# Patient Record
Sex: MALE | Race: BLACK OR AFRICAN AMERICAN | NOT HISPANIC OR LATINO | Employment: FULL TIME | URBAN - METROPOLITAN AREA
[De-identification: names, ages, dates, MRNs, and addresses within clinical notes are randomized per-mention and may not be internally consistent; named-entity substitution may affect disease eponyms.]

---

## 2017-04-25 ENCOUNTER — HISTORICAL (OUTPATIENT)
Dept: LAB | Facility: HOSPITAL | Age: 52
End: 2017-04-25

## 2018-06-01 ENCOUNTER — HISTORICAL (OUTPATIENT)
Dept: ADMINISTRATIVE | Facility: HOSPITAL | Age: 53
End: 2018-06-01

## 2018-08-24 ENCOUNTER — HISTORICAL (OUTPATIENT)
Dept: RADIOLOGY | Facility: HOSPITAL | Age: 53
End: 2018-08-24

## 2019-03-09 ENCOUNTER — HISTORICAL (OUTPATIENT)
Dept: ADMINISTRATIVE | Facility: HOSPITAL | Age: 54
End: 2019-03-09

## 2019-03-09 LAB
APPEARANCE, UA: ABNORMAL
BACTERIA #/AREA URNS AUTO: ABNORMAL /[HPF]
BILIRUB SERPL-MCNC: NORMAL MG/DL
BILIRUB UR QL STRIP: NEGATIVE
BLOOD URINE, POC: NORMAL
CLARITY, POC UA: NORMAL
COLOR UR: YELLOW
COLOR, POC UA: NORMAL
GLUCOSE (UA): NORMAL
GLUCOSE UR QL STRIP: NEGATIVE
HGB UR QL STRIP: 0.2 MG/DL
HYALINE CASTS #/AREA URNS LPF: ABNORMAL /[LPF]
KETONES UR QL STRIP: 10 MG/DL
KETONES UR QL STRIP: NORMAL
LEUKOCYTE EST, POC UA: NORMAL
LEUKOCYTE ESTERASE UR QL STRIP: 500 LEU/UL
MUCOUS THREADS URNS QL MICRO: ABNORMAL
NITRITE UR QL STRIP: ABNORMAL
NITRITE, POC UA: NEGATIVE
PH UR STRIP: 6.5 [PH] (ref 4.5–8)
PH, POC UA: 6
PROT UR QL STRIP: 100 MG/DL
PROTEIN, POC: NORMAL
RBC #/AREA URNS AUTO: ABNORMAL /[HPF]
SP GR UR STRIP: 1.02 (ref 1–1.03)
SPECIFIC GRAVITY, POC UA: 1.02
SQUAMOUS #/AREA URNS LPF: ABNORMAL /[LPF]
UROBILINOGEN UR STRIP-ACNC: 6 MG/DL
UROBILINOGEN, POC UA: NORMAL
WBC #/AREA URNS AUTO: >=100 /HPF

## 2020-03-12 ENCOUNTER — HISTORICAL (OUTPATIENT)
Dept: ADMINISTRATIVE | Facility: HOSPITAL | Age: 55
End: 2020-03-12

## 2020-03-15 LAB — FINAL CULTURE: NORMAL

## 2020-06-05 ENCOUNTER — HISTORICAL (OUTPATIENT)
Dept: ADMINISTRATIVE | Facility: HOSPITAL | Age: 55
End: 2020-06-05

## 2020-06-05 LAB
ABS NEUT (OLG): 2.49 X10(3)/MCL (ref 2.1–9.2)
ALBUMIN SERPL-MCNC: 4.2 GM/DL (ref 3.5–5)
ALBUMIN/GLOB SERPL: 1.1 RATIO (ref 1.1–2)
ALP SERPL-CCNC: 109 UNIT/L (ref 40–150)
ALT SERPL-CCNC: 35 UNIT/L (ref 0–55)
AST SERPL-CCNC: 25 UNIT/L (ref 5–34)
BASOPHILS # BLD AUTO: 0 X10(3)/MCL (ref 0–0.2)
BASOPHILS NFR BLD AUTO: 1 %
BILIRUB SERPL-MCNC: 0.5 MG/DL
BILIRUBIN DIRECT+TOT PNL SERPL-MCNC: 0.2 MG/DL (ref 0–0.5)
BILIRUBIN DIRECT+TOT PNL SERPL-MCNC: 0.3 MG/DL (ref 0–0.8)
BUN SERPL-MCNC: 11 MG/DL (ref 8.4–25.7)
CALCIUM SERPL-MCNC: 10.1 MG/DL (ref 8.4–10.2)
CHLORIDE SERPL-SCNC: 104 MMOL/L (ref 98–107)
CHOLEST SERPL-MCNC: 206 MG/DL
CHOLEST/HDLC SERPL: 5 {RATIO} (ref 0–5)
CO2 SERPL-SCNC: 22 MMOL/L (ref 22–29)
CREAT SERPL-MCNC: 0.78 MG/DL (ref 0.73–1.18)
DEPRECATED CALCIDIOL+CALCIFEROL SERPL-MC: 35.6 NG/ML (ref 6.6–49.9)
EOSINOPHIL # BLD AUTO: 0.3 X10(3)/MCL (ref 0–0.9)
EOSINOPHIL NFR BLD AUTO: 4 %
ERYTHROCYTE [DISTWIDTH] IN BLOOD BY AUTOMATED COUNT: 14 % (ref 11.5–17)
EST. AVERAGE GLUCOSE BLD GHB EST-MCNC: 180 MG/DL
GLOBULIN SER-MCNC: 3.7 GM/DL (ref 2.4–3.5)
GLUCOSE SERPL-MCNC: 141 MG/DL (ref 74–100)
HBA1C MFR BLD: 7.9 %
HCT VFR BLD AUTO: 43 % (ref 42–52)
HDLC SERPL-MCNC: 40 MG/DL (ref 35–60)
HGB BLD-MCNC: 13.3 GM/DL (ref 14–18)
LDLC SERPL CALC-MCNC: 138 MG/DL (ref 50–140)
LYMPHOCYTES # BLD AUTO: 3.6 X10(3)/MCL (ref 0.6–4.6)
LYMPHOCYTES NFR BLD AUTO: 52 %
MCH RBC QN AUTO: 27.2 PG (ref 27–31)
MCHC RBC AUTO-ENTMCNC: 30.9 GM/DL (ref 33–36)
MCV RBC AUTO: 87.9 FL (ref 80–94)
MONOCYTES # BLD AUTO: 0.5 X10(3)/MCL (ref 0.1–1.3)
MONOCYTES NFR BLD AUTO: 7 %
NEUTROPHILS # BLD AUTO: 2.49 X10(3)/MCL (ref 2.1–9.2)
NEUTROPHILS NFR BLD AUTO: 36 %
PLATELET # BLD AUTO: 365 X10(3)/MCL (ref 130–400)
PMV BLD AUTO: 10.1 FL (ref 9.4–12.4)
POTASSIUM SERPL-SCNC: 3.9 MMOL/L (ref 3.5–5.1)
PROT SERPL-MCNC: 7.9 GM/DL (ref 6.4–8.3)
PSA SERPL-MCNC: 0.57 NG/ML
RBC # BLD AUTO: 4.89 X10(6)/MCL (ref 4.7–6.1)
SODIUM SERPL-SCNC: 138 MMOL/L (ref 136–145)
TRIGL SERPL-MCNC: 139 MG/DL (ref 34–140)
TSH SERPL-ACNC: 2.87 UIU/ML (ref 0.35–4.94)
VIT B12 SERPL-MCNC: 335 PG/ML (ref 213–816)
VLDLC SERPL CALC-MCNC: 28 MG/DL
WBC # SPEC AUTO: 6.9 X10(3)/MCL (ref 4.5–11.5)

## 2020-07-14 LAB
BILIRUB SERPL-MCNC: NEGATIVE MG/DL
BLOOD URINE, POC: NEGATIVE
CLARITY, POC UA: NORMAL
COLOR, POC UA: NORMAL
GLUCOSE UR QL STRIP: NEGATIVE
KETONES UR QL STRIP: NEGATIVE
LEUKOCYTE EST, POC UA: NEGATIVE
NITRITE, POC UA: NEGATIVE
PH, POC UA: 5
PROTEIN, POC: NEGATIVE
SPECIFIC GRAVITY, POC UA: 1.02
UROBILINOGEN, POC UA: NORMAL

## 2020-12-23 ENCOUNTER — HISTORICAL (OUTPATIENT)
Dept: LAB | Facility: HOSPITAL | Age: 55
End: 2020-12-23

## 2020-12-23 LAB — SARS-COV-2 RNA RESP QL NAA+PROBE: DETECTED

## 2021-03-10 ENCOUNTER — HISTORICAL (OUTPATIENT)
Dept: ADMINISTRATIVE | Facility: HOSPITAL | Age: 56
End: 2021-03-10

## 2021-03-10 LAB
ABS NEUT (OLG): 2.05 X10(3)/MCL (ref 2.1–9.2)
BASOPHILS # BLD AUTO: 0 X10(3)/MCL (ref 0–0.2)
BASOPHILS NFR BLD AUTO: 1 %
BUN SERPL-MCNC: 9.7 MG/DL (ref 8.4–25.7)
CALCIUM SERPL-MCNC: 9.9 MG/DL (ref 8.4–10.2)
CHLORIDE SERPL-SCNC: 106 MMOL/L (ref 98–107)
CO2 SERPL-SCNC: 28 MMOL/L (ref 22–29)
CREAT SERPL-MCNC: 0.75 MG/DL (ref 0.73–1.18)
CREAT/UREA NIT SERPL: 13
EOSINOPHIL # BLD AUTO: 0.3 X10(3)/MCL (ref 0–0.9)
EOSINOPHIL NFR BLD AUTO: 5 %
ERYTHROCYTE [DISTWIDTH] IN BLOOD BY AUTOMATED COUNT: 13.7 % (ref 11.5–14.5)
EST. AVERAGE GLUCOSE BLD GHB EST-MCNC: 128.4 MG/DL
GLUCOSE SERPL-MCNC: 110 MG/DL (ref 74–100)
HBA1C MFR BLD: 6.1 %
HCT VFR BLD AUTO: 42.8 % (ref 40–51)
HGB BLD-MCNC: 13.8 GM/DL (ref 13.5–17.5)
IMM GRANULOCYTES # BLD AUTO: 0.01 10*3/UL
IMM GRANULOCYTES NFR BLD AUTO: 0 %
LYMPHOCYTES # BLD AUTO: 3.5 X10(3)/MCL (ref 0.6–4.6)
LYMPHOCYTES NFR BLD AUTO: 55 %
MCH RBC QN AUTO: 27.9 PG (ref 26–34)
MCHC RBC AUTO-ENTMCNC: 32.2 GM/DL (ref 31–37)
MCV RBC AUTO: 86.5 FL (ref 80–100)
MONOCYTES # BLD AUTO: 0.4 X10(3)/MCL (ref 0.1–1.3)
MONOCYTES NFR BLD AUTO: 6 %
NEUTROPHILS # BLD AUTO: 2.05 X10(3)/MCL (ref 2.1–9.2)
NEUTROPHILS NFR BLD AUTO: 32 %
PLATELET # BLD AUTO: 368 X10(3)/MCL (ref 130–400)
PMV BLD AUTO: 9.5 FL (ref 7.4–10.4)
POTASSIUM SERPL-SCNC: 4 MMOL/L (ref 3.5–5.1)
RBC # BLD AUTO: 4.95 X10(6)/MCL (ref 4.5–5.9)
SODIUM SERPL-SCNC: 142 MMOL/L (ref 136–145)
WBC # SPEC AUTO: 6.3 X10(3)/MCL (ref 4.5–11)

## 2021-12-14 ENCOUNTER — HISTORICAL (OUTPATIENT)
Dept: ADMINISTRATIVE | Facility: HOSPITAL | Age: 56
End: 2021-12-14

## 2021-12-14 ENCOUNTER — HISTORICAL (OUTPATIENT)
Dept: LAB | Facility: HOSPITAL | Age: 56
End: 2021-12-14

## 2021-12-14 LAB
BILIRUB SERPL-MCNC: NEGATIVE MG/DL
BLOOD URINE, POC: NEGATIVE
CLARITY, POC UA: CLEAR
COLOR, POC UA: YELLOW
GLUCOSE UR QL STRIP: NEGATIVE
KETONES UR QL STRIP: NEGATIVE
LEUKOCYTE EST, POC UA: NEGATIVE
NITRITE, POC UA: NEGATIVE
PH, POC UA: 8
PROTEIN, POC: NEGATIVE
SARS-COV-2 RNA RESP QL NAA+PROBE: NOT DETECTED
SPECIFIC GRAVITY, POC UA: 1.01
UROBILINOGEN, POC UA: NORMAL

## 2021-12-28 ENCOUNTER — HISTORICAL (OUTPATIENT)
Dept: LAB | Facility: HOSPITAL | Age: 56
End: 2021-12-28

## 2021-12-28 LAB — SARS-COV-2 RNA RESP QL NAA+PROBE: DETECTED

## 2022-04-10 ENCOUNTER — HISTORICAL (OUTPATIENT)
Dept: ADMINISTRATIVE | Facility: HOSPITAL | Age: 57
End: 2022-04-10
Payer: COMMERCIAL

## 2022-04-28 VITALS
SYSTOLIC BLOOD PRESSURE: 116 MMHG | OXYGEN SATURATION: 96 % | HEIGHT: 68 IN | WEIGHT: 293.88 LBS | DIASTOLIC BLOOD PRESSURE: 73 MMHG | BODY MASS INDEX: 44.54 KG/M2

## 2022-05-03 NOTE — HISTORICAL [facility] CERNER
This is a historical note converted from [employer]. Formatting and pictures may have been removed.  Please reference [employer] for original formatting and attached multimedia. Chief Complaint  poss UTI. dysuria, hx of kidney stone a year ago hematuria x2 days fevert xs2 days  History of Present Illness  Pt is a 53-year-old AAM, here today for burning on urination?times 2 days. ?Patient states?the burning?is at the end of urination. ?Denies abdominal pain, flank pain, fever, chills, body aches. ?Patient states that he had kidney stones?before?and states that this pain is not the same pain as when he had the kidney stones that required lithotripsy.? Patient states?that he was seeing a urologist, Dr. Syed Diaz.  Review of Systems  Constitutional: negative except as stated in HPI  Eye: negative except as stated in HPI  ENT: negative except as stated in HPI  Respiratory: negative except as stated in HPI  Cardiovascular: negative except as stated in HPI  Gastrointestinal: negative except as stated in HPI  Genitourinary: negative except as stated in HPI  Hema/Lymph: negative except as stated in HPI  Endocrine: negative except as stated in HPI  Immunologic: negative except as stated in HPI  Musculoskeletal: negative except as stated in HPI  Integumentary: negative except as stated in HPI  Neurologic: negative except as stated in HPI  All Other ROS_ negative except as stated in HPI  ?  ?  Physical Exam  Vitals & Measurements  T:?37.3? ?C (Oral)? HR:?91(Peripheral)? RR:?18? BP:?138/78? SpO2:?99%?  HT:?172?cm? WT:?124.4?kg? BMI:?42.05?  General: Alert and oriented, No acute distress.  Eye:? Extraocular movements are intact.  HENT: Normocephalic.  Respiratory: Lungs are clear to auscultation, Respirations are non-labored, Breath sounds are equal, Symmetrical chest wall expansion.  Cardiovascular: Normal rate, Regular rhythm, No murmur.  Gastrointestinal: Soft, Non-tender, Non-distended, Normal bowel sounds.  Genitourinary: No  flank tenderness.  Musculoskeletal: Normal range of motion.  Integumentary: Warm, Dry, Intact.  Neurologic: No focal deficits, Cranial Nerves II-XII are grossly intact.  ?  Assessment/Plan  1.?Acute UTI?N39.0,?Acute UTI?N39.0  ? Medication as ordered. Maintain adequate hydration. Wipe front to back.? Urine sent for C&S. ?ER precautions  Urinalysis????????  Color Ur Dipstick????????Hannah  Appearance Ur Dipstick????????Cloudy  pH Ur Dipstick????????6  Specific Gravity Ur Dipstick????????1.025  Blood Ur Dipstick????????Large  Glucose Ur Dipstick????????Negative  Ketones Ur Dipstick????????3+  Protein Ur Dipstick????????2+ (100 mg/dl)  Bilirubin Ur Dipstick????????Small  Urobilinogen Ur Dipstick????????8 mg/dl  Leukocytes Ur Dipstick????????Large  Nitrite Ur Dipstick????????Negative  ?  Ordered:  ciprofloxacin, 500 mg = 1 tab(s), Oral, q12hr, X 7 day(s), # 14 tab(s), 0 Refill(s), Pharmacy: [id number]  After Hrs Visit [id number] PC, Acute UTI, 03/09/19 13:31:00 CST  Office/Outpatient Visit Level 3 Established [id number] PC, Acute UTI, 03/09/19 13:31:00 CST  Urinalysis with Microscopic if Indicated, Stat collect, Urine, 03/09/19 13:17:00 CST, Stop date 03/09/19 13:17:00 CST, Nurse collect, Acute UTI  Urine Culture [id number], Stat collect, 03/09/19 13:17:00 CST, Urine, Nurse collect, Stop date 03/09/19 13:17:00 CST, Acute UTI  ?  2.?Dysuria?R30.0  ?POC same as above.  ?   Problem List/Past Medical History  Ongoing  Headache  Morbid obesity  Thunderclap headache  Historical  No qualifying data  Procedure/Surgical History  Lithotripsy  Rotator cuff repair   Medications  Cipro 500 mg oral tablet, 500 mg= 1 tab(s), Oral, q12hr  diclofenac sodium 75 mg oral delayed release tablet, 75 mg= 1 tab(s), Oral, BID, PRN,? ?Not taking  Excedrin Migraine, Oral, q6hr  gabapentin 300 mg oral capsule, 300 mg= 1 cap(s), Oral, Once a day (at bedtime), 3 refills,? ?Not taking  Allergies  No Known Allergies  Social History  Alcohol  Never,  03/09/2019  Substance Abuse  Never, 03/09/2019  Never, 06/15/2018  Tobacco  Never (less than 100 in lifetime), N/A, 03/09/2019  Never smoker, 03/25/2018  Family History  Cancer: Mother.  Colon: Mother.  Hyperlipidaemia: Father.  Health Maintenance  Health Maintenance  ???Pending?(in the next year)  ??? ??OverDue  ??? ? ? ?Diabetes Screening due??and every?  ??? ??Due?  ??? ? ? ?ADL Screening due??03/09/19??and every 1??year(s)  ??? ? ? ?Alcohol Misuse Screening due??03/09/19??and every 1??year(s)  ??? ? ? ?Aspirin Therapy for CVD Prevention due??03/09/19??and every 1??year(s)  ??? ? ? ?Colorectal Screening due??03/09/19??and every?  ??? ? ? ?Lipid Screening due??03/09/19??and every?  ??? ? ? ?TB Skin Test due??03/09/19??Variable frequency  ??? ? ? ?Tetanus Vaccine due??03/09/19??and every 10??year(s)  ??? ??Due In Future?  ??? ? ? ?Blood Pressure Screening not due until??03/08/20??and every 1??year(s)  ??? ? ? ?Body Mass Index Check not due until??03/08/20??and every 1??year(s)  ??? ? ? ?Depression Screening not due until??03/08/20??and every 1??year(s)  ???Satisfied?(in the past 1 year)  ??? ??Satisfied?  ??? ? ? ?Blood Pressure Screening on??03/09/19.??Satisfied by Lorin Jo LPN  ??? ? ? ?Body Mass Index Check on??03/09/19.??Satisfied by Lorin Jo LPN  ??? ? ? ?Depression Screening on??03/09/19.??Satisfied by Lorin Jo LPN  ??? ? ? ?Diabetes Screening on??03/25/18.??Satisfied by Shavonne Daniels  ??? ? ? ?Obesity Screening on??03/09/19.??Satisfied by Lorin Jo LPN  ?  ?  Diagnostic Results  Urinalysis????????  Color Ur Dipstick????????Hannah  Appearance Ur Dipstick????????Cloudy  pH Ur Dipstick????????6  Specific Gravity Ur Dipstick????????1.025  Blood Ur Dipstick????????Large  Glucose Ur Dipstick????????Negative  Ketones Ur Dipstick????????3+  Protein Ur Dipstick????????2+ (100 mg/dl)  Bilirubin Ur Dipstick????????Small  Urobilinogen Ur Dipstick????????8 mg/dl  Leukocytes Ur  Dipstick????????Large  Nitrite Ur Dipstick????????Negative  ?

## 2022-06-22 ENCOUNTER — OFFICE VISIT (OUTPATIENT)
Dept: FAMILY MEDICINE | Facility: CLINIC | Age: 57
End: 2022-06-22
Payer: COMMERCIAL

## 2022-06-22 ENCOUNTER — HOSPITAL ENCOUNTER (OUTPATIENT)
Facility: HOSPITAL | Age: 57
Discharge: HOME | End: 2022-06-24
Attending: EMERGENCY MEDICINE | Admitting: INTERNAL MEDICINE
Payer: COMMERCIAL

## 2022-06-22 VITALS
RESPIRATION RATE: 18 BRPM | TEMPERATURE: 99 F | HEART RATE: 74 BPM | OXYGEN SATURATION: 98 % | BODY MASS INDEX: 43.47 KG/M2 | DIASTOLIC BLOOD PRESSURE: 83 MMHG | WEIGHT: 286.81 LBS | HEIGHT: 68 IN | SYSTOLIC BLOOD PRESSURE: 121 MMHG

## 2022-06-22 DIAGNOSIS — N17.9 AKI (ACUTE KIDNEY INJURY): Primary | ICD-10-CM

## 2022-06-22 DIAGNOSIS — E86.0 DEHYDRATION: ICD-10-CM

## 2022-06-22 DIAGNOSIS — R07.9 CHEST PAIN, UNSPECIFIED TYPE: Primary | ICD-10-CM

## 2022-06-22 DIAGNOSIS — R07.9 CHEST PAIN: ICD-10-CM

## 2022-06-22 DIAGNOSIS — R79.89 LOW TESTOSTERONE: ICD-10-CM

## 2022-06-22 DIAGNOSIS — R35.1 NOCTURIA: ICD-10-CM

## 2022-06-22 DIAGNOSIS — E11.9 NEW ONSET TYPE 2 DIABETES MELLITUS: ICD-10-CM

## 2022-06-22 DIAGNOSIS — R07.89 CHEST TIGHTNESS: ICD-10-CM

## 2022-06-22 DIAGNOSIS — R42 DIZZINESS: ICD-10-CM

## 2022-06-22 DIAGNOSIS — R73.9: ICD-10-CM

## 2022-06-22 DIAGNOSIS — R53.83 FATIGUE, UNSPECIFIED TYPE: ICD-10-CM

## 2022-06-22 DIAGNOSIS — R73.02 IGT (IMPAIRED GLUCOSE TOLERANCE): ICD-10-CM

## 2022-06-22 DIAGNOSIS — I20.89 ANGINA AT REST: ICD-10-CM

## 2022-06-22 PROBLEM — J45.909 ASTHMA: Status: ACTIVE | Noted: 2022-06-22

## 2022-06-22 LAB
ALBUMIN SERPL-MCNC: 3.7 GM/DL (ref 3.5–5)
ALBUMIN/GLOB SERPL: 1.2 RATIO (ref 1.1–2)
ALP SERPL-CCNC: 144 UNIT/L (ref 40–150)
ALT SERPL-CCNC: 27 UNIT/L (ref 0–55)
AST SERPL-CCNC: 18 UNIT/L (ref 5–34)
BASOPHILS # BLD AUTO: 0.05 X10(3)/MCL (ref 0–0.2)
BASOPHILS NFR BLD AUTO: 0.8 %
BILIRUBIN DIRECT+TOT PNL SERPL-MCNC: 0.4 MG/DL
BUN SERPL-MCNC: 11 MG/DL (ref 8.4–25.7)
CALCIUM SERPL-MCNC: 9.6 MG/DL (ref 8.4–10.2)
CHLORIDE SERPL-SCNC: 103 MMOL/L (ref 98–107)
CO2 SERPL-SCNC: 24 MMOL/L (ref 22–29)
CREAT SERPL-MCNC: 1.2 MG/DL (ref 0.73–1.18)
EOSINOPHIL # BLD AUTO: 0.24 X10(3)/MCL (ref 0–0.9)
EOSINOPHIL NFR BLD AUTO: 3.6 %
ERYTHROCYTE [DISTWIDTH] IN BLOOD BY AUTOMATED COUNT: 13.2 % (ref 11.5–17)
GLOBULIN SER-MCNC: 3 GM/DL (ref 2.4–3.5)
GLUCOSE SERPL-MCNC: 336 MG/DL (ref 70–110)
GLUCOSE SERPL-MCNC: 544 MG/DL (ref 74–100)
HCT VFR BLD AUTO: 43.6 % (ref 42–52)
HGB BLD-MCNC: 14.1 GM/DL (ref 14–18)
IMM GRANULOCYTES # BLD AUTO: 0.01 X10(3)/MCL (ref 0–0.02)
IMM GRANULOCYTES NFR BLD AUTO: 0.2 % (ref 0–0.43)
LYMPHOCYTES # BLD AUTO: 3.88 X10(3)/MCL (ref 0.6–4.6)
LYMPHOCYTES NFR BLD AUTO: 58.9 %
MCH RBC QN AUTO: 27.1 PG (ref 27–31)
MCHC RBC AUTO-ENTMCNC: 32.3 MG/DL (ref 33–36)
MCV RBC AUTO: 83.8 FL (ref 80–94)
MONOCYTES # BLD AUTO: 0.4 X10(3)/MCL (ref 0.1–1.3)
MONOCYTES NFR BLD AUTO: 6.1 %
NEUTROPHILS # BLD AUTO: 2 X10(3)/MCL (ref 2.1–9.2)
NEUTROPHILS NFR BLD AUTO: 30.4 %
NRBC BLD AUTO-RTO: 0 %
PLATELET # BLD AUTO: 365 X10(3)/MCL (ref 130–400)
PMV BLD AUTO: 10.5 FL (ref 9.4–12.4)
POCT GLUCOSE: >500 MG/DL (ref 70–110)
POTASSIUM SERPL-SCNC: 4.5 MMOL/L (ref 3.5–5.1)
PROT SERPL-MCNC: 6.7 GM/DL (ref 6.4–8.3)
RBC # BLD AUTO: 5.2 X10(6)/MCL (ref 4.7–6.1)
SODIUM SERPL-SCNC: 135 MMOL/L (ref 136–145)
TROPONIN I SERPL-MCNC: <0.01 NG/ML (ref 0–0.04)
WBC # SPEC AUTO: 6.6 X10(3)/MCL (ref 4.5–11.5)

## 2022-06-22 PROCEDURE — 99214 OFFICE O/P EST MOD 30 MIN: CPT | Mod: ,,, | Performed by: NURSE PRACTITIONER

## 2022-06-22 PROCEDURE — 3008F BODY MASS INDEX DOCD: CPT | Mod: CPTII,,, | Performed by: NURSE PRACTITIONER

## 2022-06-22 PROCEDURE — 3074F PR MOST RECENT SYSTOLIC BLOOD PRESSURE < 130 MM HG: ICD-10-PCS | Mod: CPTII,,, | Performed by: NURSE PRACTITIONER

## 2022-06-22 PROCEDURE — 99285 EMERGENCY DEPT VISIT HI MDM: CPT | Mod: 25

## 2022-06-22 PROCEDURE — 25000003 PHARM REV CODE 250: Performed by: EMERGENCY MEDICINE

## 2022-06-22 PROCEDURE — 96360 HYDRATION IV INFUSION INIT: CPT

## 2022-06-22 PROCEDURE — 84484 ASSAY OF TROPONIN QUANT: CPT | Performed by: PHYSICIAN ASSISTANT

## 2022-06-22 PROCEDURE — 96361 HYDRATE IV INFUSION ADD-ON: CPT

## 2022-06-22 PROCEDURE — 1160F RVW MEDS BY RX/DR IN RCRD: CPT | Mod: CPTII,,, | Performed by: NURSE PRACTITIONER

## 2022-06-22 PROCEDURE — 80053 COMPREHEN METABOLIC PANEL: CPT | Performed by: PHYSICIAN ASSISTANT

## 2022-06-22 PROCEDURE — 85027 COMPLETE CBC AUTOMATED: CPT | Performed by: EMERGENCY MEDICINE

## 2022-06-22 PROCEDURE — 36415 COLL VENOUS BLD VENIPUNCTURE: CPT | Performed by: PHYSICIAN ASSISTANT

## 2022-06-22 PROCEDURE — G0378 HOSPITAL OBSERVATION PER HR: HCPCS

## 2022-06-22 PROCEDURE — 93005 ELECTROCARDIOGRAM TRACING: CPT

## 2022-06-22 PROCEDURE — 82962 GLUCOSE BLOOD TEST: CPT

## 2022-06-22 PROCEDURE — 3074F SYST BP LT 130 MM HG: CPT | Mod: CPTII,,, | Performed by: NURSE PRACTITIONER

## 2022-06-22 PROCEDURE — 80061 LIPID PANEL: CPT | Performed by: INTERNAL MEDICINE

## 2022-06-22 PROCEDURE — 99214 PR OFFICE/OUTPT VISIT, EST, LEVL IV, 30-39 MIN: ICD-10-PCS | Mod: ,,, | Performed by: NURSE PRACTITIONER

## 2022-06-22 PROCEDURE — 25000003 PHARM REV CODE 250: Performed by: PHYSICIAN ASSISTANT

## 2022-06-22 PROCEDURE — 1159F MED LIST DOCD IN RCRD: CPT | Mod: CPTII,,, | Performed by: NURSE PRACTITIONER

## 2022-06-22 PROCEDURE — 1160F PR REVIEW ALL MEDS BY PRESCRIBER/CLIN PHARMACIST DOCUMENTED: ICD-10-PCS | Mod: CPTII,,, | Performed by: NURSE PRACTITIONER

## 2022-06-22 PROCEDURE — 3079F PR MOST RECENT DIASTOLIC BLOOD PRESSURE 80-89 MM HG: ICD-10-PCS | Mod: CPTII,,, | Performed by: NURSE PRACTITIONER

## 2022-06-22 PROCEDURE — 3079F DIAST BP 80-89 MM HG: CPT | Mod: CPTII,,, | Performed by: NURSE PRACTITIONER

## 2022-06-22 PROCEDURE — 1159F PR MEDICATION LIST DOCUMENTED IN MEDICAL RECORD: ICD-10-PCS | Mod: CPTII,,, | Performed by: NURSE PRACTITIONER

## 2022-06-22 PROCEDURE — 3008F PR BODY MASS INDEX (BMI) DOCUMENTED: ICD-10-PCS | Mod: CPTII,,, | Performed by: NURSE PRACTITIONER

## 2022-06-22 RX ORDER — RIMEGEPANT SULFATE 75 MG/75MG
75 TABLET, ORALLY DISINTEGRATING ORAL DAILY PRN
COMMUNITY
Start: 2022-01-14 | End: 2023-10-11 | Stop reason: SDUPTHER

## 2022-06-22 RX ORDER — GLUCAGON 1 MG
1 KIT INJECTION
Status: DISCONTINUED | OUTPATIENT
Start: 2022-06-22 | End: 2022-06-24 | Stop reason: HOSPADM

## 2022-06-22 RX ORDER — ASPIRIN 325 MG
325 TABLET, DELAYED RELEASE (ENTERIC COATED) ORAL
Status: COMPLETED | OUTPATIENT
Start: 2022-06-22 | End: 2022-06-22

## 2022-06-22 RX ORDER — IBUPROFEN 200 MG
24 TABLET ORAL
Status: DISCONTINUED | OUTPATIENT
Start: 2022-06-22 | End: 2022-06-24 | Stop reason: HOSPADM

## 2022-06-22 RX ORDER — ALBUTEROL SULFATE 90 UG/1
90 AEROSOL, METERED RESPIRATORY (INHALATION) DAILY PRN
Status: ON HOLD | COMMUNITY
Start: 2022-02-03 | End: 2022-08-01 | Stop reason: SDUPTHER

## 2022-06-22 RX ORDER — INSULIN ASPART 100 [IU]/ML
0-5 INJECTION, SOLUTION INTRAVENOUS; SUBCUTANEOUS
Status: DISCONTINUED | OUTPATIENT
Start: 2022-06-22 | End: 2022-06-23

## 2022-06-22 RX ORDER — IBUPROFEN 200 MG
16 TABLET ORAL
Status: DISCONTINUED | OUTPATIENT
Start: 2022-06-22 | End: 2022-06-24 | Stop reason: HOSPADM

## 2022-06-22 RX ADMIN — SODIUM CHLORIDE 1000 ML: 9 INJECTION, SOLUTION INTRAVENOUS at 06:06

## 2022-06-22 RX ADMIN — ASPIRIN 325 MG: 325 TABLET, COATED ORAL at 08:06

## 2022-06-22 RX ADMIN — SODIUM CHLORIDE 1000 ML: 9 INJECTION, SOLUTION INTRAVENOUS at 08:06

## 2022-06-22 NOTE — ED TRIAGE NOTES
Pt states that he has been having polyuria, thirst, fatigue, chest heaviness. States that he was told by his pcp to come to ED for dx of new onset diabetes. CBG over 500 in triage

## 2022-06-22 NOTE — PROGRESS NOTES
Subjective:      Patient ID: Chadwick Lorenzo is a 56 y.o. Black or  male      Chief Complaint: Establish Care (New pt, prostate, possible diabetes, weakness)       Past Medical History:   Diagnosis Date    IGT (impaired glucose tolerance)     Kidney stones     Low testosterone in male     Migraine     Morbid obesity     Vitamin D deficiency         HPI  Presents to clinic for evaluation.  Previous patient of Dr. Sheets.  Wishes to establish care with an [facility] provider.  C/O of nocturia x 1 week.  States he awakens about 5-6 times per night. Associated with fatigue and increased thirst.  Denies any urgency, hematuria, dysuria, fever, chills, or body aches.  Of note,  A1C (7.9) 6/5/2020; repeat A1C (6.1) 3/10/2021.  Pt has never been treated for Diabetes.    C/O of dizziness x 1 week.  Associated with palpitations and chest heaviness.  Denies any syncope, one-sided weakness, slurred speech, peripheral edema, Orthopnea or SOB.  He has never been evaluated per Cardiology.    Low Testosterone:  Hx of low testosterone.  Previously taking AndroGel topical.  Not currently taking any medication.        Review of Systems   Constitutional: Positive for fatigue. Negative for chills, fever and unexpected weight change.   HENT: Negative.    Eyes: Negative.    Respiratory: Positive for chest tightness (chest heaviness) and wheezing. Negative for shortness of breath.    Cardiovascular: Positive for chest pain.   Gastrointestinal: Negative.    Endocrine: Negative.    Genitourinary: Positive for frequency (nocturia), hematuria and urgency. Negative for difficulty urinating and dysuria.   Musculoskeletal: Negative.    Integumentary:  Negative.   Allergic/Immunologic: Negative.    Neurological: Positive for dizziness and light-headedness. Negative for weakness.   Hematological: Negative.    Psychiatric/Behavioral: Negative.    All other systems reviewed and are negative.         Objective:      Vitals:     06/22/22 1358   BP: 121/83   Pulse: 74   Resp: 18   Temp: 98.8 °F (37.1 °C)      Body mass index is 43.61 kg/m².     Physical Exam       Current Outpatient Medications:     albuterol (PROVENTIL/VENTOLIN HFA) 90 mcg/actuation inhaler, Inhale 90 puffs into the lungs daily as needed., Disp: , Rfl:     NURTEC 75 mg odt, Take 75 mg by mouth daily as needed., Disp: , Rfl:     Assessment & Plan:     Problem List Items Addressed This Visit        Cardiac/Vascular    Chest pain - Primary     Refer to Cardiology for evaluation  Strict ED precautions discussed  Instructed to report to ED for any CP, syncope, near-syncope, SOB, and/or worsening symptoms  Patient is agreeable to plan and verbalizes understanding           Relevant Orders    Ambulatory referral/consult to Cardiology       Renal/    Nocturia     U/A, Urine Culture, and PSA today  Will call with results  Urology can be considered if indicated pending labs           Relevant Orders    Urinalysis, Reflex to Urine Culture Urine, Clean Catch    PSA, Screening       Endocrine    IGT (impaired glucose tolerance)     Labs today  Will initiate medications pending labs           Relevant Orders    Hemoglobin A1C (Completed)    Low testosterone     Previously taking AndroGel  Will repeat labs (early morning) and consider resuming pending labs           Relevant Orders    Testosterone       Other    Dizziness     Dizziness, associated with palpitations and CP  Refer to Cardiology for evaluation  Strict ED precautions discussed  Instructed to report to ED for any CP, syncope, near-syncope, SOB, an/or worsening symptoms  Patient is agreeable to plan and verbalizes understanding             Relevant Orders    CBC Auto Differential    Comprehensive Metabolic Panel    TSH    T4, Free    Ambulatory referral/consult to Cardiology    Fatigue     Labs today  Will call with results           Relevant Orders    CBC Auto Differential    Comprehensive Metabolic Panel    TSH    T4,  Free

## 2022-06-22 NOTE — ASSESSMENT & PLAN NOTE
Dizziness, associated with palpitations and CP  Refer to Cardiology for evaluation  Strict ED precautions discussed  Instructed to report to ED for any CP, syncope, near-syncope, SOB, an/or worsening symptoms  Patient is agreeable to plan and verbalizes understanding

## 2022-06-22 NOTE — ASSESSMENT & PLAN NOTE
U/A, Urine Culture, and PSA today  Will call with results  Urology can be considered if indicated pending labs

## 2022-06-22 NOTE — FIRST PROVIDER EVALUATION
"Medical screening exam completed.  I have conducted a focused provider triage encounter, findings are as follows:    Brief history of present illness:  56 year male presents to ED for evaluation increased thirst fatigue over the days.  Patient reports 2 chest heaviness.  Seen his PCP earlier today where diagnosed with new onset diabetes and told to come to ED.  Vitals:    06/22/22 1750   BP: 130/79   BP Location: Left arm   Patient Position: Sitting   Pulse: 96   Resp: 18   Temp: 98.2 °F (36.8 °C)   TempSrc: Temporal   SpO2: 98%   Weight: 129.7 kg (286 lb)   Height: 5' 8" (1.727 m)       Pertinent physical exam:  Awake, alert and oriented.     Brief workup plan:  CMP, IVF    Preliminary workup initiated; this workup will be continued and followed by the physician or advanced practice provider that is assigned to the patient when roomed.  "

## 2022-06-22 NOTE — ASSESSMENT & PLAN NOTE
Refer to Cardiology for evaluation  Strict ED precautions discussed  Instructed to report to ED for any CP, syncope, near-syncope, SOB, and/or worsening symptoms  Patient is agreeable to plan and verbalizes understanding

## 2022-06-23 DIAGNOSIS — E11.65 TYPE 2 DIABETES MELLITUS WITH HYPERGLYCEMIA, UNSPECIFIED WHETHER LONG TERM INSULIN USE: Primary | ICD-10-CM

## 2022-06-23 PROBLEM — R73.9: Status: ACTIVE | Noted: 2022-06-23

## 2022-06-23 PROBLEM — E11.9 NEW ONSET TYPE 2 DIABETES MELLITUS: Status: ACTIVE | Noted: 2022-06-23

## 2022-06-23 LAB
ANION GAP SERPL CALC-SCNC: 6 MEQ/L
BASOPHILS # BLD AUTO: 0.02 X10(3)/MCL (ref 0–0.2)
BASOPHILS NFR BLD AUTO: 0.4 %
BUN SERPL-MCNC: 8.4 MG/DL (ref 8.4–25.7)
CALCIUM SERPL-MCNC: 9.2 MG/DL (ref 8.4–10.2)
CHLORIDE SERPL-SCNC: 108 MMOL/L (ref 98–107)
CHOLEST SERPL-MCNC: 231 MG/DL
CHOLEST/HDLC SERPL: 7 {RATIO} (ref 0–5)
CO2 SERPL-SCNC: 23 MMOL/L (ref 22–29)
CREAT SERPL-MCNC: 0.87 MG/DL (ref 0.73–1.18)
CREAT/UREA NIT SERPL: 10
EOSINOPHIL # BLD AUTO: 0.18 X10(3)/MCL (ref 0–0.9)
EOSINOPHIL NFR BLD AUTO: 3.2 %
ERYTHROCYTE [DISTWIDTH] IN BLOOD BY AUTOMATED COUNT: 13.3 % (ref 11.5–17)
GLUCOSE SERPL-MCNC: 358 MG/DL (ref 74–100)
HCT VFR BLD AUTO: 36.9 % (ref 42–52)
HDLC SERPL-MCNC: 33 MG/DL (ref 35–60)
HGB BLD-MCNC: 11.7 GM/DL (ref 14–18)
IMM GRANULOCYTES # BLD AUTO: 0.01 X10(3)/MCL (ref 0–0.02)
IMM GRANULOCYTES NFR BLD AUTO: 0.2 % (ref 0–0.43)
LDLC SERPL CALC-MCNC: 137 MG/DL (ref 50–140)
LYMPHOCYTES # BLD AUTO: 3.07 X10(3)/MCL (ref 0.6–4.6)
LYMPHOCYTES NFR BLD AUTO: 55.3 %
MAGNESIUM SERPL-MCNC: 1.9 MG/DL (ref 1.6–2.6)
MCH RBC QN AUTO: 27.3 PG (ref 27–31)
MCHC RBC AUTO-ENTMCNC: 31.7 MG/DL (ref 33–36)
MCV RBC AUTO: 86.2 FL (ref 80–94)
MONOCYTES # BLD AUTO: 0.44 X10(3)/MCL (ref 0.1–1.3)
MONOCYTES NFR BLD AUTO: 7.9 %
NEUTROPHILS # BLD AUTO: 1.8 X10(3)/MCL (ref 2.1–9.2)
NEUTROPHILS NFR BLD AUTO: 33 %
NRBC BLD AUTO-RTO: 0 %
PHOSPHATE SERPL-MCNC: 2.7 MG/DL (ref 2.3–4.7)
PLATELET # BLD AUTO: 290 X10(3)/MCL (ref 130–400)
PMV BLD AUTO: 10.2 FL (ref 9.4–12.4)
POCT GLUCOSE: 207 MG/DL (ref 70–110)
POCT GLUCOSE: 220 MG/DL (ref 70–110)
POCT GLUCOSE: 256 MG/DL (ref 70–110)
POCT GLUCOSE: 336 MG/DL (ref 70–110)
POTASSIUM SERPL-SCNC: 4.5 MMOL/L (ref 3.5–5.1)
RBC # BLD AUTO: 4.28 X10(6)/MCL (ref 4.7–6.1)
SODIUM SERPL-SCNC: 137 MMOL/L (ref 136–145)
TRIGL SERPL-MCNC: 304 MG/DL (ref 34–140)
TROPONIN I SERPL-MCNC: <0.01 NG/ML (ref 0–0.04)
VLDLC SERPL CALC-MCNC: 61 MG/DL
WBC # SPEC AUTO: 5.6 X10(3)/MCL (ref 4.5–11.5)

## 2022-06-23 PROCEDURE — C9399 UNCLASSIFIED DRUGS OR BIOLOG: HCPCS | Performed by: INTERNAL MEDICINE

## 2022-06-23 PROCEDURE — 96372 THER/PROPH/DIAG INJ SC/IM: CPT | Performed by: INTERNAL MEDICINE

## 2022-06-23 PROCEDURE — 63600175 PHARM REV CODE 636 W HCPCS: Performed by: INTERNAL MEDICINE

## 2022-06-23 PROCEDURE — 25000003 PHARM REV CODE 250: Performed by: INTERNAL MEDICINE

## 2022-06-23 PROCEDURE — G0378 HOSPITAL OBSERVATION PER HR: HCPCS

## 2022-06-23 PROCEDURE — 93005 ELECTROCARDIOGRAM TRACING: CPT

## 2022-06-23 PROCEDURE — 96361 HYDRATE IV INFUSION ADD-ON: CPT

## 2022-06-23 PROCEDURE — 84100 ASSAY OF PHOSPHORUS: CPT | Performed by: INTERNAL MEDICINE

## 2022-06-23 PROCEDURE — 84156 ASSAY OF PROTEIN URINE: CPT | Performed by: INTERNAL MEDICINE

## 2022-06-23 PROCEDURE — 36415 COLL VENOUS BLD VENIPUNCTURE: CPT | Performed by: INTERNAL MEDICINE

## 2022-06-23 PROCEDURE — 80048 BASIC METABOLIC PNL TOTAL CA: CPT | Performed by: INTERNAL MEDICINE

## 2022-06-23 PROCEDURE — 84484 ASSAY OF TROPONIN QUANT: CPT | Performed by: INTERNAL MEDICINE

## 2022-06-23 PROCEDURE — 83735 ASSAY OF MAGNESIUM: CPT | Performed by: INTERNAL MEDICINE

## 2022-06-23 PROCEDURE — 85025 COMPLETE CBC W/AUTO DIFF WBC: CPT | Performed by: INTERNAL MEDICINE

## 2022-06-23 PROCEDURE — 82962 GLUCOSE BLOOD TEST: CPT

## 2022-06-23 RX ORDER — MAG HYDROX/ALUMINUM HYD/SIMETH 200-200-20
30 SUSPENSION, ORAL (FINAL DOSE FORM) ORAL 4 TIMES DAILY PRN
Status: DISCONTINUED | OUTPATIENT
Start: 2022-06-23 | End: 2022-06-24 | Stop reason: HOSPADM

## 2022-06-23 RX ORDER — NITROGLYCERIN 0.4 MG/1
0.4 TABLET SUBLINGUAL EVERY 5 MIN PRN
Status: DISCONTINUED | OUTPATIENT
Start: 2022-06-23 | End: 2022-06-24 | Stop reason: HOSPADM

## 2022-06-23 RX ORDER — ATORVASTATIN CALCIUM 40 MG/1
40 TABLET, FILM COATED ORAL DAILY
Status: DISCONTINUED | OUTPATIENT
Start: 2022-06-23 | End: 2022-06-24 | Stop reason: HOSPADM

## 2022-06-23 RX ORDER — ENOXAPARIN SODIUM 100 MG/ML
40 INJECTION SUBCUTANEOUS EVERY 24 HOURS
Status: DISCONTINUED | OUTPATIENT
Start: 2022-06-23 | End: 2022-06-24 | Stop reason: HOSPADM

## 2022-06-23 RX ORDER — SODIUM CHLORIDE 0.9 % (FLUSH) 0.9 %
10 SYRINGE (ML) INJECTION
Status: DISCONTINUED | OUTPATIENT
Start: 2022-06-23 | End: 2022-06-24 | Stop reason: HOSPADM

## 2022-06-23 RX ORDER — ACETAMINOPHEN 500 MG
1000 TABLET ORAL EVERY 6 HOURS PRN
Status: DISCONTINUED | OUTPATIENT
Start: 2022-06-23 | End: 2022-06-24 | Stop reason: HOSPADM

## 2022-06-23 RX ORDER — NAPROXEN SODIUM 220 MG/1
81 TABLET, FILM COATED ORAL DAILY
Status: DISCONTINUED | OUTPATIENT
Start: 2022-06-23 | End: 2022-06-24 | Stop reason: HOSPADM

## 2022-06-23 RX ORDER — ACETAMINOPHEN 325 MG/1
650 TABLET ORAL EVERY 4 HOURS PRN
Status: DISCONTINUED | OUTPATIENT
Start: 2022-06-23 | End: 2022-06-24 | Stop reason: HOSPADM

## 2022-06-23 RX ORDER — SIMETHICONE 80 MG
1 TABLET,CHEWABLE ORAL 4 TIMES DAILY PRN
Status: DISCONTINUED | OUTPATIENT
Start: 2022-06-23 | End: 2022-06-24 | Stop reason: HOSPADM

## 2022-06-23 RX ORDER — PROCHLORPERAZINE EDISYLATE 5 MG/ML
5 INJECTION INTRAMUSCULAR; INTRAVENOUS EVERY 6 HOURS PRN
Status: DISCONTINUED | OUTPATIENT
Start: 2022-06-23 | End: 2022-06-24 | Stop reason: HOSPADM

## 2022-06-23 RX ORDER — INSULIN ASPART 100 [IU]/ML
1-10 INJECTION, SOLUTION INTRAVENOUS; SUBCUTANEOUS EVERY 4 HOURS PRN
Status: DISCONTINUED | OUTPATIENT
Start: 2022-06-23 | End: 2022-06-24 | Stop reason: HOSPADM

## 2022-06-23 RX ORDER — INSULIN ASPART 100 [IU]/ML
6 INJECTION, SOLUTION INTRAVENOUS; SUBCUTANEOUS
Status: DISCONTINUED | OUTPATIENT
Start: 2022-06-23 | End: 2022-06-24 | Stop reason: HOSPADM

## 2022-06-23 RX ORDER — AMOXICILLIN 250 MG
1 CAPSULE ORAL 2 TIMES DAILY PRN
Status: DISCONTINUED | OUTPATIENT
Start: 2022-06-23 | End: 2022-06-24 | Stop reason: HOSPADM

## 2022-06-23 RX ORDER — TALC
6 POWDER (GRAM) TOPICAL NIGHTLY PRN
Status: DISCONTINUED | OUTPATIENT
Start: 2022-06-23 | End: 2022-06-24 | Stop reason: HOSPADM

## 2022-06-23 RX ORDER — SODIUM CHLORIDE, SODIUM LACTATE, POTASSIUM CHLORIDE, CALCIUM CHLORIDE 600; 310; 30; 20 MG/100ML; MG/100ML; MG/100ML; MG/100ML
INJECTION, SOLUTION INTRAVENOUS CONTINUOUS
Status: DISCONTINUED | OUTPATIENT
Start: 2022-06-23 | End: 2022-06-23

## 2022-06-23 RX ORDER — POLYETHYLENE GLYCOL 3350 17 G/17G
17 POWDER, FOR SOLUTION ORAL 2 TIMES DAILY PRN
Status: DISCONTINUED | OUTPATIENT
Start: 2022-06-23 | End: 2022-06-24 | Stop reason: HOSPADM

## 2022-06-23 RX ORDER — ONDANSETRON 2 MG/ML
4 INJECTION INTRAMUSCULAR; INTRAVENOUS EVERY 4 HOURS PRN
Status: DISCONTINUED | OUTPATIENT
Start: 2022-06-23 | End: 2022-06-24 | Stop reason: HOSPADM

## 2022-06-23 RX ADMIN — INSULIN DETEMIR 20 UNITS: 100 INJECTION, SOLUTION SUBCUTANEOUS at 09:06

## 2022-06-23 RX ADMIN — ENOXAPARIN SODIUM 40 MG: 100 INJECTION SUBCUTANEOUS at 05:06

## 2022-06-23 RX ADMIN — Medication 81 MG: at 08:06

## 2022-06-23 RX ADMIN — INSULIN DETEMIR 15 UNITS: 100 INJECTION, SOLUTION SUBCUTANEOUS at 12:06

## 2022-06-23 RX ADMIN — MELATONIN TAB 3 MG 6 MG: 3 TAB at 12:06

## 2022-06-23 RX ADMIN — ACETAMINOPHEN 1000 MG: 500 TABLET ORAL at 06:06

## 2022-06-23 RX ADMIN — ATORVASTATIN CALCIUM 40 MG: 40 TABLET, FILM COATED ORAL at 08:06

## 2022-06-23 RX ADMIN — SODIUM CHLORIDE, POTASSIUM CHLORIDE, SODIUM LACTATE AND CALCIUM CHLORIDE: 600; 310; 30; 20 INJECTION, SOLUTION INTRAVENOUS at 12:06

## 2022-06-23 RX ADMIN — INSULIN ASPART 6 UNITS: 100 INJECTION, SOLUTION INTRAVENOUS; SUBCUTANEOUS at 05:06

## 2022-06-23 RX ADMIN — INSULIN ASPART 6 UNITS: 100 INJECTION, SOLUTION INTRAVENOUS; SUBCUTANEOUS at 11:06

## 2022-06-23 RX ADMIN — INSULIN ASPART 6 UNITS: 100 INJECTION, SOLUTION INTRAVENOUS; SUBCUTANEOUS at 08:06

## 2022-06-23 NOTE — PLAN OF CARE
inpt consult for diabetes educator noted, no inhouse diabetes educator at this campus, spoke with Phuong Scott RN at [phone], outpt diabetic educator (located at [facility]) she states that she can see pt once he is discharged and has an order from his PCP for the diabetic ed classes, spoke with pt he has no pcp at this time but has an appt on July 7th with Dr Guilherme Geronimo to est as a new pt, notified Phuong THOMPSON about pts appt, she states Dr Geronimo is part of [facility] and she will obtain order from him after pts visit and put him down to set up for education then, notified pt Phuong will be contacting him for education after his appt with Dr Geronimo

## 2022-06-23 NOTE — PROGRESS NOTES
[facility] Hospital Medicine Progress Note        Chief Complaint: Inpatient Follow-up for T2DM    HPI:   This is a 56-year-old male with medical history of prediabetes, testosterone deficiency, Migraine  and obesity sent for urgent care clinic for hyperglycemia and possible new onset diabetes evaluation.  Patient report symptoms started about 2 weeks ago with fatigue, dyspnea on exertion, polyuria, polydipsia,  And over the past 3 days started having chest heaviness/pain, no radiation.  He visited [facility] clinic and was found to have blood glucose over 500 and was sent to the ED.     On arrival to ED, he was hemodynamically stable and afebrile.  EKG NSR,  No ST T-wave changes.  Labs notable for glucose 544, A1c 10.6, CO2 24, normal AG, Cr 1.2, troponin < 0.01. He was given 2 L of normal saline and aspirin 325 mg and referred to hospital medicine service for further evaluation and management.    Interval Hx:   HDS, No acute events overnight. He was comfortably resrting in the bed. Has no new complaints. Wife was at bedside. After discussions he is agreeable to use insulin     Objective/physical exam:  Vitals:    06/23/22 0010 06/23/22 0100 06/23/22 0400 06/23/22 0600   BP: 128/77  125/75 114/82   BP Location:       Patient Position:       Pulse: 83  67 61   Resp: 16  (!) 21 14   Temp:  98.8 °F (37.1 °C) 98.9 °F (37.2 °C)    TempSrc:  Oral Oral    SpO2: 97%  (!) 92% 95%   Weight:       Height:         General: In no acute distress, afebrile  Respiratory: Clear to auscultation bilaterally  Cardiovascular: S1, S2, no appreciable murmur  Abdomen: Soft, nontender, BS +  MSK: Warm, no lower extremity edema, no clubbing or cyanosis  Neurologic: Alert and oriented x4, moving all extremities with good strength     Lab Results   Component Value Date     06/23/2022    K 4.5 06/23/2022    CO2 23 06/23/2022    BUN 8.4 06/23/2022    CREATININE 0.87 06/23/2022    CALCIUM 9.2 06/23/2022     EGFRNONAA 101 05/12/2021      Lab Results   Component Value Date    ALT 27 06/22/2022    AST 18 06/22/2022    ALKPHOS 144 06/22/2022    BILITOT 0.4 06/22/2022      Lab Results   Component Value Date    WBC 5.6 06/23/2022    HGB 11.7 (L) 06/23/2022    HCT 36.9 (L) 06/23/2022    MCV 86.2 06/23/2022     06/23/2022       Medications:   aspirin  81 mg Oral Daily    atorvastatin  40 mg Oral Daily    enoxaparin  40 mg Subcutaneous Daily    insulin detemir U-100  15 Units Subcutaneous QHS      acetaminophen, acetaminophen, aluminum-magnesium hydroxide-simethicone, dextrose 10%, dextrose 10%, glucagon (human recombinant), glucose, glucose, insulin aspart U-100, melatonin, nitroGLYCERIN, ondansetron, polyethylene glycol, prochlorperazine, senna-docusate 8.6-50 mg, simethicone, sodium chloride 0.9%     Assessment/Plan:    Newly diagnosed T2M, , HbA1c 10.6  Hyperglycemia due to above  SOB, RAYO. ACS ruled out  CALLIE- improving  HLD    Hx: Migraine, Obesity, possible KEYSHA    Plan:  - Increased Detemir to 20 units QHS, add meal time Apart 6 units and continue sliding scale correction. Calculate requirements and adjust the regimen upon dc  - Provide diabetic education, diabetic supplies. Will go homem with insulin  - Continue ASA &  statin. Educated on medication compliance, importance of glycemic control, PCP  Follow up, diabetic diet, weight loss,   - Encourage Po hydration. DC fluids  - trop wnl. Will follow up TTE results    Labs  FULL code  Possible dc tomorrow      Steffen Lyons MD

## 2022-06-23 NOTE — ED NOTES
Placed pt on cardiac monitor, comfort measures, and safety measure in place, pt and so in agreement with poc, urinals at bedside

## 2022-06-23 NOTE — H&P
[facility] Hospital Medicine   History & Physical Note      Patient Name: Chadwick Lorenzo  : 1965  MRN: [id number]  Patient Class: OP- Observation   Admission Date: 2022   Length of Stay: 0  Admitting Service:  Service  Attending Physician: Pato Coronel MD  PCP: Guilherme Geronimo MD  History Source: Patient, patient's family, and EMR.   Face-to-Face encounter date: 2022  Code status: Full    Chief Complaint   Hyperglycemia    History of Present Illness   This is a 56-year-old male with medical history of prediabetes  and obesity sent for urgent care clinic for hyperglycemia and possible new onset diabetes evaluation.  Patient report symptoms started about 2 weeks ago with fatigue, dyspnea on exertion, polyuria, polydipsia,  And over the past 3 days started having chest heaviness/pain, no radiation.  He visited [facility] clinic and was found to have blood glucose over 500 and was sent to the ED.    On arrival to ED, he was hemodynamically stable and afebrile.  EKG NSR,  No ST T-wave changes.  Labs notable for glucose 544, A1c 10.6, CO2 24, normal AG, Cr 1.2, troponin < 0.01.     He was given 2 L of normal saline and aspirin 325 mg and referred to hospital medicine service for further evaluation and management.    ROS   Except as documented, all other systems reviewed and negative     Past Medical History   · Pre-diabetes  · Nephrolithiasis  · Migraine  · Obesity  · Testosterone deficiency    Past Surgical History      kidney stones removal    rotator cuff surgery     Social History     Tobacco Use    Smoking status: Never Smoker    Smokeless tobacco: Never Used   Substance Use Topics    Alcohol use: Not Currently      Family History   Reviewed and negative    Allergies   Patient has no known allergies.    Home Medications     Prior to Admission medications    Medication Sig Start Date End Date Taking? Authorizing Provider   albuterol (PROVENTIL/VENTOLIN HFA) 90  mcg/actuation inhaler Inhale 90 puffs into the lungs daily as needed. 2/3/22   Historical Provider   NURTEC 75 mg odt Take 75 mg by mouth daily as needed. 1/14/22   Historical Provider        Inpatient Medications   Scheduled Meds   aspirin  81 mg Oral Daily    enoxaparin  40 mg Subcutaneous Daily    insulin detemir U-100  15 Units Subcutaneous QHS     Continuous Infusions   lactated ringers 100 mL/hr at 06/23/22 0047     PRN Meds  acetaminophen, acetaminophen, aluminum-magnesium hydroxide-simethicone, dextrose 10%, dextrose 10%, glucagon (human recombinant), glucose, glucose, insulin aspart U-100, melatonin, nitroGLYCERIN, ondansetron, polyethylene glycol, prochlorperazine, senna-docusate 8.6-50 mg, simethicone, sodium chloride 0.9%    Physical Exam   Vital Signs  Temp:  [98.2 °F (36.8 °C)-98.8 °F (37.1 °C)]   Pulse:  [69-96]   Resp:  [17-19]   BP: (110-130)/(79-83)   SpO2:  [96 %-98 %]    General: Appears comfortable  HEENT: NC/AT  Neck:  No JVD  Chest: CTABL  CVS: Regular rhythm. Normal S1/S2. No pedal edema  Abdomen: nondistended, normoactive BS, soft and non-tender.  MSK: No obvious deformity or joint swelling  Skin: Warm and dry  Neuro: AAOx3, no focal neurological deficit  Psych: Cooperative    Labs     Recent Labs     06/22/22  1456 06/22/22  2043   WBC 5.8 6.6   RBC 5.06 5.20   HGB 14.2 14.1   HCT 42.7 43.6   MCV 84.4 83.8   MCH 28.1 27.1   MCHC 33.3 32.3*   RDW 13.1 13.2    365       Recent Labs     06/22/22  1456   HGBA1C 10.6*      Recent Labs     06/22/22  1456 06/22/22  1810    135*   K 4.3 4.5   CHLORIDE 106 103   CO2 21* 24   BUN 10.2 11.0   CREATININE 1.04 1.20*   GLUCOSE 486* 544*   CALCIUM 9.7 9.6   ALBUMIN 3.8 3.7   GLOBULIN 3.7* 3.0   ALKPHOS 137 144   ALT 29 27   AST 20 18   BILITOT 0.4 0.4   TSH 1.0344  --      Recent Labs     06/22/22  1810   TROPONINI <0.010          Microbiology Results (last 7 days)     ** No results found for the last 168 hours. **         Imaging      X-Ray Chest AP Portable   Final Result      No acute cardiopulmonary process.         Electronically signed by: Alcon Henry   Date:    06/23/2022   Time:    06:38        Assessment & Plan   1. Newly diagnosed T2DM with symptomatic hyperglycemia - A1c 10.6  2. Angina/RAYO  3. CALLIE    HX Migraine, Obesity    Plan:    Start detemir 15 units qPM  MDSSI q4h  Diabetic education  LR at 100 ml/hr  Troponin q6h x3  ECHO  Lipid panel, urine Pr/Cr  Start aspirin 81mg daily and atorvastatin 40 mg daily  If troponin remained negative and ECHO unremarkable will need cardiology referral/stress test  VTE Prophylaxis: Enoxaparin    Critical care time:  32 minutes  Critical care diagnosis: Symptomatic hyperglycemia    Pato Coronel MD  [facility] Medicine

## 2022-06-23 NOTE — ED PROVIDER NOTES
"Encounter Date: 6/22/2022    SCRIBE #1 NOTE: I, Jayme Serrano , am scribing for, and in the presence of,  Rere Guerrero. I have scribed the following portions of the note - Other sections scribed: HPI, ROS, PE, MDM.       History     Chief Complaint   Patient presents with    Hyperglycemia     Pt states that he has been having polyuria, thirst, fatigue, chest heaviness. States that he was told by his pcp to come to ED for dx of new onset diabetes. CBG over 500 in triage     I, Rere Guerrero, assumed care at 2008\    57 y/o M presents to ED with c/o polyuria, thirst, fatigue, and chest heaviness. Pt reports being seen by PCP for routine testing and he got some lab work done which came back abnormal (CBG was in the 500's); he explains he was told he would be contacted in a week with his results, but he got a call soon after and was advised to come to the ED. He states all week long he has been urinating about 5/6 times a night. Pt explains having chest tightness/palpations and states last night he could feel his heartbeat; he also c/o having some SOB with minimal exertion. The SOB is better at rest and he also states having asthma, but he can tell this SOB is different when dealing with his asthma. Pt states becoming dizzy and lightheaded with certain movements as well. This morning he reports waking up feeling better, but when it was time for work and reports the symptons returning and saying "I cant do this Today", so he did not go into work. Pt has also lost 11 lbs over the coarse of this 1 week. Denies Smoking. Pt has family hx of his grandmas having DM, his dad having heart valves issues, and his uncles having heart attacks.     The history is provided by the patient. No  was used.   Chest Pain  The current episode started several days ago. Chest pain occurs constantly. The quality of the pain is described as heavy. The pain does not radiate. Chest pain is worsened by exertion. Primary " symptoms include fatigue, shortness of breath, palpitations and dizziness. Pertinent negatives for primary symptoms include no fever, no wheezing, no abdominal pain, no nausea and no vomiting.   The shortness of breath began more than 2 days ago. The shortness of breath developed with exertion. The patient's medical history is significant for asthma.   The palpitations also occurred with dizziness and shortness of breath.     Dizziness does not occur with nausea or vomiting.   He tried nothing for the symptoms. There are no known risk factors.   His family medical history is significant for diabetes.     Review of patient's allergies indicates:  No Known Allergies  Past Medical History:   Diagnosis Date    IGT (impaired glucose tolerance)     Kidney stones     Low testosterone in male     Migraine     Morbid obesity     Vitamin D deficiency      Past Surgical History:   Procedure Laterality Date    kidney stones removal      rotator cuff surgery       Family History   Problem Relation Age of Onset    Cancer Mother      Social History     Tobacco Use    Smoking status: Never Smoker    Smokeless tobacco: Never Used   Substance Use Topics    Alcohol use: Not Currently    Drug use: Never     Review of Systems   Constitutional: Positive for fatigue and unexpected weight change. Negative for fever.   HENT: Negative for congestion and rhinorrhea.    Eyes: Negative for pain.   Respiratory: Positive for chest tightness and shortness of breath. Negative for wheezing.    Cardiovascular: Positive for palpitations.   Gastrointestinal: Negative for abdominal pain, constipation, diarrhea, nausea and vomiting.   Genitourinary: Positive for frequency. Negative for dysuria.   Musculoskeletal: Negative for back pain and neck pain.   Skin: Negative for rash.   Allergic/Immunologic: Negative for environmental allergies, food allergies and immunocompromised state.   Neurological: Positive for dizziness and light-headedness.  Negative for speech difficulty.   Hematological: Does not bruise/bleed easily.   Psychiatric/Behavioral: Negative for sleep disturbance and suicidal ideas.       Physical Exam     Initial Vitals [06/22/22 1750]   BP Pulse Resp Temp SpO2   130/79 96 18 98.2 °F (36.8 °C) 98 %      MAP       --         Physical Exam    Nursing note and vitals reviewed.  Constitutional: He appears well-developed and well-nourished. He is not diaphoretic. No distress.   HENT:   Head: Normocephalic and atraumatic.   Nose: Nose normal.   Mouth/Throat: Oropharynx is clear and moist. Mucous membranes are dry.   Eyes: Conjunctivae and EOM are normal. Pupils are equal, round, and reactive to light.   Neck: Trachea normal. Neck supple.   Normal range of motion.  Cardiovascular: Normal rate, regular rhythm, normal heart sounds and intact distal pulses. Exam reveals no gallop and no friction rub.    No murmur heard.  Pulmonary/Chest: Breath sounds normal. No respiratory distress. He has no wheezes. He has no rhonchi. He has no rales. He exhibits no tenderness.   Abdominal: Abdomen is soft. Bowel sounds are normal. He exhibits no distension and no mass. There is no abdominal tenderness. There is no rebound.   Musculoskeletal:         General: No tenderness or edema. Normal range of motion.      Cervical back: Normal range of motion and neck supple.      Lumbar back: Normal. No tenderness. Normal range of motion.     Neurological: He is alert and oriented to person, place, and time. He has normal strength. No cranial nerve deficit or sensory deficit.   Skin: Skin is warm and dry. Capillary refill takes less than 2 seconds. No rash and no abscess noted. No erythema. No pallor.   Psychiatric: He has a normal mood and affect. His behavior is normal. Judgment and thought content normal.         ED Course   Procedures  Labs Reviewed   COMPREHENSIVE METABOLIC PANEL - Abnormal; Notable for the following components:       Result Value    Sodium Level 135  (*)     Glucose Level 544 (*)     Creatinine 1.20 (*)     All other components within normal limits   CBC WITH DIFFERENTIAL - Abnormal; Notable for the following components:    MCHC 32.3 (*)     Neut # 2.0 (*)     All other components within normal limits   LIPID PANEL - Abnormal; Notable for the following components:    Cholesterol Total 231 (*)     HDL Cholesterol 33 (*)     Triglyceride 304 (*)     Cholesterol/HDL Ratio 7 (*)     All other components within normal limits   PROTEIN / CREATININE RATIO, URINE - Abnormal; Notable for the following components:    Urine Creatinine 54.1 (*)     All other components within normal limits   POCT GLUCOSE, HAND-HELD DEVICE - Abnormal; Notable for the following components:    POC Glucose 336 (*)     All other components within normal limits   POCT GLUCOSE - Abnormal; Notable for the following components:    POCT Glucose >500 (*)     All other components within normal limits   TROPONIN I - Normal   TROPONIN I - Normal   CBC W/ AUTO DIFFERENTIAL    Narrative:     The following orders were created for panel order CBC auto differential.  Procedure                               Abnormality         Status                     ---------                               -----------         ------                     CBC with Differential[[id number]]        Abnormal            Final result                 Please view results for these tests on the individual orders.   TROPONIN I   CBC W/ AUTO DIFFERENTIAL    Narrative:     The following orders were created for panel order CBC Auto Differential.  Procedure                               Abnormality         Status                     ---------                               -----------         ------                     CBC with Differential[[id number]]                                                         Please view results for these tests on the individual orders.   BASIC METABOLIC PANEL   MAGNESIUM   PHOSPHORUS   CBC WITH DIFFERENTIAL    POCT GLUCOSE MONITORING CONTINUOUS   POCT GLUCOSE MONITORING CONTINUOUS   POCT GLUCOSE MONITORING CONTINUOUS   POCT GLUCOSE MONITORING CONTINUOUS   POCT GLUCOSE MONITORING CONTINUOUS   POCT GLUCOSE MONITORING CONTINUOUS     EKG Readings: (Independently Interpreted)   Initial Reading: No STEMI. Rhythm: Normal Sinus Rhythm. Heart Rate: 86. Ectopy: No Ectopy. Conduction: Normal. ST Segments: Normal ST Segments.   2055  Sinus rhythm with nonspecific t wave abnormality       Imaging Results          X-Ray Chest AP Portable (In process)                  Medications   glucose chewable tablet 16 g (has no administration in time range)   glucose chewable tablet 24 g (has no administration in time range)   glucagon (human recombinant) injection 1 mg (has no administration in time range)   dextrose 10% bolus 125 mL (has no administration in time range)   dextrose 10% bolus 250 mL (has no administration in time range)   insulin aspart U-100 injection 1-10 Units (has no administration in time range)   sodium chloride 0.9% flush 10 mL (has no administration in time range)   melatonin tablet 6 mg (6 mg Oral Given 6/23/22 0045)   ondansetron injection 4 mg (has no administration in time range)   prochlorperazine injection Soln 5 mg (has no administration in time range)   polyethylene glycol packet 17 g (has no administration in time range)   senna-docusate 8.6-50 mg per tablet 1 tablet (has no administration in time range)   acetaminophen tablet 650 mg (has no administration in time range)   simethicone chewable tablet 80 mg (has no administration in time range)   aluminum-magnesium hydroxide-simethicone 200-200-20 mg/5 mL suspension 30 mL (has no administration in time range)   acetaminophen tablet 1,000 mg (has no administration in time range)   enoxaparin injection 40 mg (has no administration in time range)   insulin detemir U-100 injection 15 Units (has no administration in time range)   lactated ringers infusion (  Intravenous New Bag 6/23/22 0047)   aspirin chewable tablet 81 mg (has no administration in time range)   nitroGLYCERIN SL tablet 0.4 mg (has no administration in time range)   sodium chloride 0.9% bolus 1,000 mL (0 mLs Intravenous Stopped 6/22/22 2356)   sodium chloride 0.9% bolus 1,000 mL (0 mLs Intravenous Stopped 6/22/22 2154)   aspirin EC tablet 325 mg (325 mg Oral Given 6/22/22 2051)   insulin detemir U-100 injection 15 Units (15 Units Subcutaneous Given 6/23/22 0046)     Medical Decision Making:   History:   Old Medical Records: I decided to obtain old medical records.  Old Records Summarized: records from clinic visits.  Initial Assessment:   New onset diabetes, intermitetnt shortness of breath, chest pain  Differential Diagnosis:   Acs, atypical chest pain, wekaness due to dehydration, new onset dm  Independently Interpreted Test(s):   I have ordered and independently interpreted X-rays - see prior notes.  I have ordered and independently interpreted EKG Reading(s) - see prior notes  Clinical Tests:   Lab Tests: Ordered and Reviewed  Radiological Study: Ordered and Reviewed  Medical Tests: Ordered and Reviewed  ED Management:  Ivf, insulin, asa, admit  Other:   I have discussed this case with another health care provider.          Scribe Attestation:   Scribe #1: I performed the above scribed service and the documentation accurately describes the services I performed. I attest to the accuracy of the note.  Comments: Attending:   Physician Attestation Statement for Scribe #1: Rere MILLS MD, personally performed the services described in this documentation. All medical record entries made by the scribe were at my direction and in my presence.  I have reviewed the chart and agree that the record reflects my personal performance and is accurate and complete.      Attending Attestation:           Physician Attestation for Scribe:  Physician Attestation Statement for Scribe #1: Rere MILLS  reviewed documentation, as scribed by Jayme Serrano in my presence, and it is both accurate and complete.             ED Course as of 06/23/22 0217 Wed Jun 22, 2022 2103 9:06 PM  RE-EVALUATION:  The patient is resting comfortably and in NAD. Discussed test results, shared treatment plan, and the need for admission. Pt feels comfortable with the plan as discussed. Answered questions at this time.   [BS]   2208 Hospitalist consulted [BS]      ED Course User Index  [BS] Rere Guerrero MD             Clinical Impression:   Final diagnoses:  [R07.89] Chest tightness  [N17.9] CALLIE (acute kidney injury) (Primary)  [E86.0] Dehydration  [E11.9] New onset type 2 diabetes mellitus          ED Disposition Condition    Observation               Rere Guerrero MD  06/23/22 0217

## 2022-06-24 VITALS
OXYGEN SATURATION: 95 % | DIASTOLIC BLOOD PRESSURE: 88 MMHG | SYSTOLIC BLOOD PRESSURE: 120 MMHG | HEART RATE: 68 BPM | RESPIRATION RATE: 19 BRPM | HEIGHT: 68 IN | WEIGHT: 285.94 LBS | BODY MASS INDEX: 43.34 KG/M2 | TEMPERATURE: 98 F

## 2022-06-24 PROBLEM — I42.9 CARDIOMYOPATHY: Chronic | Status: ACTIVE | Noted: 2022-06-24

## 2022-06-24 LAB
ALBUMIN SERPL-MCNC: 3.4 GM/DL (ref 3.5–5)
ALBUMIN/GLOB SERPL: 1.4 RATIO (ref 1.1–2)
ALP SERPL-CCNC: 110 UNIT/L (ref 40–150)
ALT SERPL-CCNC: 23 UNIT/L (ref 0–55)
AST SERPL-CCNC: 17 UNIT/L (ref 5–34)
AV INDEX (PROSTH): 0.73
AV MEAN GRADIENT: 2 MMHG
AV PEAK GRADIENT: 4 MMHG
AV REGURGITATION PRESSURE HALF TIME: 509 MS
AV VALVE AREA: 2.53 CM2
AV VELOCITY RATIO: 0.79
BASOPHILS # BLD AUTO: 0.03 X10(3)/MCL (ref 0–0.2)
BASOPHILS NFR BLD AUTO: 0.6 %
BILIRUBIN DIRECT+TOT PNL SERPL-MCNC: 0.6 MG/DL
BSA FOR ECHO PROCEDURE: 2.49 M2
BUN SERPL-MCNC: 7.3 MG/DL (ref 8.4–25.7)
CALCIUM SERPL-MCNC: 9.2 MG/DL (ref 8.4–10.2)
CHLORIDE SERPL-SCNC: 106 MMOL/L (ref 98–107)
CO2 SERPL-SCNC: 22 MMOL/L (ref 22–29)
CREAT SERPL-MCNC: 0.74 MG/DL (ref 0.73–1.18)
CV ECHO LV RWT: 0.78 CM
DEPRECATED CALCIDIOL+CALCIFEROL SERPL-MC: 6.6 NG/ML (ref 30–80)
DOP CALC AO PEAK VEL: 1.05 M/S
DOP CALC AO VTI: 23.1 CM
DOP CALC LVOT AREA: 3.5 CM2
DOP CALC LVOT DIAMETER: 2.1 CM
DOP CALC LVOT PEAK VEL: 0.83 M/S
DOP CALC LVOT STROKE VOLUME: 58.51 CM3
DOP CALC MV VTI: 19.5 CM
DOP CALCLVOT PEAK VEL VTI: 16.9 CM
E WAVE DECELERATION TIME: 203 MSEC
E/A RATIO: 0.79
E/E' RATIO: 4.47 M/S
ECHO LV POSTERIOR WALL: 1.64 CM (ref 0.6–1.1)
EJECTION FRACTION: 25 %
EOSINOPHIL # BLD AUTO: 0.26 X10(3)/MCL (ref 0–0.9)
EOSINOPHIL NFR BLD AUTO: 5.2 %
ERYTHROCYTE [DISTWIDTH] IN BLOOD BY AUTOMATED COUNT: 13.2 % (ref 11.5–17)
FRACTIONAL SHORTENING: 33 % (ref 28–44)
GLOBULIN SER-MCNC: 2.5 GM/DL (ref 2.4–3.5)
GLUCOSE SERPL-MCNC: 221 MG/DL (ref 74–100)
HCT VFR BLD AUTO: 39.3 % (ref 42–52)
HGB BLD-MCNC: 12.4 GM/DL (ref 14–18)
IMM GRANULOCYTES # BLD AUTO: 0.01 X10(3)/MCL (ref 0–0.02)
IMM GRANULOCYTES NFR BLD AUTO: 0.2 % (ref 0–0.43)
INTERVENTRICULAR SEPTUM: 1.24 CM (ref 0.6–1.1)
LEFT ATRIUM SIZE: 3.3 CM
LEFT INTERNAL DIMENSION IN SYSTOLE: 2.81 CM (ref 2.1–4)
LEFT VENTRICLE DIASTOLIC VOLUME INDEX: 33.4 ML/M2
LEFT VENTRICLE DIASTOLIC VOLUME: 79.5 ML
LEFT VENTRICLE MASS INDEX: 99 G/M2
LEFT VENTRICLE SYSTOLIC VOLUME INDEX: 12.5 ML/M2
LEFT VENTRICLE SYSTOLIC VOLUME: 29.8 ML
LEFT VENTRICULAR INTERNAL DIMENSION IN DIASTOLE: 4.22 CM (ref 3.5–6)
LEFT VENTRICULAR MASS: 235.86 G
LV LATERAL E/E' RATIO: 3.8 M/S
LV SEPTAL E/E' RATIO: 5.43 M/S
LVOT MG: 1 MMHG
LVOT MV: 0.53 CM/S
LYMPHOCYTES # BLD AUTO: 2.92 X10(3)/MCL (ref 0.6–4.6)
LYMPHOCYTES NFR BLD AUTO: 57.9 %
MAGNESIUM SERPL-MCNC: 1.7 MG/DL (ref 1.6–2.6)
MCH RBC QN AUTO: 27.4 PG (ref 27–31)
MCHC RBC AUTO-ENTMCNC: 31.6 MG/DL (ref 33–36)
MCV RBC AUTO: 86.8 FL (ref 80–94)
MONOCYTES # BLD AUTO: 0.28 X10(3)/MCL (ref 0.1–1.3)
MONOCYTES NFR BLD AUTO: 5.6 %
MV MEAN GRADIENT: 1 MMHG
MV PEAK A VEL: 0.48 M/S
MV PEAK E VEL: 0.38 M/S
MV PEAK GRADIENT: 2 MMHG
MV STENOSIS PRESSURE HALF TIME: 77 MS
MV VALVE AREA BY CONTINUITY EQUATION: 3 CM2
MV VALVE AREA P 1/2 METHOD: 2.86 CM2
NEUTROPHILS # BLD AUTO: 1.5 X10(3)/MCL (ref 2.1–9.2)
NEUTROPHILS NFR BLD AUTO: 30.5 %
NRBC BLD AUTO-RTO: 0 %
PISA AR MAX VEL: 1.74 M/S
PISA TR MAX VEL: 1.4 M/S
PLATELET # BLD AUTO: 301 X10(3)/MCL (ref 130–400)
PMV BLD AUTO: 10.8 FL (ref 9.4–12.4)
POCT GLUCOSE: 124 MG/DL (ref 70–110)
POCT GLUCOSE: 171 MG/DL (ref 70–110)
POCT GLUCOSE: 218 MG/DL (ref 70–110)
POCT GLUCOSE: 287 MG/DL (ref 70–110)
POTASSIUM SERPL-SCNC: 4.2 MMOL/L (ref 3.5–5.1)
PROT SERPL-MCNC: 5.9 GM/DL (ref 6.4–8.3)
PV PEAK VELOCITY: 1.08 CM/S
RA PRESSURE: 8 MMHG
RBC # BLD AUTO: 4.53 X10(6)/MCL (ref 4.7–6.1)
RIGHT VENTRICULAR END-DIASTOLIC DIMENSION: 2.44 CM
SODIUM SERPL-SCNC: 136 MMOL/L (ref 136–145)
TDI LATERAL: 0.1 M/S
TDI SEPTAL: 0.07 M/S
TDI: 0.09 M/S
TR MAX PG: 8 MMHG
TRICUSPID ANNULAR PLANE SYSTOLIC EXCURSION: 2.04 CM
TV REST PULMONARY ARTERY PRESSURE: 16 MMHG
WBC # SPEC AUTO: 5 X10(3)/MCL (ref 4.5–11.5)

## 2022-06-24 PROCEDURE — G0378 HOSPITAL OBSERVATION PER HR: HCPCS

## 2022-06-24 PROCEDURE — 85025 COMPLETE CBC W/AUTO DIFF WBC: CPT | Performed by: INTERNAL MEDICINE

## 2022-06-24 PROCEDURE — 80053 COMPREHEN METABOLIC PANEL: CPT | Performed by: INTERNAL MEDICINE

## 2022-06-24 PROCEDURE — 25000003 PHARM REV CODE 250: Performed by: INTERNAL MEDICINE

## 2022-06-24 PROCEDURE — 96372 THER/PROPH/DIAG INJ SC/IM: CPT | Performed by: INTERNAL MEDICINE

## 2022-06-24 PROCEDURE — 63600175 PHARM REV CODE 636 W HCPCS: Performed by: INTERNAL MEDICINE

## 2022-06-24 PROCEDURE — 36415 COLL VENOUS BLD VENIPUNCTURE: CPT | Performed by: INTERNAL MEDICINE

## 2022-06-24 PROCEDURE — 83735 ASSAY OF MAGNESIUM: CPT | Performed by: INTERNAL MEDICINE

## 2022-06-24 PROCEDURE — 82306 VITAMIN D 25 HYDROXY: CPT | Performed by: INTERNAL MEDICINE

## 2022-06-24 RX ORDER — METOPROLOL SUCCINATE 25 MG/1
25 TABLET, EXTENDED RELEASE ORAL DAILY
Qty: 90 TABLET | Refills: 0 | Status: SHIPPED | OUTPATIENT
Start: 2022-06-24 | End: 2022-09-08 | Stop reason: SDUPTHER

## 2022-06-24 RX ORDER — INSULIN ASPART 100 [IU]/ML
6 INJECTION, SOLUTION INTRAVENOUS; SUBCUTANEOUS
Qty: 10 ML | Refills: 1 | Status: SHIPPED | OUTPATIENT
Start: 2022-06-24 | End: 2022-06-24 | Stop reason: HOSPADM

## 2022-06-24 RX ORDER — DEXTROSE 4 G
1 TABLET,CHEWABLE ORAL
Qty: 1 EACH | Refills: 0 | Status: SHIPPED | OUTPATIENT
Start: 2022-06-24 | End: 2022-06-24 | Stop reason: HOSPADM

## 2022-06-24 RX ORDER — LANCETS
1 EACH MISCELLANEOUS
Qty: 100 EACH | Refills: 11 | Status: SHIPPED | OUTPATIENT
Start: 2022-06-24

## 2022-06-24 RX ORDER — METOPROLOL SUCCINATE 25 MG/1
25 TABLET, EXTENDED RELEASE ORAL DAILY
Qty: 90 TABLET | Refills: 0 | Status: SHIPPED | OUTPATIENT
Start: 2022-06-24 | End: 2022-06-24 | Stop reason: SDUPTHER

## 2022-06-24 RX ORDER — INSULIN DETEMIR 100 [IU]/ML
40 INJECTION, SOLUTION SUBCUTANEOUS NIGHTLY
Qty: 12 ML | Refills: 0 | Status: SHIPPED | OUTPATIENT
Start: 2022-06-24 | End: 2022-10-04

## 2022-06-24 RX ORDER — LANCING DEVICE
1 EACH MISCELLANEOUS
Qty: 1 EACH | Refills: 0 | Status: SHIPPED | OUTPATIENT
Start: 2022-06-24

## 2022-06-24 RX ORDER — LANCETS
1 EACH MISCELLANEOUS
Qty: 100 EACH | Refills: 11 | Status: SHIPPED | OUTPATIENT
Start: 2022-06-24 | End: 2022-06-24 | Stop reason: SDUPTHER

## 2022-06-24 RX ORDER — SACUBITRIL AND VALSARTAN 24; 26 MG/1; MG/1
1 TABLET, FILM COATED ORAL 2 TIMES DAILY
Qty: 180 TABLET | Refills: 0 | Status: SHIPPED | OUTPATIENT
Start: 2022-06-24 | End: 2022-06-24 | Stop reason: SDUPTHER

## 2022-06-24 RX ORDER — PEN NEEDLE, DIABETIC 30 GX3/16"
1 NEEDLE, DISPOSABLE MISCELLANEOUS
Qty: 100 EACH | Refills: 11 | Status: SHIPPED | OUTPATIENT
Start: 2022-06-24 | End: 2022-06-24 | Stop reason: HOSPADM

## 2022-06-24 RX ORDER — DEXTROSE 4 G
1 TABLET,CHEWABLE ORAL
Qty: 1 EACH | Refills: 0 | Status: SHIPPED | OUTPATIENT
Start: 2022-06-24

## 2022-06-24 RX ORDER — LANCING DEVICE
1 EACH MISCELLANEOUS
Qty: 1 EACH | Refills: 0 | Status: SHIPPED | OUTPATIENT
Start: 2022-06-24 | End: 2022-06-24 | Stop reason: SDUPTHER

## 2022-06-24 RX ORDER — SACUBITRIL AND VALSARTAN 24; 26 MG/1; MG/1
1 TABLET, FILM COATED ORAL 2 TIMES DAILY
Qty: 180 TABLET | Refills: 0 | Status: SHIPPED | OUTPATIENT
Start: 2022-06-24 | End: 2022-09-08 | Stop reason: SDUPTHER

## 2022-06-24 RX ADMIN — INSULIN ASPART 6 UNITS: 100 INJECTION, SOLUTION INTRAVENOUS; SUBCUTANEOUS at 07:06

## 2022-06-24 RX ADMIN — ATORVASTATIN CALCIUM 40 MG: 40 TABLET, FILM COATED ORAL at 09:06

## 2022-06-24 RX ADMIN — INSULIN ASPART 6 UNITS: 100 INJECTION, SOLUTION INTRAVENOUS; SUBCUTANEOUS at 12:06

## 2022-06-24 RX ADMIN — Medication 81 MG: at 09:06

## 2022-06-24 NOTE — DISCHARGE SUMMARY
[facility] Hospital Medicine Discharge Summary    Admit Date: 6/22/2022  Discharge Date and Time: 6/24/2022 4:57 PM  Admitting Physician: Pato Coronel MD  Discharging Physician: Joel Lorenzo MD.  Primary Care Physician: Guilherme Geronimo MD  Consults: Reyna Iyer MD with cardiology    Discharge Diagnoses:  Newly diagnosed insulin-dependent type 2 diabetes mellitus  Newly diagnosed cardiomyopathy with a left ventricular ejection fraction of 25%  Acute kidney injury, resolved  Metabolic acidosis, resolved  Morbid obesity      Hospital Course:   This is a 56-year-old  male with medical history of prediabetes, testosterone deficiency, migraine headaches and obesity sent from an urgent care clinic for hyperglycemia and possible new onset diabetes evaluation.  Patient report symptoms started about 2 weeks ago with fatigue, dyspnea on exertion, polyuria, polydipsia.  Over the past 3 days he started having chest heaviness/pain, no radiation.  He visited [facility] and was found to have blood glucose over 500 and was sent to the ED.     On arrival to ED, he was hemodynamically stable and afebrile.  EKG NSR,  No ST T-wave changes.  Labs notable for glucose 544, HgbA1c 10.6, CO2 24, normal anion gap, Cr 1.2, troponin < 0.01. He was given 2 L of normal saline and aspirin 325 mg and referred to the hospital medicine service for further evaluation and management.  Patient was started on both preprandial as well as long-acting basal insulin with improvement in his capillary blood glucose levels.  He had a visit from the dietitian to review a diabetic diet.  He is slated to me with a diabetic educator as an outpatient.  He had an incidental finding of cardiomyopathy with a left ventricular ejection fraction of 25%.  Cardiology was consulted who recommended initiating Toprol 25 mg by mouth daily and Entresto 24/26 by mouth twice daily.  Patient will not qualify for a  life vest as he has no documented myocardial infarction and his left and right ventricles are not dilated.  He is to follow up with Cardiology [facility] in 7-10 days for an outpatient ischemic evaluation.  Patient was seen and examined on the day of discharge.    Vitals:  VITAL SIGNS: 24 HRS MIN & MAX LAST   Temp  Min: 97.3 °F (36.3 °C)  Max: 98.1 °F (36.7 °C) 98.1 °F (36.7 °C)   BP  Min: 115/75  Max: 128/87 120/88   Pulse  Min: 61  Max: 82  68   Resp  Min: 18  Max: 20 19   SpO2  Min: 95 %  Max: 97 % 95 %       Physical Exam:  General:  Morbidly obese  male in no acute distress  HENT: normocephalic, atraumatic  Eye: PERRL, EOMI, clear conjunctiva  Neck: full ROM, no thyromegaly, no JVD  Respiratory: clear to auscultation bilaterally  Cardiovascular: regular rate and rhythm  Gastrointestinal: non-distended, positive bowel sounds, non-tender  Musculoskeletal: no gross deformity  Integumentary: warm, dry, intact, no rashes  Neurological: cranial nerves grossly intact, no focal neurological deficit  Psychiatric: cooperative      Procedures Performed: No admission procedures for hospital encounter.     Significant Diagnostic Studies: See Full reports for all details    Recent Labs   Lab 06/22/22  2043 06/23/22  0420 06/24/22  0408   WBC 6.6 5.6 5.0   RBC 5.20 4.28* 4.53*   HGB 14.1 11.7* 12.4*   HCT 43.6 36.9* 39.3*   MCV 83.8 86.2 86.8   MCH 27.1 27.3 27.4   MCHC 32.3* 31.7* 31.6*   RDW 13.2 13.3 13.2    290 301   MPV 10.5 10.2 10.8       Recent Labs   Lab 06/22/22  1456 06/22/22  1810 06/23/22  0420 06/24/22  0408    135* 137 136   K 4.3 4.5 4.5 4.2   CO2 21* 24 23 22   BUN 10.2 11.0 8.4 7.3*   CREATININE 1.04 1.20* 0.87 0.74   CALCIUM 9.7 9.6 9.2 9.2   MG  --   --  1.90 1.70   ALBUMIN 3.8 3.7  --  3.4*   ALKPHOS 137 144  --  110   ALT 29 27  --  23   AST 20 18  --  17   BILITOT 0.4 0.4  --  0.6        Microbiology Results (last 7 days)     ** No results found for the last 168 hours. **            Echo  Addendum: · Concentric hypertrophy and severely decreased systolic function.   · The estimated ejection fraction is 25%.   · Grade I left ventricular diastolic dysfunction.   · Mild aortic regurgitation.   · Normal right ventricular size with normal right ventricular systolic  function.   · Intermediate central venous pressure (8 mmHg).   · The estimated PA systolic pressure is 16 mmHg.   · There is severe left ventricular global hypokinesis.  Narrative: · Concentric hypertrophy and severely decreased systolic function.  · The estimated ejection fraction is 45-50%.  · Grade I left ventricular diastolic dysfunction.  · Mild aortic regurgitation.  · Normal right ventricular size with normal right ventricular systolic   function.  · Intermediate central venous pressure (8 mmHg).  · The estimated PA systolic pressure is 16 mmHg.  · There is severe left ventricular global hypokinesis.            Medication List      START taking these medications    blood sugar diagnostic Strp  1 strip by Misc.(Non-Drug; Combo Route) route 3 (three) times daily before meals.     blood-glucose meter Misc  1 Device by Misc.(Non-Drug; Combo Route) route 3 (three) times daily before meals.     ENTRESTO 24-26 mg per tablet  Generic drug: sacubitriL-valsartan  Take 1 tablet by mouth 2 (two) times daily.     insulin regular human 100 unit/mL (3 mL) Inpn pen  Commonly known as: HumuLIN R  Inject 6 Units into the skin 3 (three) times daily before meals.     lancets Misc  1 lancet by Misc.(Non-Drug; Combo Route) route 3 (three) times daily before meals.     lancing device Misc  1 Device by Misc.(Non-Drug; Combo Route) route 3 (three) times daily before meals.     LEVEMIR FLEXTOUCH U-100 INSULN 100 unit/mL (3 mL) Inpn pen  Generic drug: insulin detemir U-100  Inject 40 Units into the skin every evening.     metoprolol succinate 25 MG 24 hr tablet  Commonly known as: TOPROL-XL  Take 1 tablet (25 mg total) by mouth once daily.         CONTINUE taking these medications    albuterol 90 mcg/actuation inhaler  Commonly known as: PROVENTIL/VENTOLIN HFA     NURTEC 75 mg odt  Generic drug: rimegepant           Where to Get Your Medications      These medications were sent to [id number] - [place] - [address] AT [facility] & [address]  [address], [place] [address]    Phone: [phone]   · blood sugar diagnostic Strp  · blood-glucose meter Misc  · ENTRESTO 24-26 mg per tablet  · insulin regular human 100 unit/mL (3 mL) Inpn pen  · lancets Misc  · lancing device Misc  · LEVEMIR FLEXTOUCH U-100 INSULN 100 unit/mL (3 mL) Inpn pen  · metoprolol succinate 25 MG 24 hr tablet          Explained in detail to the patient about the discharge plan, medications, and follow-up visits.  Patient understands and agrees with the treatment plan  Discharge Disposition: Home or Self Care   Discharged Condition: stable  Diet-   Dietary Orders (From admission, onward)     Start     Ordered    06/22/22 2154  Diet diabetic  Diet effective now         06/22/22 2153               Medications per discharge medication reconciliation list  Activity as tolerated   Follow-up Information     Reyna Iyer MD Follow up.    Specialty: Cardiovascular Disease  Why: Please schedule patient with Dr. Iyer at [facility] cardiology clinic per patient request  Contact information:  [address]  [place] [address]  [phone]                         Discharge time 35 minutes    For worsening symptoms, chest pain, shortness of breath, increased abdominal pain, high grade fever, stroke or stroke like symptoms, immediately go to the nearest Emergency Room or call 911 as soon as possible.      Joel He M.D on 6/24/2022. at 5:10 PM.

## 2022-06-24 NOTE — PLAN OF CARE
VICTORINO messaged patient's doctor, Dr. Lorenzo regarding a script for a glucometer. VICTORINO was told that an order was sent to patient's pharmacy. Alessia JAIMES then confirmed this information with patient.

## 2022-06-24 NOTE — PLAN OF CARE
Problem: Adult Inpatient Plan of Care  Goal: Plan of Care Review  Outcome: Ongoing, Progressing  Flowsheets (Taken 6/23/2022 2140)  Plan of Care Reviewed With: patient  Goal: Patient-Specific Goal (Individualized)  Outcome: Ongoing, Progressing  Goal: Absence of Hospital-Acquired Illness or Injury  Outcome: Ongoing, Progressing  Intervention: Identify and Manage Fall Risk  Flowsheets (Taken 6/23/2022 2140)  Safety Promotion/Fall Prevention:   assistive device/personal item within reach   side rails raised x 2   nonskid shoes/socks when out of bed  Intervention: Prevent Skin Injury  Flowsheets (Taken 6/23/2022 2140)  Body Position: position changed independently  Goal: Optimal Comfort and Wellbeing  Outcome: Ongoing, Progressing  Goal: Readiness for Transition of Care  Outcome: Ongoing, Progressing     Problem: Bariatric Environmental Safety  Goal: Safety Maintained with Care  Outcome: Ongoing, Progressing     Problem: Diabetes Comorbidity  Goal: Blood Glucose Level Within Targeted Range  Outcome: Ongoing, Progressing  Intervention: Monitor and Manage Glycemia  Flowsheets (Taken 6/23/2022 2140)  Glycemic Management: blood glucose monitored

## 2022-06-24 NOTE — CONSULTS
Inpatient consult to Cardiology  Consult performed by: [facility] D[facility]  Consult ordered by: [facility]  Cardiology  Consult Note    Patient Name: Chadwick Lorenzo  MRN: [id number]  Admission Date: 6/22/2022  Hospital Length of Stay: 0 days  Code Status: Full Code   Attending Provider: Pato Coronel MD   Consulting Provider: [facility]  Primary Care Physician: Guilherme Geronimo MD  Principal Problem:New onset type 2 diabetes mellitus    Patient information was obtained from patient, past medical records and ER records.     Subjective:     Chief Complaint:  Consulted for new onset cardiomyopathy     HPI:   This is a 56-year-old male, who is unknown to [facility], with history of  Pre diabetes, nephrolithiasis, migraine, obesity, testosterone deficiency.  Presented to [facility] on 06/22/2022 after being sent from urgent care clinic for hyperglycemia and possible new onset diabetes evaluation.  Patient reported that his symptoms started approximately 2 weeks ago with fatigue, RAYO, polyuria, polydipsia in over the past 30 days prior to arrival he started having chest heaviness/ pain with no radiation.  [facility] was found to have a blood glucose over 500 was sent to the ER.  Troponin was negative times multiple sets.  EKG reveals sinus rhythm with  With a nonspecific T-wave abnormality.  An echo was obtained and revealed concentric hypertrophy and severely decreased systolic function with EF of 25% and grade 1 diastolic dysfunction.  [facility] has been consulted for new onset cardiomyopathy.    PMH: Pre diabetes, nephrolithiasis, migraine, obesity, testosterone deficiency  PSH: kidney stone removal, rotator cuff surgery   Social History: Denies EtOH, tobacco, and illicit drug use  Family History: Mother-cancer    Previous Cardiac Diagnostics:   Echo 6.23.22  Concentric hypertrophy and severely decreased systolic function.  The  estimated ejection fraction is 25%.  Grade I left ventricular diastolic dysfunction.  Mild aortic regurgitation.  Normal right ventricular size with normal right ventricular systolic function.  Intermediate central venous pressure (8 mmHg).  The estimated PA systolic pressure is 16 mmHg.  There is severe left ventricular global hypokinesis.    Review of patient's allergies indicates:  No Known Allergies    No current facility-administered medications on file prior to encounter.     Current Outpatient Medications on File Prior to Encounter   Medication Sig    albuterol (PROVENTIL/VENTOLIN HFA) 90 mcg/actuation inhaler Inhale 90 puffs into the lungs daily as needed.    NURTEC 75 mg odt Take 75 mg by mouth daily as needed.       Review of Systems:  Review of Systems   Constitutional: Negative.    HENT: Negative.    Eyes: Negative.    Respiratory: Positive for shortness of breath.    Cardiovascular: Positive for chest pain.   Gastrointestinal: Negative.    Endocrine: Positive for polydipsia and polyuria.   Genitourinary: Negative.    Musculoskeletal: Negative.    Skin: Negative.    Allergic/Immunologic: Negative.    Neurological: Negative.    Hematological: Negative.    Psychiatric/Behavioral: Negative.        Objective:     Vital Signs (Most Recent):  Temp: 97.8 °F (36.6 °C) (06/24/22 0746)  Pulse: 64 (06/24/22 0746)  Resp: 20 (06/24/22 0746)  BP: 125/86 (06/24/22 0746)  SpO2: 96 % (06/24/22 0746) Vital Signs (24h Range):  Temp:  [97.3 °F (36.3 °C)-98.3 °F (36.8 °C)] 97.8 °F (36.6 °C)  Pulse:  [61-82] 64  Resp:  [18-20] 20  SpO2:  [95 %-97 %] 96 %  BP: (115-139)/(75-87) 125/86     Weight: 129.7 kg (285 lb 15 oz)  Body mass index is 43.49 kg/m².    SpO2: 96 %  O2 Device (Oxygen Therapy): room air      Intake/Output Summary (Last 24 hours) at 6/24/2022 1108  Last data filed at 6/23/2022 2200  Gross per 24 hour   Intake 1200 ml   Output --   Net 1200 ml       Lines/Drains/Airways       Peripheral Intravenous Line   Duration                  Peripheral IV - Single Lumen 06/22/22 2051 20 G Anterior;Proximal;Right Forearm 1 day                      Significant Labs:   CMP   Recent Labs   Lab 06/22/22  1456 06/22/22  1810 06/23/22  0420 06/24/22  0408    135* 137 136   K 4.3 4.5 4.5 4.2   CO2 21* 24 23 22   BUN 10.2 11.0 8.4 7.3*   CREATININE 1.04 1.20* 0.87 0.74   CALCIUM 9.7 9.6 9.2 9.2   ALBUMIN 3.8 3.7  --  3.4*   BILITOT 0.4 0.4  --  0.6   ALKPHOS 137 144  --  110   AST 20 18  --  17   ALT 29 27  --  23   , CBC   Recent Labs   Lab 06/22/22  2043 06/23/22  0420 06/24/22  0408   WBC 6.6 5.6 5.0   HGB 14.1 11.7* 12.4*   HCT 43.6 36.9* 39.3*    290 301    and Troponin   Recent Labs   Lab 06/23/22  0013 06/23/22  0420 06/23/22  1247   TROPONINI <0.010 <0.010 <0.010         Significant Imaging:   Imaging Results              X-Ray Chest AP Portable (Final result)  Result time 06/23/22 06:38:08      Final result by Alcon Henry MD (06/23/22 06:38:08)                   Impression:      No acute cardiopulmonary process.      Electronically signed by: Alcon Henry  Date:    06/23/2022  Time:    06:38               Narrative:    EXAMINATION:  XR CHEST AP PORTABLE    CLINICAL HISTORY:  chest pain;    TECHNIQUE:  Single view of the chest    COMPARISON:  05/12/2021    FINDINGS:  No focal opacification, pleural effusion, or pneumothorax.    The cardiomediastinal silhouette is within normal limits.    No acute osseous abnormality.                                        EKG:        Telemetry:  SR    Physical Exam:  Physical Exam  Constitutional:       Appearance: Normal appearance.   HENT:      Head: Normocephalic.   Eyes:      Extraocular Movements: Extraocular movements intact.      Conjunctiva/sclera: Conjunctivae normal.   Cardiovascular:      Rate and Rhythm: Normal rate and regular rhythm.      Pulses: Normal pulses.      Heart sounds: Normal heart sounds.   Pulmonary:      Effort: Pulmonary effort is normal.       Breath sounds: Normal breath sounds.   Abdominal:      Palpations: Abdomen is soft.   Musculoskeletal:         General: Normal range of motion.      Cervical back: Neck supple.   Skin:     General: Skin is warm and dry.   Neurological:      Mental Status: He is alert and oriented to person, place, and time. Mental status is at baseline.   Psychiatric:         Mood and Affect: Mood normal.         Behavior: Behavior normal.           Home Medications:   No current facility-administered medications on file prior to encounter.     Current Outpatient Medications on File Prior to Encounter   Medication Sig Dispense Refill    albuterol (PROVENTIL/VENTOLIN HFA) 90 mcg/actuation inhaler Inhale 90 puffs into the lungs daily as needed.      NURTEC 75 mg odt Take 75 mg by mouth daily as needed.         Current Inpatient Medications:    Current Facility-Administered Medications:     acetaminophen tablet 1,000 mg, 1,000 mg, Oral, Q6H PRN, Pato Coronel MD, 1,000 mg at 06/23/22 1829    acetaminophen tablet 650 mg, 650 mg, Oral, Q4H PRN, Pato Coronel MD    aluminum-magnesium hydroxide-simethicone 200-200-20 mg/5 mL suspension 30 mL, 30 mL, Oral, QID PRN, Pato Coronel MD    aspirin chewable tablet 81 mg, 81 mg, Oral, Daily, Pato Coronel MD, 81 mg at 06/24/22 0900    atorvastatin tablet 40 mg, 40 mg, Oral, Daily, Pato Coronel MD, 40 mg at 06/24/22 0900    dextrose 10% bolus 125 mL, 12.5 g, Intravenous, PRN, Pato Coronel MD    dextrose 10% bolus 250 mL, 25 g, Intravenous, PRN, Pato Croonel MD    enoxaparin injection 40 mg, 40 mg, Subcutaneous, Daily, Pato Coronel MD, 40 mg at 06/23/22 1700    glucagon (human recombinant) injection 1 mg, 1 mg, Intramuscular, PRN, Pato Coronel MD    glucose chewable tablet 16 g, 16 g, Oral, PRN, Pato Coronel MD    glucose chewable tablet 24 g, 24 g, Oral, PRN, Pato Coronel MD    insulin aspart U-100 injection 1-10 Units, 1-10 Units, Subcutaneous, Q4H PRN, Pato Coronel MD, 6 Units at 06/23/22 1729     insulin aspart U-100 injection 6 Units, 6 Units, Subcutaneous, TIDWM, Steffen Lyons MD, 6 Units at 06/24/22 0736    insulin detemir U-100 injection 20 Units, 20 Units, Subcutaneous, QHS, Steffen Lyons MD, 20 Units at 06/23/22 2122    melatonin tablet 6 mg, 6 mg, Oral, Nightly PRN, Pato Coronel MD, 6 mg at 06/23/22 0045    nitroGLYCERIN SL tablet 0.4 mg, 0.4 mg, Sublingual, Q5 Min PRN, Pato Coronel MD    ondansetron injection 4 mg, 4 mg, Intravenous, Q4H PRN, Pato Coronel MD    polyethylene glycol packet 17 g, 17 g, Oral, BID PRN, Pato Coronel MD    prochlorperazine injection Soln 5 mg, 5 mg, Intravenous, Q6H PRN, Pato Coronel MD    senna-docusate 8.6-50 mg per tablet 1 tablet, 1 tablet, Oral, BID PRN, Pato Coronel MD    simethicone chewable tablet 80 mg, 1 tablet, Oral, QID PRN, Pato Coronel MD    sodium chloride 0.9% flush 10 mL, 10 mL, Intravenous, PRN, Pato Coronel MD           Assessment:     IMPRESSION:  Newly diagnosed CMO (unknown etiology)  Newly diagnosed DM II  CALLIE (resolved)  HLD  Obesity  ? KEYSHA    PLAN:     Plan:  Start Toprol 25mg daily  Start Entresto 24/26mg BID  Patient will not qualify for LifeVest (no documented MI and LV/RV are not dilated)  Plan for outpatient ischemic work-up  F/U with cardiology at [facility] in 7-10 days  Will sign off. Thank you for allowing us to participate in the care of this patient. Please call us with any questions.      Thank you for your consult.     Anthony Dyson, SYLVIA-BC  Cardiology  [facility] - Oncology Acute  06/24/2022 11:08 AM      Reyna Iyer MD  Cardiology-[facility]

## 2022-06-24 NOTE — PROGRESS NOTES
"Nutrition   Progress Note      Recommendations:  1. Continue Diabetic diet as tolerated  2. Educated pt and wife on Diabetic diet      Reason for Evaluation:  Identified at risk by screening criteria    Diagnosis:    1. CALLIE (acute kidney injury)    2. Chest tightness    3. Dehydration    4. New onset type 2 diabetes mellitus    5. Angina at rest    6. Chest pain    7. Hyperglycemia due to insulin deficiency in         Relevant Medical History:    Past Medical History:   Diagnosis Date    IGT (impaired glucose tolerance)     Kidney stones     Low testosterone in male     Migraine     Morbid obesity     Vitamin D deficiency          Nutrition Diet History:    Factors affecting nutritional intake: none identified at this time    Food / Hinduism / Culture Preferences:      Nutrition Prescription Ordered:    Current Diet Order: diabetic diet    Appetite:  good    PO intake: 75 - 100 %      Labs / Medications / Procedures:    Nutrition Related Medications: Insulin Apart, Insulin Determir, SSI    Nutrition Related Labs:  : H/H-12.4/39.3, bun-7.3, Gluc-221. CBG's 171-256      Anthropometrics:  Height: 5' 7.99" (1.727 m)  Admit Weight:  Weight: 129.7 kg (286 lb)  Latest Weight:  129.7 kg (285 lb 15 oz)    Wt Readings from Last 5 Encounters:   22 129.7 kg (285 lb 15 oz)   22 130.1 kg (286 lb 12.8 oz)   22 133.3 kg (293 lb 14 oz)     IBW: 67kg  %IBW: 193%  UBW: 129kg  %Weight Change: stable  Body mass index is 43.49 kg/m².  BMI classification:  Obese Grade III (BMI >/= 40)      Nutrition Narrative:  : Pt stated appetite is good. Newly diagnosed with Diabetes. Educated pt and wife on diet. Pt will be scheduled with outpatient Diabetes Educator on discharge.     Monitoring and Evaluation:    Nutrition Monitoring and Evaluation:  diet order and food and nutrition knowledge skills    Nutrition Risk:  Level of Nutrition Risk:  Low  Frequency of Follow up:  Dietitian will f/up within 7 days.  "         Nutrition Education:    Patient educated on: Diabetic Diet.      Teaching Method:  Demonstration and Explanation    Patient's Understanding: Verbalizes understanding    Barriers to learning: None evident    Expected Compliance:  good    All questions were answered. Dietitian's contact information provided. Patient and wife are familiar with diet restrictions due to family history. Pt and wife are determined to change diet habits for better glycemic control.

## 2022-07-01 ENCOUNTER — OFFICE VISIT (OUTPATIENT)
Dept: FAMILY MEDICINE | Facility: CLINIC | Age: 57
End: 2022-07-01
Payer: COMMERCIAL

## 2022-07-01 VITALS
HEIGHT: 68 IN | HEART RATE: 71 BPM | OXYGEN SATURATION: 97 % | SYSTOLIC BLOOD PRESSURE: 115 MMHG | DIASTOLIC BLOOD PRESSURE: 83 MMHG | WEIGHT: 279 LBS | BODY MASS INDEX: 42.28 KG/M2 | TEMPERATURE: 98 F | RESPIRATION RATE: 20 BRPM

## 2022-07-01 DIAGNOSIS — E78.2 MIXED HYPERLIPIDEMIA: ICD-10-CM

## 2022-07-01 DIAGNOSIS — I42.9 CARDIOMYOPATHY, UNSPECIFIED TYPE: Chronic | ICD-10-CM

## 2022-07-01 DIAGNOSIS — E78.2 MIXED DIABETIC HYPERLIPIDEMIA ASSOCIATED WITH TYPE 2 DIABETES MELLITUS: ICD-10-CM

## 2022-07-01 DIAGNOSIS — E55.9 VITAMIN D DEFICIENCY: ICD-10-CM

## 2022-07-01 DIAGNOSIS — R79.89 LOW TESTOSTERONE: ICD-10-CM

## 2022-07-01 DIAGNOSIS — Z12.5 PROSTATE CANCER SCREENING: ICD-10-CM

## 2022-07-01 DIAGNOSIS — I10 PRIMARY HYPERTENSION: ICD-10-CM

## 2022-07-01 DIAGNOSIS — Z09 HOSPITAL DISCHARGE FOLLOW-UP: Primary | ICD-10-CM

## 2022-07-01 DIAGNOSIS — E11.69 MIXED DIABETIC HYPERLIPIDEMIA ASSOCIATED WITH TYPE 2 DIABETES MELLITUS: ICD-10-CM

## 2022-07-01 DIAGNOSIS — E66.01 MORBID OBESITY: ICD-10-CM

## 2022-07-01 DIAGNOSIS — Z79.4 TYPE 2 DIABETES MELLITUS WITH HYPERGLYCEMIA, WITH LONG-TERM CURRENT USE OF INSULIN: ICD-10-CM

## 2022-07-01 DIAGNOSIS — E11.65 TYPE 2 DIABETES MELLITUS WITH HYPERGLYCEMIA, WITH LONG-TERM CURRENT USE OF INSULIN: ICD-10-CM

## 2022-07-01 PROBLEM — Z23 IMMUNIZATION DUE: Status: ACTIVE | Noted: 2022-07-01

## 2022-07-01 PROBLEM — Z00.00 WELLNESS EXAMINATION: Status: ACTIVE | Noted: 2022-07-01

## 2022-07-01 PROBLEM — G43.109 MIGRAINE WITH AURA: Status: ACTIVE | Noted: 2022-07-01

## 2022-07-01 PROBLEM — Z86.39 HX OF NON ANEMIC VITAMIN B12 DEFICIENCY: Status: ACTIVE | Noted: 2022-07-01

## 2022-07-01 PROBLEM — R51.9 HEADACHE: Status: ACTIVE | Noted: 2022-07-01

## 2022-07-01 PROBLEM — J30.2 SEASONAL ALLERGIES: Status: ACTIVE | Noted: 2022-07-01

## 2022-07-01 PROBLEM — Z12.11 COLON CANCER SCREENING: Status: ACTIVE | Noted: 2022-07-01

## 2022-07-01 PROBLEM — E11.9 LONG-TERM INSULIN USE IN TYPE 2 DIABETES: Status: ACTIVE | Noted: 2022-07-01

## 2022-07-01 PROBLEM — J45.20 MILD INTERMITTENT ASTHMA: Status: ACTIVE | Noted: 2022-07-01

## 2022-07-01 PROBLEM — G44.53 THUNDERCLAP HEADACHE: Status: ACTIVE | Noted: 2022-07-01

## 2022-07-01 PROBLEM — Z80.0 FH: COLON CANCER IN RELATIVE DIAGNOSED AT >50 YEARS OLD: Status: ACTIVE | Noted: 2022-07-01

## 2022-07-01 PROCEDURE — 1160F PR REVIEW ALL MEDS BY PRESCRIBER/CLIN PHARMACIST DOCUMENTED: ICD-10-PCS | Mod: CPTII,,, | Performed by: FAMILY MEDICINE

## 2022-07-01 PROCEDURE — 1159F MED LIST DOCD IN RCRD: CPT | Mod: CPTII,,, | Performed by: FAMILY MEDICINE

## 2022-07-01 PROCEDURE — 3011F LIPID PANEL DOC REV: CPT | Mod: CPTII,,, | Performed by: FAMILY MEDICINE

## 2022-07-01 PROCEDURE — 3046F PR MOST RECENT HEMOGLOBIN A1C LEVEL > 9.0%: ICD-10-PCS | Mod: CPTII,,, | Performed by: FAMILY MEDICINE

## 2022-07-01 PROCEDURE — 3008F PR BODY MASS INDEX (BMI) DOCUMENTED: ICD-10-PCS | Mod: CPTII,,, | Performed by: FAMILY MEDICINE

## 2022-07-01 PROCEDURE — 99214 PR OFFICE/OUTPT VISIT, EST, LEVL IV, 30-39 MIN: ICD-10-PCS | Mod: ,,, | Performed by: FAMILY MEDICINE

## 2022-07-01 PROCEDURE — 4010F ACE/ARB THERAPY RXD/TAKEN: CPT | Mod: CPTII,,, | Performed by: FAMILY MEDICINE

## 2022-07-01 PROCEDURE — 1159F PR MEDICATION LIST DOCUMENTED IN MEDICAL RECORD: ICD-10-PCS | Mod: CPTII,,, | Performed by: FAMILY MEDICINE

## 2022-07-01 PROCEDURE — 3046F HEMOGLOBIN A1C LEVEL >9.0%: CPT | Mod: CPTII,,, | Performed by: FAMILY MEDICINE

## 2022-07-01 PROCEDURE — 3008F BODY MASS INDEX DOCD: CPT | Mod: CPTII,,, | Performed by: FAMILY MEDICINE

## 2022-07-01 PROCEDURE — 4010F PR ACE/ARB THEARPY RXD/TAKEN: ICD-10-PCS | Mod: CPTII,,, | Performed by: FAMILY MEDICINE

## 2022-07-01 PROCEDURE — 3011F PR LIPID PANEL RESULTS DOCUMENTED AND REVIEWED: ICD-10-PCS | Mod: CPTII,,, | Performed by: FAMILY MEDICINE

## 2022-07-01 PROCEDURE — 3074F PR MOST RECENT SYSTOLIC BLOOD PRESSURE < 130 MM HG: ICD-10-PCS | Mod: CPTII,,, | Performed by: FAMILY MEDICINE

## 2022-07-01 PROCEDURE — 3079F DIAST BP 80-89 MM HG: CPT | Mod: CPTII,,, | Performed by: FAMILY MEDICINE

## 2022-07-01 PROCEDURE — 3079F PR MOST RECENT DIASTOLIC BLOOD PRESSURE 80-89 MM HG: ICD-10-PCS | Mod: CPTII,,, | Performed by: FAMILY MEDICINE

## 2022-07-01 PROCEDURE — 3074F SYST BP LT 130 MM HG: CPT | Mod: CPTII,,, | Performed by: FAMILY MEDICINE

## 2022-07-01 PROCEDURE — 1160F RVW MEDS BY RX/DR IN RCRD: CPT | Mod: CPTII,,, | Performed by: FAMILY MEDICINE

## 2022-07-01 PROCEDURE — 99214 OFFICE O/P EST MOD 30 MIN: CPT | Mod: ,,, | Performed by: FAMILY MEDICINE

## 2022-07-01 RX ORDER — ERGOCALCIFEROL 1.25 MG/1
50000 CAPSULE ORAL
Qty: 4 CAPSULE | Refills: 5 | Status: SHIPPED | OUTPATIENT
Start: 2022-07-01 | End: 2022-10-04 | Stop reason: SDUPTHER

## 2022-07-01 RX ORDER — ATORVASTATIN CALCIUM 10 MG/1
10 TABLET, FILM COATED ORAL DAILY
Qty: 90 TABLET | Refills: 1 | Status: SHIPPED | OUTPATIENT
Start: 2022-07-01 | End: 2022-09-08 | Stop reason: SDUPTHER

## 2022-07-01 RX ORDER — DULAGLUTIDE 0.75 MG/.5ML
0.75 INJECTION, SOLUTION SUBCUTANEOUS
Qty: 4 PEN | Refills: 5 | Status: SHIPPED | OUTPATIENT
Start: 2022-07-01 | End: 2022-07-20 | Stop reason: SDUPTHER

## 2022-07-01 NOTE — PROGRESS NOTES
Subjective:      Patient ID: Chadwick Lorenzo is a 56 y.o. male.    Chief Complaint: Follow-up (Hospital admission 6/22-6/24/22. Newly dx diabetic. Will be following up with [facility] cardio for decreased EF and will need to be scheduled for angio (waiting on scheduling to contact him).  Also has some [facility] paperwork to address. )    Patient was recently admitted to hospital for new onset type 2 diab with glucose in the 500s at time of admission (A1c 10.6). Was also noted to have a cardiomyopathy that he now has cardiology follow-ups to pursue as well.     Was started on insulin during admission (c/w guidelines related to A1c>10.0), but has not yet failed oral therapy. Home glucose monitoring currently shows glucoses in the 70s-150s. Patient is feeling generally well, but has some eye complaints (blurry vision), though he is scheduled to see an eye doctor in the next week.        Problem List Items Addressed This Visit     Cardiomyopathy (Chronic)    Low testosterone    Relevant Orders    CBC Auto Differential    Testosterone    Mixed diabetic hyperlipidemia associated with type 2 diabetes mellitus    Relevant Medications    dulaglutide (TRULICITY) 0.75 mg/0.5 mL pen injector    atorvastatin (LIPITOR) 10 MG tablet    Other Relevant Orders    Comprehensive Metabolic Panel    Lipid Panel    Urinalysis, Reflex to Urine Culture Urine, Clean Catch    Hemoglobin A1C    MICROALBUMIN / CREATININE RATIO URINE    Ambulatory referral/consult to Diabetes Education    Type 2 diabetes mellitus with hyperglycemia, with long-term current use of insulin    Relevant Medications    dulaglutide (TRULICITY) 0.75 mg/0.5 mL pen injector    atorvastatin (LIPITOR) 10 MG tablet    Other Relevant Orders    Comprehensive Metabolic Panel    Lipid Panel    Urinalysis, Reflex to Urine Culture Urine, Clean Catch    Hemoglobin A1C    MICROALBUMIN / CREATININE RATIO URINE    Ambulatory referral/consult to Diabetes Education    Body mass index 40.0-44.9,  adult    Morbid obesity    Primary hypertension    Relevant Orders    CBC Auto Differential    Comprehensive Metabolic Panel    Lipid Panel    Urinalysis, Reflex to Urine Culture Urine, Clean Catch    Mixed hyperlipidemia    Relevant Medications    atorvastatin (LIPITOR) 10 MG tablet    Other Relevant Orders    Comprehensive Metabolic Panel    Lipid Panel    Vitamin D deficiency    Relevant Medications    ergocalciferol (ERGOCALCIFEROL) 50,000 unit Cap    Other Relevant Orders    Vitamin D      Other Visit Diagnoses     Hospital discharge follow-up    -  Primary    Prostate cancer screening        Relevant Orders    Urinalysis, Reflex to Urine Culture Urine, Clean Catch    PSA, Screening          The patient's Health Maintenance was reviewed and the following appears to be due:   Health Maintenance Due   Topic Date Due    Hepatitis C Screening  Never done    Diabetes Urine Screening  Never done    Pneumococcal Vaccines (Age 0-64) (1 - PCV) Never done    Foot Exam  Never done    HIV Screening  Never done    Colorectal Cancer Screening  Never done    Shingles Vaccine (1 of 2) Never done    COVID-19 Vaccine (4 - Booster for Pfizer series) 01/27/2022    Eye Exam  06/28/2022       Past Medical History:  Past Medical History:   Diagnosis Date    CHF (congestive heart failure)     Diabetes mellitus, type 2     IGT (impaired glucose tolerance)     Kidney stones     Low testosterone in male     Migraine     Morbid obesity     Vitamin D deficiency      Past Surgical History:   Procedure Laterality Date    kidney stones removal      rotator cuff surgery       Review of patient's allergies indicates:  No Known Allergies  Current Outpatient Medications on File Prior to Visit   Medication Sig Dispense Refill    albuterol (PROVENTIL/VENTOLIN HFA) 90 mcg/actuation inhaler Inhale 90 puffs into the lungs daily as needed.      blood sugar diagnostic Strp 1 strip by Misc.(Non-Drug; Combo Route) route 3 (three)  times daily before meals. 100 strip 0    blood-glucose meter Misc 1 Device by Misc.(Non-Drug; Combo Route) route 3 (three) times daily before meals. 1 each 0    insulin detemir U-100 (LEVEMIR FLEXTOUCH U-100 INSULN) 100 unit/mL (3 mL) InPn pen Inject 40 Units into the skin every evening. 12 mL 0    insulin regular human (HUMULIN R) 100 unit/mL (3 mL) InPn pen Inject 6 Units into the skin 3 (three) times daily before meals. 5.4 mL 0    lancets Misc 1 lancet by Misc.(Non-Drug; Combo Route) route 3 (three) times daily before meals. 100 each 11    lancing device Misc 1 Device by Misc.(Non-Drug; Combo Route) route 3 (three) times daily before meals. 1 each 0    metoprolol succinate (TOPROL-XL) 25 MG 24 hr tablet Take 1 tablet (25 mg total) by mouth once daily. 90 tablet 0    NURTEC 75 mg odt Take 75 mg by mouth daily as needed.      sacubitriL-valsartan (ENTRESTO) 24-26 mg per tablet Take 1 tablet by mouth 2 (two) times daily. 180 tablet 0    [DISCONTINUED] insulin aspart U-100 (NOVOLOG) 100 unit/mL injection Inject 6 Units into the skin 3 (three) times daily before meals. 10 mL 1     No current facility-administered medications on file prior to visit.     Social History     Socioeconomic History    Marital status:     Number of children: 2   Occupational History    Occupation: medical assistant    Occupation: Medical Assistant     Employer: [employer]     Comment: [employer]   Tobacco Use    Smoking status: Never Smoker    Smokeless tobacco: Never Used   Substance and Sexual Activity    Alcohol use: Not Currently    Drug use: Never     Family History   Problem Relation Age of Onset    Cancer Mother     Heart disease Father        Review of Systems   Eyes: Positive for visual disturbance.   Endocrine: Negative for polydipsia, polyphagia and polyuria.   All other systems reviewed and are negative.      Objective:   /83 (BP Location: Right arm, Patient Position: Sitting, BP Method: X-Large  "(Automatic))   Pulse 71   Temp 98.1 °F (36.7 °C) (Oral)   Resp 20   Ht 5' 8" (1.727 m)   Wt 126.6 kg (279 lb)   SpO2 97%   BMI 42.42 kg/m²     Physical Exam  Vitals and nursing note reviewed.   Constitutional:       Appearance: Normal appearance. He is obese.   HENT:      Head: Normocephalic and atraumatic.      Right Ear: Tympanic membrane, ear canal and external ear normal.      Left Ear: Tympanic membrane, ear canal and external ear normal.      Nose: Nose normal.      Mouth/Throat:      Mouth: Mucous membranes are moist.      Pharynx: Oropharynx is clear.   Eyes:      Extraocular Movements: Extraocular movements intact.      Conjunctiva/sclera: Conjunctivae normal.      Pupils: Pupils are equal, round, and reactive to light.   Cardiovascular:      Rate and Rhythm: Normal rate and regular rhythm.      Pulses: Normal pulses.           Dorsalis pedis pulses are 2+ on the right side and 2+ on the left side.        Posterior tibial pulses are 2+ on the right side and 2+ on the left side.      Heart sounds: Normal heart sounds.   Pulmonary:      Effort: Pulmonary effort is normal.      Breath sounds: Normal breath sounds.   Abdominal:      General: Abdomen is flat. Bowel sounds are normal.      Palpations: Abdomen is soft.   Musculoskeletal:         General: Normal range of motion.      Cervical back: Normal range of motion and neck supple.      Right foot: Normal range of motion. No deformity, bunion, Charcot foot, foot drop or prominent metatarsal heads.      Left foot: Normal range of motion. No deformity, bunion, Charcot foot, foot drop or prominent metatarsal heads.   Feet:      Right foot:      Protective Sensation: 10 sites tested. 10 sites sensed.      Skin integrity: Skin integrity normal.      Toenail Condition: Right toenails are normal.      Left foot:      Protective Sensation: 10 sites tested. 10 sites sensed.      Skin integrity: Skin integrity normal.      Toenail Condition: Left toenails are " normal.   Skin:     General: Skin is warm and dry.      Capillary Refill: Capillary refill takes 2 to 3 seconds.   Neurological:      General: No focal deficit present.      Mental Status: He is alert and oriented to person, place, and time. Mental status is at baseline.   Psychiatric:         Mood and Affect: Mood normal.         Behavior: Behavior normal.         Thought Content: Thought content normal.         Judgment: Judgment normal.         Admission on 06/22/2022, Discharged on 06/24/2022   Component Date Value Ref Range Status    Troponin-I 06/22/2022 <0.010  0.000 - 0.045 ng/mL Final    Sodium Level 06/22/2022 135 (A) 136 - 145 mmol/L Final    Potassium Level 06/22/2022 4.5  3.5 - 5.1 mmol/L Final    Chloride 06/22/2022 103  98 - 107 mmol/L Final    Carbon Dioxide 06/22/2022 24  22 - 29 mmol/L Final    Glucose Level 06/22/2022 544 (A) 74 - 100 mg/dL Final    Critical Result called and verified by verbal readback to: Sandra Kelley on 06/22/2022 at 19:29. Reported by [id number].    Blood Urea Nitrogen 06/22/2022 11.0  8.4 - 25.7 mg/dL Final    Creatinine 06/22/2022 1.20 (A) 0.73 - 1.18 mg/dL Final    Calcium Level Total 06/22/2022 9.6  8.4 - 10.2 mg/dL Final    Protein Total 06/22/2022 6.7  6.4 - 8.3 gm/dL Final    Albumin Level 06/22/2022 3.7  3.5 - 5.0 gm/dL Final    Globulin 06/22/2022 3.0  2.4 - 3.5 gm/dL Final    Albumin/Globulin Ratio 06/22/2022 1.2  1.1 - 2.0 ratio Final    Bilirubin Total 06/22/2022 0.4  <=1.5 mg/dL Final    Alkaline Phosphatase 06/22/2022 144  40 - 150 unit/L Final    Alanine Aminotransferase 06/22/2022 27  0 - 55 unit/L Final    Aspartate Aminotransferase 06/22/2022 18  5 - 34 unit/L Final    Estimated GFR- 06/22/2022 >60  mls/min/1.73/m2 Final    POCT Glucose 06/22/2022 >500 (A) 70 - 110 mg/dL Final    WBC 06/22/2022 6.6  4.5 - 11.5 x10(3)/mcL Final    RBC 06/22/2022 5.20  4.70 - 6.10 x10(6)/mcL Final    Hgb 06/22/2022 14.1  14.0 - 18.0 gm/dL  Final    Hct 06/22/2022 43.6  42.0 - 52.0 % Final    MCV 06/22/2022 83.8  80.0 - 94.0 fL Final    MCH 06/22/2022 27.1  27.0 - 31.0 pg Final    MCHC 06/22/2022 32.3 (A) 33.0 - 36.0 mg/dL Final    RDW 06/22/2022 13.2  11.5 - 17.0 % Final    Platelet 06/22/2022 365  130 - 400 x10(3)/mcL Final    MPV 06/22/2022 10.5  9.4 - 12.4 fL Final    Neut % 06/22/2022 30.4  % Final    Lymph % 06/22/2022 58.9  % Final    Mono % 06/22/2022 6.1  % Final    Eos % 06/22/2022 3.6  % Final    Basophil % 06/22/2022 0.8  % Final    Lymph # 06/22/2022 3.88  0.6 - 4.6 x10(3)/mcL Final    Neut # 06/22/2022 2.0 (A) 2.1 - 9.2 x10(3)/mcL Final    Mono # 06/22/2022 0.40  0.1 - 1.3 x10(3)/mcL Final    Eos # 06/22/2022 0.24  0 - 0.9 x10(3)/mcL Final    Baso # 06/22/2022 0.05  0 - 0.2 x10(3)/mcL Final    IG# 06/22/2022 0.01  0 - 0.0155 x10(3)/mcL Final    IG% 06/22/2022 0.2  0 - 0.43 % Final    NRBC% 06/22/2022 0.0  % Final    POC Glucose 06/22/2022 336 (A) 70 - 110 MG/DL Final    Troponin-I 06/23/2022 <0.010  0.000 - 0.045 ng/mL Final    BSA 06/23/2022 2.49  m2 Final    TDI SEPTAL 06/23/2022 0.07  m/s Final    LV LATERAL E/E' RATIO 06/23/2022 3.80  m/s Final    LV SEPTAL E/E' RATIO 06/23/2022 5.43  m/s Final    Right Atrial Pressure (from IVC) 06/23/2022 8  mmHg Final    EF 06/23/2022 25  % Final    Left Ventricular Outflow Tract Martine* 06/23/2022 0.531  cm/s Final    Left Ventricular Outflow Tract Martine* 06/23/2022 1.00  mmHg Final    TDI LATERAL 06/23/2022 0.10  m/s Final    PV PEAK VELOCITY 06/23/2022 1.08  cm/s Final    LVIDd 06/23/2022 4.22  3.5 - 6.0 cm Final    IVS 06/23/2022 1.24 (A) 0.6 - 1.1 cm Final    Posterior Wall 06/23/2022 1.64 (A) 0.6 - 1.1 cm Final    LVIDs 06/23/2022 2.81  2.1 - 4.0 cm Final    FS 06/23/2022 33  28 - 44 % Final    LV mass 06/23/2022 235.86  g Final    LA size 06/23/2022 3.30  cm Final    RVDD 06/23/2022 2.44  cm Final    TAPSE 06/23/2022 2.04  cm Final    Left Ventricle  Relative Wall Thick* 06/23/2022 0.78  cm Final    AV regurgitation pressure 1/2 time 06/23/2022 509  ms Final    AV mean gradient 06/23/2022 2  mmHg Final    AV valve area 06/23/2022 2.53  cm2 Final    AV Velocity Ratio 06/23/2022 0.79   Final    AV index (prosthetic) 06/23/2022 0.73   Final    MV mean gradient 06/23/2022 1  mmHg Final    MV valve area p 1/2 method 06/23/2022 2.86  cm2 Final    MV valve area by continuity eq 06/23/2022 3.00  cm2 Final    E/A ratio 06/23/2022 0.79   Final    Mean e' 06/23/2022 0.09  m/s Final    E wave deceleration time 06/23/2022 203  msec Final    LVOT diameter 06/23/2022 2.10  cm Final    LVOT area 06/23/2022 3.5  cm2 Final    LVOT peak hank 06/23/2022 0.83  m/s Final    LVOT peak VTI 06/23/2022 16.90  cm Final    Ao peak hank 06/23/2022 1.05  m/s Final    Ao VTI 06/23/2022 23.1  cm Final    LVOT stroke volume 06/23/2022 58.51  cm3 Final    AV peak gradient 06/23/2022 4  mmHg Final    MV peak gradient 06/23/2022 2  mmHg Final    TV rest pulmonary artery pressure 06/23/2022 16  mmHg Final    E/E' ratio 06/23/2022 4.47  m/s Final    MV Peak E Hank 06/23/2022 0.38  m/s Final    AR Max Hank 06/23/2022 1.74  m/s Final    TR Max Hank 06/23/2022 1.40  m/s Final    MV VTI 06/23/2022 19.5  cm Final    MV stenosis pressure 1/2 time 06/23/2022 77  ms Final    MV Peak A Hank 06/23/2022 0.48  m/s Final    LV Systolic Volume 06/23/2022 29.80  mL Final    LV Systolic Volume Index 06/23/2022 12.5  mL/m2 Final    LV Diastolic Volume 06/23/2022 79.50  mL Final    LV Diastolic Volume Index 06/23/2022 33.40  mL/m2 Final    LV Mass Index 06/23/2022 99  g/m2 Final    Triscuspid Valve Regurgitation Pea* 06/23/2022 8  mmHg Final    Cholesterol Total 06/22/2022 231 (A) <=200 mg/dL Final    HDL Cholesterol 06/22/2022 33 (A) 35 - 60 mg/dL Final    Triglyceride 06/22/2022 304 (A) 34 - 140 mg/dL Final    Cholesterol/HDL Ratio 06/22/2022 7 (A) 0 - 5 Final    Very Low Density  Lipoprotein 06/22/2022 61   Final    LDL Cholesterol 06/22/2022 137.00  50.00 - 140.00 mg/dL Final    Urine Protein Level 06/23/2022 <6.8  mg/dL Final    Urine Creatinine 06/23/2022 54.1 (A) 63.0 - 166.0 mg/dL Final    Urine Protein/Creatinine Ratio 06/23/2022 <125.7  <=200.0 mg/gm Cr Final    Troponin-I 06/23/2022 <0.010  0.000 - 0.045 ng/mL Final    Sodium Level 06/23/2022 137  136 - 145 mmol/L Final    Potassium Level 06/23/2022 4.5  3.5 - 5.1 mmol/L Final    Chloride 06/23/2022 108 (A) 98 - 107 mmol/L Final    Carbon Dioxide 06/23/2022 23  22 - 29 mmol/L Final    Glucose Level 06/23/2022 358 (A) 74 - 100 mg/dL Final    Blood Urea Nitrogen 06/23/2022 8.4  8.4 - 25.7 mg/dL Final    Creatinine 06/23/2022 0.87  0.73 - 1.18 mg/dL Final    BUN/Creatinine Ratio 06/23/2022 10   Final    Calcium Level Total 06/23/2022 9.2  8.4 - 10.2 mg/dL Final    Estimated GFR- 06/23/2022 >60  mls/min/1.73/m2 Final    Anion Gap 06/23/2022 6.0  mEq/L Final    Magnesium Level 06/23/2022 1.90  1.60 - 2.60 mg/dL Final    Phosphorus Level 06/23/2022 2.7  2.3 - 4.7 mg/dL Final    WBC 06/23/2022 5.6  4.5 - 11.5 x10(3)/mcL Final    RBC 06/23/2022 4.28 (A) 4.70 - 6.10 x10(6)/mcL Final    Hgb 06/23/2022 11.7 (A) 14.0 - 18.0 gm/dL Final    Hct 06/23/2022 36.9 (A) 42.0 - 52.0 % Final    MCV 06/23/2022 86.2  80.0 - 94.0 fL Final    MCH 06/23/2022 27.3  27.0 - 31.0 pg Final    MCHC 06/23/2022 31.7 (A) 33.0 - 36.0 mg/dL Final    RDW 06/23/2022 13.3  11.5 - 17.0 % Final    Platelet 06/23/2022 290  130 - 400 x10(3)/mcL Final    MPV 06/23/2022 10.2  9.4 - 12.4 fL Final    Neut % 06/23/2022 33.0  % Final    Lymph % 06/23/2022 55.3  % Final    Mono % 06/23/2022 7.9  % Final    Eos % 06/23/2022 3.2  % Final    Basophil % 06/23/2022 0.4  % Final    Lymph # 06/23/2022 3.07  0.6 - 4.6 x10(3)/mcL Final    Neut # 06/23/2022 1.8 (A) 2.1 - 9.2 x10(3)/mcL Final    Mono # 06/23/2022 0.44  0.1 - 1.3 x10(3)/mcL  Final    Eos # 06/23/2022 0.18  0 - 0.9 x10(3)/mcL Final    Baso # 06/23/2022 0.02  0 - 0.2 x10(3)/mcL Final    IG# 06/23/2022 0.01  0 - 0.0155 x10(3)/mcL Final    IG% 06/23/2022 0.2  0 - 0.43 % Final    NRBC% 06/23/2022 0.0  % Final    POCT Glucose 06/22/2022 336 (A) 70 - 110 mg/dL Final    Troponin-I 06/23/2022 <0.010  0.000 - 0.045 ng/mL Final    POCT Glucose 06/23/2022 220 (A) 70 - 110 mg/dL Final    POCT Glucose 06/23/2022 207 (A) 70 - 110 mg/dL Final    POCT Glucose 06/23/2022 256 (A) 70 - 110 mg/dL Final    Sodium Level 06/24/2022 136  136 - 145 mmol/L Final    Potassium Level 06/24/2022 4.2  3.5 - 5.1 mmol/L Final    Chloride 06/24/2022 106  98 - 107 mmol/L Final    Carbon Dioxide 06/24/2022 22  22 - 29 mmol/L Final    Glucose Level 06/24/2022 221 (A) 74 - 100 mg/dL Final    Blood Urea Nitrogen 06/24/2022 7.3 (A) 8.4 - 25.7 mg/dL Final    Creatinine 06/24/2022 0.74  0.73 - 1.18 mg/dL Final    Calcium Level Total 06/24/2022 9.2  8.4 - 10.2 mg/dL Final    Protein Total 06/24/2022 5.9 (A) 6.4 - 8.3 gm/dL Final    Albumin Level 06/24/2022 3.4 (A) 3.5 - 5.0 gm/dL Final    Globulin 06/24/2022 2.5  2.4 - 3.5 gm/dL Final    Albumin/Globulin Ratio 06/24/2022 1.4  1.1 - 2.0 ratio Final    Bilirubin Total 06/24/2022 0.6  <=1.5 mg/dL Final    Alkaline Phosphatase 06/24/2022 110  40 - 150 unit/L Final    Alanine Aminotransferase 06/24/2022 23  0 - 55 unit/L Final    Aspartate Aminotransferase 06/24/2022 17  5 - 34 unit/L Final    Estimated GFR- 06/24/2022 >60  mls/min/1.73/m2 Final    WBC 06/24/2022 5.0  4.5 - 11.5 x10(3)/mcL Final    RBC 06/24/2022 4.53 (A) 4.70 - 6.10 x10(6)/mcL Final    Hgb 06/24/2022 12.4 (A) 14.0 - 18.0 gm/dL Final    Hct 06/24/2022 39.3 (A) 42.0 - 52.0 % Final    MCV 06/24/2022 86.8  80.0 - 94.0 fL Final    MCH 06/24/2022 27.4  27.0 - 31.0 pg Final    MCHC 06/24/2022 31.6 (A) 33.0 - 36.0 mg/dL Final    RDW 06/24/2022 13.2  11.5 - 17.0 % Final     Platelet 06/24/2022 301  130 - 400 x10(3)/mcL Final    MPV 06/24/2022 10.8  9.4 - 12.4 fL Final    Neut % 06/24/2022 30.5  % Final    Lymph % 06/24/2022 57.9  % Final    Mono % 06/24/2022 5.6  % Final    Eos % 06/24/2022 5.2  % Final    Basophil % 06/24/2022 0.6  % Final    Lymph # 06/24/2022 2.92  0.6 - 4.6 x10(3)/mcL Final    Neut # 06/24/2022 1.5 (A) 2.1 - 9.2 x10(3)/mcL Final    Mono # 06/24/2022 0.28  0.1 - 1.3 x10(3)/mcL Final    Eos # 06/24/2022 0.26  0 - 0.9 x10(3)/mcL Final    Baso # 06/24/2022 0.03  0 - 0.2 x10(3)/mcL Final    IG# 06/24/2022 0.01  0 - 0.0155 x10(3)/mcL Final    IG% 06/24/2022 0.2  0 - 0.43 % Final    NRBC% 06/24/2022 0.0  % Final    Vit D 25 OH 06/24/2022 6.6 (A) 30.0 - 80.0 ng/mL Final    Magnesium Level 06/24/2022 1.70  1.60 - 2.60 mg/dL Final    POCT Glucose 06/24/2022 218 (A) 70 - 110 mg/dL Final    POCT Glucose 06/24/2022 171 (A) 70 - 110 mg/dL Final    POCT Glucose 06/23/2022 287 (A) 70 - 110 mg/dL Final    POCT Glucose 06/24/2022 124 (A) 70 - 110 mg/dL Final   Lab Visit on 06/22/2022   Component Date Value Ref Range Status    Sodium Level 06/22/2022 138  136 - 145 mmol/L Final    Potassium Level 06/22/2022 4.3  3.5 - 5.1 mmol/L Final    Chloride 06/22/2022 106  98 - 107 mmol/L Final    Carbon Dioxide 06/22/2022 21 (A) 22 - 29 mmol/L Final    Glucose Level 06/22/2022 486 (A) 74 - 100 mg/dL Final    Critical Result called and verified by verbal readback to: spencer kelley r.n. on 06/22/2022 at 15:53. Reported by [id number].    Blood Urea Nitrogen 06/22/2022 10.2  8.4 - 25.7 mg/dL Final    Creatinine 06/22/2022 1.04  0.73 - 1.18 mg/dL Final    Calcium Level Total 06/22/2022 9.7  8.4 - 10.2 mg/dL Final    Protein Total 06/22/2022 7.5  6.4 - 8.3 gm/dL Final    Albumin Level 06/22/2022 3.8  3.5 - 5.0 gm/dL Final    Globulin 06/22/2022 3.7 (A) 2.4 - 3.5 gm/dL Final    Albumin/Globulin Ratio 06/22/2022 1.0 (A) 1.1 - 2.0 ratio Final    Bilirubin Total 06/22/2022  0.4  <=1.5 mg/dL Final    Alkaline Phosphatase 06/22/2022 137  40 - 150 unit/L Final    Alanine Aminotransferase 06/22/2022 29  0 - 55 unit/L Final    Aspartate Aminotransferase 06/22/2022 20  5 - 34 unit/L Final    Estimated GFR- 06/22/2022 >60  mls/min/1.73/m2 Final    Thyroid Stimulating Hormone 06/22/2022 1.0344  0.3500 - 4.9400 uIU/mL Final    Thyroxine Free 06/22/2022 0.87  0.70 - 1.48 ng/dL Final    Hemoglobin A1c 06/22/2022 10.6 (A) <=7.0 % Final    Estimated Average Glucose 06/22/2022 257.5  mg/dL Final    Prostate Specific Antigen 06/22/2022 0.46  <=4.00 ng/mL Final    Testosterone Total 06/22/2022 88.12 (A) 220.91 - 715.81 ng/dL Final    WBC 06/22/2022 5.8  4.5 - 11.5 x10(3)/mcL Final    RBC 06/22/2022 5.06  4.70 - 6.10 x10(6)/mcL Final    Hgb 06/22/2022 14.2  14.0 - 18.0 gm/dL Final    Hct 06/22/2022 42.7  42.0 - 52.0 % Final    MCV 06/22/2022 84.4  80.0 - 94.0 fL Final    MCH 06/22/2022 28.1  27.0 - 31.0 pg Final    MCHC 06/22/2022 33.3  33.0 - 36.0 mg/dL Final    RDW 06/22/2022 13.1  11.5 - 17.0 % Final    Platelet 06/22/2022 354  130 - 400 x10(3)/mcL Final    MPV 06/22/2022 10.0  9.4 - 12.4 fL Final    Color, UA 06/22/2022 Yellow  Yellow, Colorless, Other, Clear Final    Appearance, UA 06/22/2022 Clear  Clear Final    Specific Gravity, UA 06/22/2022 1.010   Final    pH, UA 06/22/2022 6.0  5.0, 5.5, 6.0, 6.5, 7.0, 7.5, 8.0, 8.5 Final    Protein, UA 06/22/2022 Negative  Negative, 300  mg/dL Final    Glucose, UA 06/22/2022 >=1000 (A) Negative, Normal mg/dL Final    Ketones, UA 06/22/2022 Negative  Negative, +1, +2, +3, +4, +5, >=160, >=80 mg/dL Final    Blood, UA 06/22/2022 Negative  Negative unit/L Final    Bilirubin, UA 06/22/2022 Negative  Negative mg/dL Final    Urobilinogen, UA 06/22/2022 0.2  0.2, 1.0, Normal mg/dL Final    Nitrites, UA 06/22/2022 Negative  Negative Final    Leukocyte Esterase, UA 06/22/2022 Negative  Negative, 75  unit/L Final     Neut Man 06/22/2022 33 (A) 47 - 80 % Final    Lymph Man 06/22/2022 53 (A) 13 - 40 % Final    Monocyte Man 06/22/2022 9  2 - 11 % Final    Eos Man 06/22/2022 5  0 - 8 % Final    Abs Neut calc 06/22/2022 1.914 (A) 2.1 - 9.2 x10(3)/mcL Final    Abs Mono 06/22/2022 0.522  0.1 - 1.3 x10(3)/mcL Final    Abs Lymp 06/22/2022 3.074 (A) 0.6 - 4.6 x10(3)/mcL Final    Abs Eos  06/22/2022 0.29  0 - 0.9 x10(3)/mcL Final    Platelet Est 06/22/2022 Adequate  Normal, Adequate Final     Lab Results   Component Value Date    HGBA1C 10.6 (H) 06/22/2022         Echo  Addendum: · Concentric hypertrophy and severely decreased systolic function.   · The estimated ejection fraction is 25%.   · Grade I left ventricular diastolic dysfunction.   · Mild aortic regurgitation.   · Normal right ventricular size with normal right ventricular systolic  function.   · Intermediate central venous pressure (8 mmHg).   · The estimated PA systolic pressure is 16 mmHg.   · There is severe left ventricular global hypokinesis.  Narrative: · Concentric hypertrophy and severely decreased systolic function.  · The estimated ejection fraction is 45-50%.  · Grade I left ventricular diastolic dysfunction.  · Mild aortic regurgitation.  · Normal right ventricular size with normal right ventricular systolic   function.  · Intermediate central venous pressure (8 mmHg).  · The estimated PA systolic pressure is 16 mmHg.  · There is severe left ventricular global hypokinesis.          Assessment:     1. Hospital discharge follow-up    2. Type 2 diabetes mellitus with hyperglycemia, with long-term current use of insulin    3. Mixed diabetic hyperlipidemia associated with type 2 diabetes mellitus    4. Cardiomyopathy, unspecified type    5. Primary hypertension    6. Mixed hyperlipidemia    7. Morbid obesity    8. Body mass index 40.0-44.9, adult    9. Vitamin D deficiency    10. Prostate cancer screening    11. Low testosterone      Plan:   I am having Chadwick  Maura start on TRULICITY, ergocalciferol, and atorvastatin. I am also having him maintain his albuterol, NURTEC, LEVEMIR FLEXTOUCH U-100 INSULN, ENTRESTO, metoprolol succinate, lancing device, lancets, blood sugar diagnostic, insulin regular human, and blood-glucose meter.  Problem List Items Addressed This Visit     Cardiomyopathy (Chronic)    Low testosterone    Relevant Orders    CBC Auto Differential    Testosterone    Mixed diabetic hyperlipidemia associated with type 2 diabetes mellitus    Relevant Medications    dulaglutide (TRULICITY) 0.75 mg/0.5 mL pen injector    atorvastatin (LIPITOR) 10 MG tablet    Other Relevant Orders    Comprehensive Metabolic Panel    Lipid Panel    Urinalysis, Reflex to Urine Culture Urine, Clean Catch    Hemoglobin A1C    MICROALBUMIN / CREATININE RATIO URINE    Ambulatory referral/consult to Diabetes Education    Type 2 diabetes mellitus with hyperglycemia, with long-term current use of insulin    Relevant Medications    dulaglutide (TRULICITY) 0.75 mg/0.5 mL pen injector    atorvastatin (LIPITOR) 10 MG tablet    Other Relevant Orders    Comprehensive Metabolic Panel    Lipid Panel    Urinalysis, Reflex to Urine Culture Urine, Clean Catch    Hemoglobin A1C    MICROALBUMIN / CREATININE RATIO URINE    Ambulatory referral/consult to Diabetes Education    Body mass index 40.0-44.9, adult    Morbid obesity    Primary hypertension    Relevant Orders    CBC Auto Differential    Comprehensive Metabolic Panel    Lipid Panel    Urinalysis, Reflex to Urine Culture Urine, Clean Catch    Mixed hyperlipidemia    Relevant Medications    atorvastatin (LIPITOR) 10 MG tablet    Other Relevant Orders    Comprehensive Metabolic Panel    Lipid Panel    Vitamin D deficiency    Relevant Medications    ergocalciferol (ERGOCALCIFEROL) 50,000 unit Cap    Other Relevant Orders    Vitamin D      Other Visit Diagnoses     Hospital discharge follow-up    -  Primary    Prostate cancer screening         Relevant Orders    Urinalysis, Reflex to Urine Culture Urine, Clean Catch    PSA, Screening        Follow up in about 3 months (around 10/1/2022) for close gaps: colon cancer screen.    Chadwick was seen today for follow-up.    Diagnoses and all orders for this visit:    Hospital follow-up: today is day 7 from [facility]      Type 2 diabetes mellitus with hyperglycemia, with long-term current use of insulin  -     dulaglutide (TRULICITY) 0.75 mg/0.5 mL pen injector; Inject 0.75 mg into the skin every 7 days.  -     Comprehensive Metabolic Panel; Future  -     Lipid Panel; Future  -     Urinalysis, Reflex to Urine Culture Urine, Clean Catch; Future  -     Hemoglobin A1C; Future  -     MICROALBUMIN / CREATININE RATIO URINE; Future  -     Ambulatory referral/consult to Diabetes Education; Future  - Anticipate improvement with added trulicity, and anticipate initiating metformin as he did not fail oral therapy yet to justify exclusive reliance on exogenous insulin. Patient would also like to decrease and even cease use of insulin if possible, which I think can work, but we must continue this current course for now.    Mixed diabetic hyperlipidemia associated with type 2 diabetes mellitus  -     Comprehensive Metabolic Panel; Future  -     Lipid Panel; Future  -     Urinalysis, Reflex to Urine Culture Urine, Clean Catch; Future  -     Hemoglobin A1C; Future  -     MICROALBUMIN / CREATININE RATIO URINE; Future  -     Ambulatory referral/consult to Diabetes Education; Future  - Anticipate improvement with added trulicity, and anticipate initiating metformin as he did not fail oral therapy yet to justify exclusive reliance on exogenous insulin. Patient would also like to decrease and even cease use of insulin if possible, which I think can work, but we must continue this current course for now.    Cardiomyopathy, unspecified type   Uncertain total picture   Has cardiology managing this at this time   RTC 3 months to reassess and  will review cardiology-provided info then    Primary hypertension  -     CBC Auto Differential; Future  -     Comprehensive Metabolic Panel; Future  -     Lipid Panel; Future  -     Urinalysis, Reflex to Urine Culture Urine, Clean Catch; Future  - Controlled at this time.     Mixed hyperlipidemia  -     Comprehensive Metabolic Panel; Future  -     Lipid Panel; Future  - Rx for atorvastatin 10mg po q daily today, reassess with labs as ordered prior to next visit    Morbid obesity  Body mass index 40.0-44.9, adult  Documented for chart completeness and HCC    Vitamin D deficiency  -     Vitamin D; Future  -     ergocalciferol (ERGOCALCIFEROL) 50,000 unit Cap; Take 1 capsule (50,000 Units total) by mouth every 7 days.  - Low noted on admission labs, Rx for supplement, reassess in 3 months with surveillance labs    Prostate cancer screening  -     Urinalysis, Reflex to Urine Culture Urine, Clean Catch; Future  -     PSA, Screening; Future  - Future test for future visit    Low testosterone  -     CBC Auto Differential; Future  -     Testosterone; Future  - Agrees to settle DMII first, then pursue low T with secondary/follow-up labs in 3 months, likely followed by Rx for TRT.      Medications Ordered This Encounter   Medications    atorvastatin (LIPITOR) 10 MG tablet     Sig: Take 1 tablet (10 mg total) by mouth once daily.     Dispense:  90 tablet     Refill:  1    dulaglutide (TRULICITY) 0.75 mg/0.5 mL pen injector     Sig: Inject 0.75 mg into the skin every 7 days.     Dispense:  4 pen     Refill:  5    ergocalciferol (ERGOCALCIFEROL) 50,000 unit Cap     Sig: Take 1 capsule (50,000 Units total) by mouth every 7 days.     Dispense:  4 capsule     Refill:  5     [unfilled]  Orders Placed This Encounter   Procedures    CBC Auto Differential     Standing Status:   Future     Standing Expiration Date:   11/1/2022    Comprehensive Metabolic Panel     Standing Status:   Future     Standing Expiration Date:   11/1/2022     Lipid Panel     Standing Status:   Future     Standing Expiration Date:   11/1/2022    Urinalysis, Reflex to Urine Culture Urine, Clean Catch     Standing Status:   Future     Standing Expiration Date:   11/1/2022     Order Specific Question:   Preferred Collection Type     Answer:   Urine, Clean Catch     Order Specific Question:   Specimen Source     Answer:   Urine    Hemoglobin A1C     Standing Status:   Future     Standing Expiration Date:   11/1/2022    MICROALBUMIN / CREATININE RATIO URINE     Standing Status:   Future     Standing Expiration Date:   11/1/2022     Order Specific Question:   Specimen Source     Answer:   Urine    Vitamin D     Standing Status:   Future     Standing Expiration Date:   11/1/2022    PSA, Screening     Standing Status:   Future     Standing Expiration Date:   11/1/2022    Testosterone     Standing Status:   Future     Standing Expiration Date:   11/1/2022    Ambulatory referral/consult to Diabetes Education     Standing Status:   Future     Standing Expiration Date:   7/1/2023     Referral Priority:   Routine     Referral Type:   Consultation     Referral Reason:   Specialty Services Required     Requested Specialty:   Diabetes     Number of Visits Requested:   1     Expiration Date:   7/1/2023       Medication List with Changes/Refills   New Medications    ATORVASTATIN (LIPITOR) 10 MG TABLET    Take 1 tablet (10 mg total) by mouth once daily.    DULAGLUTIDE (TRULICITY) 0.75 MG/0.5 ML PEN INJECTOR    Inject 0.75 mg into the skin every 7 days.    ERGOCALCIFEROL (ERGOCALCIFEROL) 50,000 UNIT CAP    Take 1 capsule (50,000 Units total) by mouth every 7 days.   Current Medications    ALBUTEROL (PROVENTIL/VENTOLIN HFA) 90 MCG/ACTUATION INHALER    Inhale 90 puffs into the lungs daily as needed.    BLOOD SUGAR DIAGNOSTIC STRP    1 strip by Misc.(Non-Drug; Combo Route) route 3 (three) times daily before meals.    BLOOD-GLUCOSE METER MISC    1 Device by Misc.(Non-Drug; Combo  Route) route 3 (three) times daily before meals.    INSULIN DETEMIR U-100 (LEVEMIR FLEXTOUCH U-100 INSULN) 100 UNIT/ML (3 ML) INPN PEN    Inject 40 Units into the skin every evening.    INSULIN REGULAR HUMAN (HUMULIN R) 100 UNIT/ML (3 ML) INPN PEN    Inject 6 Units into the skin 3 (three) times daily before meals.    LANCETS MISC    1 lancet by Misc.(Non-Drug; Combo Route) route 3 (three) times daily before meals.    LANCING DEVICE MISC    1 Device by Misc.(Non-Drug; Combo Route) route 3 (three) times daily before meals.    METOPROLOL SUCCINATE (TOPROL-XL) 25 MG 24 HR TABLET    Take 1 tablet (25 mg total) by mouth once daily.    NURTEC 75 MG ODT    Take 75 mg by mouth daily as needed.    SACUBITRIL-VALSARTAN (ENTRESTO) 24-26 MG PER TABLET    Take 1 tablet by mouth 2 (two) times daily.      Medication List with Changes/Refills   New Medications    ATORVASTATIN (LIPITOR) 10 MG TABLET    Take 1 tablet (10 mg total) by mouth once daily.       Start Date: 7/1/2022  End Date: 12/28/2022    DULAGLUTIDE (TRULICITY) 0.75 MG/0.5 ML PEN INJECTOR    Inject 0.75 mg into the skin every 7 days.       Start Date: 7/1/2022  End Date: 12/28/2022    ERGOCALCIFEROL (ERGOCALCIFEROL) 50,000 UNIT CAP    Take 1 capsule (50,000 Units total) by mouth every 7 days.       Start Date: 7/1/2022  End Date: 12/28/2022   Current Medications    ALBUTEROL (PROVENTIL/VENTOLIN HFA) 90 MCG/ACTUATION INHALER    Inhale 90 puffs into the lungs daily as needed.       Start Date: 2/3/2022  End Date: --    BLOOD SUGAR DIAGNOSTIC STRP    1 strip by Misc.(Non-Drug; Combo Route) route 3 (three) times daily before meals.       Start Date: 6/24/2022 End Date: --    BLOOD-GLUCOSE METER MISC    1 Device by Misc.(Non-Drug; Combo Route) route 3 (three) times daily before meals.       Start Date: 6/24/2022 End Date: --    INSULIN DETEMIR U-100 (LEVEMIR FLEXTOUCH U-100 INSULN) 100 UNIT/ML (3 ML) INPN PEN    Inject 40 Units into the skin every evening.       Start Date:  6/24/2022 End Date: 7/24/2022    INSULIN REGULAR HUMAN (HUMULIN R) 100 UNIT/ML (3 ML) INPN PEN    Inject 6 Units into the skin 3 (three) times daily before meals.       Start Date: 6/24/2022 End Date: --    LANCETS MISC    1 lancet by Misc.(Non-Drug; Combo Route) route 3 (three) times daily before meals.       Start Date: 6/24/2022 End Date: --    LANCING DEVICE MISC    1 Device by Misc.(Non-Drug; Combo Route) route 3 (three) times daily before meals.       Start Date: 6/24/2022 End Date: --    METOPROLOL SUCCINATE (TOPROL-XL) 25 MG 24 HR TABLET    Take 1 tablet (25 mg total) by mouth once daily.       Start Date: 6/24/2022 End Date: --    NURTEC 75 MG ODT    Take 75 mg by mouth daily as needed.       Start Date: 1/14/2022 End Date: --    SACUBITRIL-VALSARTAN (ENTRESTO) 24-26 MG PER TABLET    Take 1 tablet by mouth 2 (two) times daily.       Start Date: 6/24/2022 End Date: 9/22/2022         Transitional Care Note    Family and/or Caretaker present at visit?  No.  Diagnostic tests reviewed/disposition: I have reviewed all completed as well as pending diagnostic tests at the time of discharge.  Disease/illness education: Diab education  Home health/community services discussion/referrals: Patient does not have home health established from hospital visit.  They do not need home health.  If needed, we will set up home health for the patient.   Establishment or re-establishment of referral orders for community resources: No other necessary community resources.   Discussion with other health care providers: No discussion with other health care providers necessary.

## 2022-07-06 ENCOUNTER — CLINICAL SUPPORT (OUTPATIENT)
Dept: DIABETES | Facility: CLINIC | Age: 57
End: 2022-07-06
Payer: COMMERCIAL

## 2022-07-06 VITALS — HEIGHT: 68 IN | BODY MASS INDEX: 42.83 KG/M2 | WEIGHT: 282.63 LBS

## 2022-07-06 DIAGNOSIS — E11.65 TYPE 2 DIABETES MELLITUS WITH HYPERGLYCEMIA, UNSPECIFIED WHETHER LONG TERM INSULIN USE: ICD-10-CM

## 2022-07-06 PROCEDURE — G0108 DIAB MANAGE TRN  PER INDIV: HCPCS | Mod: ,,, | Performed by: INTERNAL MEDICINE

## 2022-07-06 PROCEDURE — G0108 PR DIAB MANAGE TRN  PER INDIV: ICD-10-PCS | Mod: ,,, | Performed by: INTERNAL MEDICINE

## 2022-07-12 ENCOUNTER — OFFICE VISIT (OUTPATIENT)
Dept: CARDIOLOGY | Facility: CLINIC | Age: 57
End: 2022-07-12
Payer: COMMERCIAL

## 2022-07-12 VITALS
RESPIRATION RATE: 20 BRPM | DIASTOLIC BLOOD PRESSURE: 67 MMHG | SYSTOLIC BLOOD PRESSURE: 100 MMHG | WEIGHT: 282.63 LBS | HEIGHT: 68 IN | HEART RATE: 68 BPM | TEMPERATURE: 98 F | BODY MASS INDEX: 42.83 KG/M2 | OXYGEN SATURATION: 98 %

## 2022-07-12 DIAGNOSIS — E78.2 MIXED HYPERLIPIDEMIA: ICD-10-CM

## 2022-07-12 DIAGNOSIS — I20.89 ANGINA OF EFFORT: ICD-10-CM

## 2022-07-12 DIAGNOSIS — I42.9 CARDIOMYOPATHY, UNSPECIFIED TYPE: Primary | Chronic | ICD-10-CM

## 2022-07-12 DIAGNOSIS — Z79.4 TYPE 2 DIABETES MELLITUS WITH HYPERGLYCEMIA, WITH LONG-TERM CURRENT USE OF INSULIN: ICD-10-CM

## 2022-07-12 DIAGNOSIS — E11.65 TYPE 2 DIABETES MELLITUS WITH HYPERGLYCEMIA, WITH LONG-TERM CURRENT USE OF INSULIN: ICD-10-CM

## 2022-07-12 DIAGNOSIS — R42 DIZZINESS: ICD-10-CM

## 2022-07-12 DIAGNOSIS — R07.9 CHEST PAIN, UNSPECIFIED TYPE: ICD-10-CM

## 2022-07-12 PROBLEM — I50.32 CHRONIC DIASTOLIC HEART FAILURE: Status: ACTIVE | Noted: 2022-07-12

## 2022-07-12 PROCEDURE — 99214 OFFICE O/P EST MOD 30 MIN: CPT | Mod: PBBFAC | Performed by: INTERNAL MEDICINE

## 2022-07-12 RX ORDER — SODIUM CHLORIDE 0.9 % (FLUSH) 0.9 %
10 SYRINGE (ML) INJECTION
Status: SHIPPED | OUTPATIENT
Start: 2022-07-12

## 2022-07-12 RX ORDER — SODIUM CHLORIDE 9 MG/ML
INJECTION, SOLUTION INTRAVENOUS ONCE
Status: CANCELLED | OUTPATIENT
Start: 2022-07-12 | End: 2022-07-12

## 2022-07-12 NOTE — PROGRESS NOTES
SUBJECTIVE:      Chief Complaint: initial visit s/p hospital d/c for ischemic workup       HPI: Chadwick Lorenzo is a 56 y.o. male for outpatient ischemic evaluation after being discharged from the hospital 6/24. Pt was diagnosed with NIIDDM, cardiomyopathy with LV EF 25%. During the visit he was experiencing some chest heaviness/pain without radiation and blood glucose in the 500s.     Today he is reporting that he has gotten short of breath drying off getting out of the shower and after doing some light yardwork since he has been discharged 6/24. But other times he has been able to mow the lawn and weedeat without sx.  He states his dizziness and chest heaviness has improved.  Pt states he was walking 30 minutes once a day  Before his recent hospitalization, but has stopped until he saw the cardiologist. Denies orthopnea and PND. States he does snore at night.  Pt denies any CP, abd pain, orthopnea, PND, leg swelling, PU, and all other sx at this time. No further complaints or concerns at this time.      Cardiac History:    Echo done 6/23/22  · Concentric hypertrophy and severely decreased systolic function.  · The estimated ejection fraction is 25%.  · Grade I left ventricular diastolic dysfunction.  · Mild aortic regurgitation.  · Normal right ventricular size with normal right ventricular systolic function.  · Intermediate central venous pressure (8 mmHg).  · The estimated PA systolic pressure is 16 mmHg.  · There is severe left ventricular global hypokinesis.          EKG done 6/23  Normal sinus rhythm, rate 60. Normal ECG       Past Medical History:   has a past medical history of CHF (congestive heart failure), Diabetes mellitus, type 2, IGT (impaired glucose tolerance), Kidney stones, Low testosterone in male, Migraine, Morbid obesity, and Vitamin D deficiency. HLD    Past Surgical History:   has a past surgical history that includes rotator cuff surgery and kidney stones removal.     Family  History:  family history includes Cancer in his mother; Heart disease in his father.     Social History:   reports that he has never smoked. He has never used smokeless tobacco. He reports previous alcohol use. He reports that he does not use drugs.     Allergies:  has No Known Allergies.     Home Medications:  Current Outpatient Medications   Medication Instructions    albuterol (PROVENTIL/VENTOLIN HFA) 90 mcg/actuation inhaler 90 puffs, Inhalation, Daily PRN    atorvastatin (LIPITOR) 10 mg, Oral, Daily    blood sugar diagnostic Strp 1 strip, Misc.(Non-Drug; Combo Route), 3 times daily before meals    blood-glucose meter Misc 1 Device, Misc.(Non-Drug; Combo Route), 3 times daily before meals    ergocalciferol (ERGOCALCIFEROL) 50,000 Units, Oral, Every 7 days    insulin regular human (HUMULIN R) 6 Units, Subcutaneous, 3 times daily before meals    lancets Misc 1 lancet, Misc.(Non-Drug; Combo Route), 3 times daily before meals    lancing device Misc 1 Device, Misc.(Non-Drug; Combo Route), 3 times daily before meals    LEVEMIR FLEXTOUCH U-100 INSULN 40 Units, Subcutaneous, Nightly    metoprolol succinate (TOPROL-XL) 25 mg, Oral, Daily    NURTEC 75 mg, Oral, Daily PRN    sacubitriL-valsartan (ENTRESTO) 24-26 mg per tablet 1 tablet, Oral, 2 times daily    TRULICITY 0.75 mg, Subcutaneous, Every 7 days        ROS:  Review of Systems   Constitutional: Negative for chills and fever.   Respiratory: Positive for shortness of breath (on exertion). Negative for cough.    Cardiovascular: Negative for chest pain, palpitations and leg swelling.   Gastrointestinal: Negative for abdominal pain, diarrhea and vomiting.   Endocrine: Negative for polyuria.   Genitourinary: Negative for difficulty urinating and hematuria.   Skin: Negative for rash.   Neurological: Negative for dizziness and light-headedness.   Hematological: Negative for adenopathy. Does not bruise/bleed easily.       OBJECTIVE:     Vital signs:   /67  "(BP Location: Right arm, BP Method: Large (Automatic))   Pulse 68   Temp 97.8 °F (36.6 °C) (Other (see comments))   Resp 20   Ht 5' 8.11" (1.73 m)   Wt 128.2 kg (282 lb 10.1 oz)   SpO2 98%   BMI 42.84 kg/m²      Physical Examination:  Physical Exam  Vital signs and nursing notes reviewed.  Constitutional: Patient is in no apparent distress. Awake and alert. Well-developed and well-nourished.  Head: Atraumatic. Normocephalic.  Eyes:  Conjunctivae nl. No scleral icterus.  ENT: Mucous membranes are moist. Oropharynx is clear.  Neck: Supple. Full ROM. No lymphadenopathy. No carotid bruits.  Cardiovascular: Regular rate and rhythm. No murmurs, rubs, or gallops. Distal pulses are 2+ and symmetric .  Pulmonary/Chest: No respiratory distress. Clear to auscultation bilaterally. No wheezing, rales, or rhonchi.  Abdominal: Soft. Non-distended. No TTP. No rebound, guarding, or rigidity.   Musculoskeletal: Moves all extremities. No edema. No calf tenderness.  Skin: Warm and dry.  Neurological: Awake and alert. No acute focal neurological deficits are appreciated.          ASSESSMENT & PLAN:        Angina, cardiomyopathy  Pt with recent hx of new onset heart failure. Discussed need for angio to rule out ischemic process. Risks and benefits also discussed. Pt expressed understanding and agrees to schedule the procedure.     DM, HLD  Pt to continue metoprolol, entresto and DM medications at home. Pt planning to f/u with PCP.        Gabe Osei DO  [facility], [facility]    "

## 2022-07-12 NOTE — H&P (VIEW-ONLY)
SUBJECTIVE:      Chief Complaint: initial visit s/p hospital d/c for ischemic workup       HPI: Chadwick Lorenzo is a 56 y.o. male for outpatient ischemic evaluation after being discharged from the hospital 6/24. Pt was diagnosed with NIIDDM, cardiomyopathy with LV EF 25%. During the visit he was experiencing some chest heaviness/pain without radiation and blood glucose in the 500s.     Today he is reporting that he has gotten short of breath drying off getting out of the shower and after doing some light yardwork since he has been discharged 6/24. But other times he has been able to mow the lawn and weedeat without sx.  He states his dizziness and chest heaviness has improved.  Pt states he was walking 30 minutes once a day  Before his recent hospitalization, but has stopped until he saw the cardiologist. Denies orthopnea and PND. States he does snore at night.  Pt denies any CP, abd pain, orthopnea, PND, leg swelling, PU, and all other sx at this time. No further complaints or concerns at this time.      Cardiac History:    Echo done 6/23/22  · Concentric hypertrophy and severely decreased systolic function.  · The estimated ejection fraction is 25%.  · Grade I left ventricular diastolic dysfunction.  · Mild aortic regurgitation.  · Normal right ventricular size with normal right ventricular systolic function.  · Intermediate central venous pressure (8 mmHg).  · The estimated PA systolic pressure is 16 mmHg.  · There is severe left ventricular global hypokinesis.          EKG done 6/23  Normal sinus rhythm, rate 60. Normal ECG       Past Medical History:   has a past medical history of CHF (congestive heart failure), Diabetes mellitus, type 2, IGT (impaired glucose tolerance), Kidney stones, Low testosterone in male, Migraine, Morbid obesity, and Vitamin D deficiency. HLD    Past Surgical History:   has a past surgical history that includes rotator cuff surgery and kidney stones removal.     Family  History:  family history includes Cancer in his mother; Heart disease in his father.     Social History:   reports that he has never smoked. He has never used smokeless tobacco. He reports previous alcohol use. He reports that he does not use drugs.     Allergies:  has No Known Allergies.     Home Medications:  Current Outpatient Medications   Medication Instructions    albuterol (PROVENTIL/VENTOLIN HFA) 90 mcg/actuation inhaler 90 puffs, Inhalation, Daily PRN    atorvastatin (LIPITOR) 10 mg, Oral, Daily    blood sugar diagnostic Strp 1 strip, Misc.(Non-Drug; Combo Route), 3 times daily before meals    blood-glucose meter Misc 1 Device, Misc.(Non-Drug; Combo Route), 3 times daily before meals    ergocalciferol (ERGOCALCIFEROL) 50,000 Units, Oral, Every 7 days    insulin regular human (HUMULIN R) 6 Units, Subcutaneous, 3 times daily before meals    lancets Misc 1 lancet, Misc.(Non-Drug; Combo Route), 3 times daily before meals    lancing device Misc 1 Device, Misc.(Non-Drug; Combo Route), 3 times daily before meals    LEVEMIR FLEXTOUCH U-100 INSULN 40 Units, Subcutaneous, Nightly    metoprolol succinate (TOPROL-XL) 25 mg, Oral, Daily    NURTEC 75 mg, Oral, Daily PRN    sacubitriL-valsartan (ENTRESTO) 24-26 mg per tablet 1 tablet, Oral, 2 times daily    TRULICITY 0.75 mg, Subcutaneous, Every 7 days        ROS:  Review of Systems   Constitutional: Negative for chills and fever.   Respiratory: Positive for shortness of breath (on exertion). Negative for cough.    Cardiovascular: Negative for chest pain, palpitations and leg swelling.   Gastrointestinal: Negative for abdominal pain, diarrhea and vomiting.   Endocrine: Negative for polyuria.   Genitourinary: Negative for difficulty urinating and hematuria.   Skin: Negative for rash.   Neurological: Negative for dizziness and light-headedness.   Hematological: Negative for adenopathy. Does not bruise/bleed easily.       OBJECTIVE:     Vital signs:   /67  "(BP Location: Right arm, BP Method: Large (Automatic))   Pulse 68   Temp 97.8 °F (36.6 °C) (Other (see comments))   Resp 20   Ht 5' 8.11" (1.73 m)   Wt 128.2 kg (282 lb 10.1 oz)   SpO2 98%   BMI 42.84 kg/m²      Physical Examination:  Physical Exam  Vital signs and nursing notes reviewed.  Constitutional: Patient is in no apparent distress. Awake and alert. Well-developed and well-nourished.  Head: Atraumatic. Normocephalic.  Eyes:  Conjunctivae nl. No scleral icterus.  ENT: Mucous membranes are moist. Oropharynx is clear.  Neck: Supple. Full ROM. No lymphadenopathy. No carotid bruits.  Cardiovascular: Regular rate and rhythm. No murmurs, rubs, or gallops. Distal pulses are 2+ and symmetric .  Pulmonary/Chest: No respiratory distress. Clear to auscultation bilaterally. No wheezing, rales, or rhonchi.  Abdominal: Soft. Non-distended. No TTP. No rebound, guarding, or rigidity.   Musculoskeletal: Moves all extremities. No edema. No calf tenderness.  Skin: Warm and dry.  Neurological: Awake and alert. No acute focal neurological deficits are appreciated.          ASSESSMENT & PLAN:        Angina, cardiomyopathy  Pt with recent hx of new onset heart failure. Discussed need for angio to rule out ischemic process. Risks and benefits also discussed. Pt expressed understanding and agrees to schedule the procedure.     DM, HLD  Pt to continue metoprolol, entresto and DM medications at home. Pt planning to f/u with PCP.        Gaeb Osei DO  [facility], [facility]    "

## 2022-07-20 ENCOUNTER — TELEPHONE (OUTPATIENT)
Dept: FAMILY MEDICINE | Facility: CLINIC | Age: 57
End: 2022-07-20
Payer: COMMERCIAL

## 2022-07-20 DIAGNOSIS — E11.65 TYPE 2 DIABETES MELLITUS WITH HYPERGLYCEMIA, WITH LONG-TERM CURRENT USE OF INSULIN: ICD-10-CM

## 2022-07-20 DIAGNOSIS — Z79.4 TYPE 2 DIABETES MELLITUS WITH HYPERGLYCEMIA, WITH LONG-TERM CURRENT USE OF INSULIN: ICD-10-CM

## 2022-07-20 RX ORDER — DULAGLUTIDE 0.75 MG/.5ML
0.75 INJECTION, SOLUTION SUBCUTANEOUS
Qty: 12 PEN | Refills: 2 | Status: SHIPPED | OUTPATIENT
Start: 2022-07-20 | End: 2022-10-18

## 2022-07-20 NOTE — TELEPHONE ENCOUNTER
----- Message from Shruthi Greene sent at 7/19/2022  2:53 PM CDT -----  Regarding: med refill  .Type:  RX Refill Request    Who Called: Pt  Refill or New Rx:Refill  RX Name and Strength:blood sugar diagnostic Strp  How is the patient currently taking it? (ex. 1XDay):  Is this a 30 day or 90 day RX:  Preferred Pharmacy with phone number:[facility] [address]  Local or Mail Order:Local  Ordering Provider:Juanpablo  Would the patient rather a call back or a response via [facility]? Call back  Best Call Back Number:[phone]  Additional Information:     .Type:  RX Refill Request    Who Called: Pt  Refill or New Rx:Refill  RX Name and Strength:dulaglutide (TRULICITY) 0.75 mg/0.5 mL pen injector  How is the patient currently taking it? (ex. 1XDay):  Is this a 30 day or 90 day RX:  Preferred Pharmacy with phone number:[facility] [address]  Local or Mail Order:Local  Ordering Provider:Juanpablo  Would the patient rather a call back or a response via [facility]? Call back  Best Call Back Number:[phone]  Additional Information:

## 2022-07-20 NOTE — TELEPHONE ENCOUNTER
----- Message from Shruthi Greene sent at 7/19/2022  2:53 PM CDT -----  Regarding: med refill  .Type:  RX Refill Request    Who Called: Pt  Refill or New Rx:Refill  RX Name and Strength:blood sugar diagnostic Strp  How is the patient currently taking it? (ex. 1XDay):  Is this a 30 day or 90 day RX:  Preferred Pharmacy with phone number:[facility] [address]  Local or Mail Order:Local  Ordering Provider:Jaunpablo  Would the patient rather a call back or a response via [facility]? Call back  Best Call Back Number:[phone]  Additional Information:     .Type:  RX Refill Request    Who Called: Pt  Refill or New Rx:Refill  RX Name and Strength:dulaglutide (TRULICITY) 0.75 mg/0.5 mL pen injector  How is the patient currently taking it? (ex. 1XDay):  Is this a 30 day or 90 day RX:  Preferred Pharmacy with phone number:[facility] [address]  Local or Mail Order:Local  Ordering Provider:Juanpablo  Would the patient rather a call back or a response via [facility]? Call back  Best Call Back Number:[phone]  Additional Information:

## 2022-07-20 NOTE — TELEPHONE ENCOUNTER
LOV: 7.1.22  NOV: 10.4.22  Rx's sent to pharmacy. Patient notified via voicemail that rx's (requested) were sent to pharmacy. Elie

## 2022-07-27 RX ORDER — PEN NEEDLE, DIABETIC 31 GX5/16"
NEEDLE, DISPOSABLE MISCELLANEOUS
COMMUNITY
Start: 2022-06-24

## 2022-07-28 ENCOUNTER — TELEPHONE (OUTPATIENT)
Dept: DIABETES | Facility: CLINIC | Age: 57
End: 2022-07-28

## 2022-07-28 ENCOUNTER — OFFICE VISIT (OUTPATIENT)
Dept: OPHTHALMOLOGY | Facility: CLINIC | Age: 57
End: 2022-07-28
Payer: COMMERCIAL

## 2022-07-28 ENCOUNTER — CLINICAL SUPPORT (OUTPATIENT)
Dept: CARDIOLOGY | Facility: CLINIC | Age: 57
End: 2022-07-28
Payer: COMMERCIAL

## 2022-07-28 ENCOUNTER — CLINICAL SUPPORT (OUTPATIENT)
Dept: DIABETES | Facility: CLINIC | Age: 57
End: 2022-07-28
Payer: COMMERCIAL

## 2022-07-28 VITALS — BODY MASS INDEX: 41.87 KG/M2 | WEIGHT: 275.38 LBS

## 2022-07-28 VITALS — HEART RATE: 66 BPM | SYSTOLIC BLOOD PRESSURE: 116 MMHG | DIASTOLIC BLOOD PRESSURE: 70 MMHG

## 2022-07-28 VITALS — WEIGHT: 279 LBS | BODY MASS INDEX: 42.28 KG/M2 | HEIGHT: 68 IN

## 2022-07-28 DIAGNOSIS — H25.13 NUCLEAR SCLEROTIC CATARACT OF BOTH EYES: ICD-10-CM

## 2022-07-28 DIAGNOSIS — I20.89 ANGINA OF EFFORT: ICD-10-CM

## 2022-07-28 DIAGNOSIS — Z79.4 TYPE 2 DIABETES MELLITUS WITH HYPERGLYCEMIA, WITH LONG-TERM CURRENT USE OF INSULIN: ICD-10-CM

## 2022-07-28 DIAGNOSIS — E11.65 TYPE 2 DIABETES MELLITUS WITH HYPERGLYCEMIA, WITH LONG-TERM CURRENT USE OF INSULIN: ICD-10-CM

## 2022-07-28 DIAGNOSIS — I42.9 CARDIOMYOPATHY, UNSPECIFIED TYPE: Primary | ICD-10-CM

## 2022-07-28 DIAGNOSIS — E11.65 TYPE 2 DIABETES MELLITUS WITH HYPERGLYCEMIA, UNSPECIFIED WHETHER LONG TERM INSULIN USE: Primary | ICD-10-CM

## 2022-07-28 DIAGNOSIS — D23.10: Primary | ICD-10-CM

## 2022-07-28 DIAGNOSIS — H52.13 MYOPIA OF BOTH EYES: ICD-10-CM

## 2022-07-28 DIAGNOSIS — R07.9 CHEST PAIN, UNSPECIFIED TYPE: ICD-10-CM

## 2022-07-28 PROCEDURE — 93005 ELECTROCARDIOGRAM TRACING: CPT

## 2022-07-28 PROCEDURE — 99212 OFFICE O/P EST SF 10 MIN: CPT | Mod: PBBFAC,PO

## 2022-07-28 PROCEDURE — 92250 FUNDUS PHOTOGRAPHY W/I&R: CPT | Mod: PBBFAC,PO

## 2022-07-28 PROCEDURE — G0108 DIAB MANAGE TRN  PER INDIV: HCPCS | Mod: ,,, | Performed by: INTERNAL MEDICINE

## 2022-07-28 PROCEDURE — G0108 PR DIAB MANAGE TRN  PER INDIV: ICD-10-PCS | Mod: ,,, | Performed by: INTERNAL MEDICINE

## 2022-07-28 PROCEDURE — 99214 OFFICE O/P EST MOD 30 MIN: CPT | Mod: PBBFAC,27

## 2022-07-28 NOTE — PROGRESS NOTES
Assessment/Plan   1. Apocrine hidrocystoma left lateral canthus  - Last year had right sided apocrine hidrocystoma removed without complication or recurrence  - Lesion on left lid has been slowly growing over 6 years, now bothersome and would like it removed  - Seen with Dr. Grant, pt may benefit from having excision done in OR as procedure may involve skin flap, but OR is not necessary. Patient was previously scheduled to see Dr. Grant, but that appointment never happened    2. NSC, bilateral  - Minimal, CTM    3. Myopia bilateral  - Mrx today to 20/20    4. DM without retinopathy  - Annual DFE (next due 7/2023)    Follow-up with Dr. Grant, RTC in 1yr for annual DFE

## 2022-07-28 NOTE — PROGRESS NOTES
Diabetes Care Specialist Progress Note  Author: Phuong Gutierrez RN  Date: 7/28/2022    Program Intake  Reason for Diabetes Program Visit:: Intervention  Type of Intervention:: Individual  Individual: Education  Education: Self-Management Skill Review    Lab Results   Component Value Date    HGBA1C 10.6 (H) 06/22/2022       Clinical                                  Additional Social History                                    Diabetes Self-Management Skills Assessment         Nutrition/Healthy Eating  Assessment indicates:: Adequate understanding  Area of need?: No                      Home Blood Glucose Monitoring  Patient states that blood sugar is checked at home daily.: yes  Monitoring Method:: home glucometer  How often do you check your blood sugar?: 3 times a day  When do you check your blood sugar?: Before breakfast, Before lunch, Before dinner  When you check what is your typical blood sugar range? :   Blood glucose logs:: yes, encouraged to keep logs, encouraged to bring logs to provider visits  Blood glucose logs reviewed today?: yes  Home Blood Glucose Monitoring Skills Assessment Completed: : Yes  Assessment indicates:: Adequate understanding  Area of need?: No         Acute Complications  Patient is able to identify types of acute complications: Yes  Patient Identified:: Hypoglycemia  Patient is able to state the basic meaning of hypoglycemia?: Yes  Able to state the blood sugar range for hypoglycemia?: yes  Patient stated range:: <70  Patient can identify general symptoms of hypoglycemia: yes  Patient identified:: hunger, shakiness, sweating, dizziness  Acute Complications Skills Assessment Completed: : Yes  Assessment indicates:: Adequate understanding  Area of need?: No    Chronic Complications  Patient can identify major chronic complications of diabetes.: yes  Stated chronic complications:: heart disease/heart attack  Patient can identify ways to prevent or delay diabetes complications.:  yes  Stated ways to prevent complications:: controlling blood sugar, controlling cholesterol and triglycerides  Patient is aware that having diabetes increases risk of heart disease?: Yes  Patient is aware that heart disease is the leading cause of death and disability in people with diabetes?: Yes  Patient able to state risk factors for heart disease?: Yes  Patient stated risk factors for heart disease:: High blood pressure, Having diabetes, High cholesterol  Patient is taking statin?: Yes  Chronic Complications Skills Assessment Completed: : Yes  Assessment indicates:: Adequate understanding  Area of need?: No           Diabetes Self Support Plan         Assessment Summary and Plan    Based on today's diabetes care assessment, the following areas of need were identified:      Social 7/6/2022   Support No   Access to Mass Media/Tech No   Culture/Catholic No   Communication No   Health Literacy No        Clinical 7/6/2022   Medication Adherence No   Nutritional Status No        Diabetes Self-Management Skills 7/28/2022   Diabetes Disease Process/Treatment Options -   Nutrition/Healthy Eating No   Physical Activity/Exercise -   Medication -   Home Blood Glucose Monitoring No   Acute Complications No   Chronic Complications No   Psychosocial/Coping -          Today's interventions were provided through individual discussion, instruction, and written materials were provided.      Patient verbalized understanding of instruction and written materials.  Pt was able to return back demonstration of instructions today. Patient understood key points, needs reinforcement and further instruction.     Diabetes Self-Management Care Plan:    Today's Diabetes Self-Management Care Plan was developed with Chadwick's input. Chadwick has agreed to work toward the following goal(s) to improve his/her overall diabetes control.      Care Plan: Diabetes Management   Updates made since 6/28/2022 12:00 AM      Care Plan: Diabetes Management    Updates made since 6/28/2022 12:00 AM      Problem: Healthy Eating       Long-Range Goal: Eat 3 meals daily with 30g/2 servings of Carbohydrate per meal.    Start Date: 7/28/2022   Barriers: No Barriers Identified      Task: Reviewed the sources and role of Carbohydrate, Protein, and Fat and how each nutrient impacts blood sugar. Completed 7/28/2022      Task: Provided visual examples using dry measuring cups, food models, and other familiar objects such as computer mouse, deck or cards, tennis ball etc. to help with visualization of portions. Completed 7/28/2022      Task: Explained how to count carbohydrates using the food label and the use of dry measuring cups for accurate carb counting. Completed 7/28/2022      Task: Recommended replacing beverages containing high sugar content with noncaloric/sugar free options and/or water. Completed 7/28/2022      Task: Review the importance of balancing carbohydrates with each meal using portion control techniques to count servings of carbohydrate and label reading to identify serving size and amount of total carbs per serving. Completed 7/28/2022      Task: Provided Sample plate method and reviewed the use of the plate to estimate amounts of carbohydrate per meal. Completed 7/28/2022          Follow Up Plan     Follow up in about 3 months (around 10/28/2022) for f/u labs and glucose monitioring . Testing glucose 3-4 times a day.  Highest glucose was 146 after eating more carbs for dinner that night. He did take 40units of Levemir that evening but admits to not taking medication since glucose has been so low.  He had Trulicity samples and took once a week for two weeks but insurance didn't cover so not taking.  Pt. Would like me to send update to PCP to see what plan is for medication going forward. Sent update to pcp today.   He is having heart cath 8/1/22 so exercise deferred for now. Reviewed hypoglycemia management, and long term complication of dm.  Plan f/u in 3  months for new labs and glucose monitoring.     Today's care plan and follow up schedule was discussed with patient.  Chadwick verbalized understanding of the care plan, goals, and agrees to follow up plan.        The patient was encouraged to communicate with his/her health care provider/physician and care team regarding his/her condition(s) and treatment.  I provided the patient with my contact information today and encouraged to contact me via phone or [facility] Patient Portal as needed.     Length of Visit   Total Time: 30 Minutes

## 2022-07-28 NOTE — PROGRESS NOTES
Here for Pre-op LHC. Instructions discussed, printed instructions given to patient, questions encouraged and answered.

## 2022-07-28 NOTE — TELEPHONE ENCOUNTER
In office today for Dm f/u.  Testing glucose 3-4 times a day.  Highest glucose was 146 after eating more carbs for dinner that night. He did take 40units of Levemir that evening but admits to not taking medication since glucose has been so low.  He had Trulicity samples and took once a week for two weeks but insurance didn't cover so not taking. He would like you to have update to for new medication recommendations. He would like to know if he can try no medication or if you would like to swap him to metformin.  Please advise.

## 2022-08-01 ENCOUNTER — HOSPITAL ENCOUNTER (OUTPATIENT)
Facility: HOSPITAL | Age: 57
Discharge: HOME | End: 2022-08-01
Attending: INTERNAL MEDICINE | Admitting: INTERNAL MEDICINE
Payer: COMMERCIAL

## 2022-08-01 ENCOUNTER — TELEPHONE (OUTPATIENT)
Dept: FAMILY MEDICINE | Facility: CLINIC | Age: 57
End: 2022-08-01
Payer: COMMERCIAL

## 2022-08-01 VITALS
TEMPERATURE: 98 F | HEART RATE: 52 BPM | HEIGHT: 68 IN | RESPIRATION RATE: 18 BRPM | DIASTOLIC BLOOD PRESSURE: 72 MMHG | WEIGHT: 271.19 LBS | SYSTOLIC BLOOD PRESSURE: 119 MMHG | OXYGEN SATURATION: 94 % | BODY MASS INDEX: 41.1 KG/M2

## 2022-08-01 DIAGNOSIS — I42.9 CARDIOMYOPATHY, UNSPECIFIED TYPE: Chronic | ICD-10-CM

## 2022-08-01 DIAGNOSIS — I20.89 ANGINA OF EFFORT: ICD-10-CM

## 2022-08-01 DIAGNOSIS — E11.9 TYPE 2 DIABETES MELLITUS WITHOUT COMPLICATION, UNSPECIFIED WHETHER LONG TERM INSULIN USE: Primary | ICD-10-CM

## 2022-08-01 LAB — POCT GLUCOSE: 103 MG/DL (ref 70–110)

## 2022-08-01 PROCEDURE — C1887 CATHETER, GUIDING: HCPCS | Performed by: INTERNAL MEDICINE

## 2022-08-01 PROCEDURE — 25000003 PHARM REV CODE 250: Performed by: INTERNAL MEDICINE

## 2022-08-01 PROCEDURE — 99152 MOD SED SAME PHYS/QHP 5/>YRS: CPT | Performed by: INTERNAL MEDICINE

## 2022-08-01 PROCEDURE — 63600175 PHARM REV CODE 636 W HCPCS: Performed by: INTERNAL MEDICINE

## 2022-08-01 PROCEDURE — C1769 GUIDE WIRE: HCPCS | Performed by: INTERNAL MEDICINE

## 2022-08-01 PROCEDURE — 25000003 PHARM REV CODE 250

## 2022-08-01 PROCEDURE — 25500020 PHARM REV CODE 255: Performed by: INTERNAL MEDICINE

## 2022-08-01 PROCEDURE — 93458 L HRT ARTERY/VENTRICLE ANGIO: CPT | Performed by: INTERNAL MEDICINE

## 2022-08-01 PROCEDURE — C1894 INTRO/SHEATH, NON-LASER: HCPCS | Performed by: INTERNAL MEDICINE

## 2022-08-01 PROCEDURE — 25000003 PHARM REV CODE 250: Performed by: STUDENT IN AN ORGANIZED HEALTH CARE EDUCATION/TRAINING PROGRAM

## 2022-08-01 RX ORDER — FENTANYL CITRATE 50 UG/ML
INJECTION, SOLUTION INTRAMUSCULAR; INTRAVENOUS
Status: DISCONTINUED | OUTPATIENT
Start: 2022-08-01 | End: 2022-08-01 | Stop reason: HOSPADM

## 2022-08-01 RX ORDER — LIDOCAINE HYDROCHLORIDE 10 MG/ML
INJECTION INFILTRATION; PERINEURAL
Status: DISCONTINUED | OUTPATIENT
Start: 2022-08-01 | End: 2022-08-01 | Stop reason: HOSPADM

## 2022-08-01 RX ORDER — ALBUTEROL SULFATE 90 UG/1
2 AEROSOL, METERED RESPIRATORY (INHALATION) DAILY PRN
Qty: 18 G | Refills: 0 | Status: SHIPPED | OUTPATIENT
Start: 2022-08-01

## 2022-08-01 RX ORDER — MIDAZOLAM HYDROCHLORIDE 1 MG/ML
INJECTION, SOLUTION INTRAMUSCULAR; INTRAVENOUS
Status: DISCONTINUED | OUTPATIENT
Start: 2022-08-01 | End: 2022-08-01 | Stop reason: HOSPADM

## 2022-08-01 RX ORDER — METFORMIN HYDROCHLORIDE 500 MG/1
500 TABLET ORAL 2 TIMES DAILY WITH MEALS
Qty: 60 TABLET | Refills: 3 | Status: SHIPPED | OUTPATIENT
Start: 2022-08-01 | End: 2022-10-04 | Stop reason: SDUPTHER

## 2022-08-01 RX ORDER — METFORMIN HYDROCHLORIDE 500 MG/1
500 TABLET ORAL 2 TIMES DAILY WITH MEALS
COMMUNITY
End: 2022-08-01 | Stop reason: SDUPTHER

## 2022-08-01 RX ORDER — SODIUM CHLORIDE 9 MG/ML
INJECTION, SOLUTION INTRAVENOUS ONCE
Status: COMPLETED | OUTPATIENT
Start: 2022-08-01 | End: 2022-08-01

## 2022-08-01 RX ORDER — ACETAMINOPHEN 325 MG/1
650 TABLET ORAL EVERY 4 HOURS PRN
Status: DISCONTINUED | OUTPATIENT
Start: 2022-08-01 | End: 2022-08-01 | Stop reason: HOSPADM

## 2022-08-01 RX ORDER — ONDANSETRON 4 MG/1
8 TABLET, ORALLY DISINTEGRATING ORAL EVERY 8 HOURS PRN
Status: DISCONTINUED | OUTPATIENT
Start: 2022-08-01 | End: 2022-08-01 | Stop reason: HOSPADM

## 2022-08-01 RX ORDER — DIAZEPAM 5 MG/1
5 TABLET ORAL ONCE
Status: COMPLETED | OUTPATIENT
Start: 2022-08-01 | End: 2022-08-01

## 2022-08-01 RX ORDER — DIPHENHYDRAMINE HCL 25 MG
25 CAPSULE ORAL ONCE
Status: COMPLETED | OUTPATIENT
Start: 2022-08-01 | End: 2022-08-01

## 2022-08-01 RX ORDER — ASPIRIN 81 MG/1
81 TABLET ORAL DAILY
Status: DISCONTINUED | OUTPATIENT
Start: 2022-08-02 | End: 2022-08-01 | Stop reason: HOSPADM

## 2022-08-01 RX ORDER — HEPARIN SODIUM 5000 [USP'U]/ML
INJECTION, SOLUTION INTRAVENOUS; SUBCUTANEOUS
Status: DISCONTINUED | OUTPATIENT
Start: 2022-08-01 | End: 2022-08-01 | Stop reason: HOSPADM

## 2022-08-01 RX ADMIN — SODIUM CHLORIDE: 9 INJECTION, SOLUTION INTRAVENOUS at 06:08

## 2022-08-01 RX ADMIN — DIPHENHYDRAMINE HYDROCHLORIDE 25 MG: 25 CAPSULE ORAL at 06:08

## 2022-08-01 RX ADMIN — ACETAMINOPHEN 650 MG: 325 TABLET ORAL at 10:08

## 2022-08-01 RX ADMIN — DIAZEPAM 5 MG: 5 TABLET ORAL at 06:08

## 2022-08-01 RX ADMIN — BACITRACIN ZINC, NEOMYCIN, POLYMYXIN B 1 EACH: 400; 3.5; 5 OINTMENT TOPICAL at 09:08

## 2022-08-01 NOTE — TELEPHONE ENCOUNTER
Spoke with Phuong Hernandes in DM management who states she will contact pt tomorrow with update of orders and next appt due to pt is having Angiogram today. Noticed that Metformin is not yet sent to pharmacy. Sent at this time per Dr. Geronimo's orders.

## 2022-08-01 NOTE — DISCHARGE INSTRUCTIONS
Keep right hand/wrist still and elevated X 24 hours. You may remove the armboard and start using  Your right hand for simple activities in 24 hours. No heavy lifting or strenuous activities X 5 days, no activities that may cause an infection at the puncture site X 5 days.  Continue current medications.   Keep dressing clean and dry X 48 hours. You may remove the dressing in 48 hours, wash puncture   Site gently with mild soap and keep open to air.

## 2022-08-01 NOTE — Clinical Note
38 ml of contrast were injected throughout the case. 12 mL of contrast was the total wasted during the case. 50 mL was the total amount used during the case.

## 2022-08-01 NOTE — INTERVAL H&P NOTE
The patient has been examined and the H&P has been reviewed:    There have been no changes to the patient's history, physical, or plan since the patient was last seen this month for HFrEF diagnosis. The patient is scheduled for C/Coronary Angiogram +/- PCI +/- aortic pressures +/- LV ventriculogram today. All risks and benefits regarding the procedure have been discussed with the patient in detail including but not limited to bleeding, infection, renal failure, worsening condition, myocardial infarction, stroke, and failure of intent of procedure. The patient has expressed understanding of all risks and benefits for the procedure. All questions and concerns have been answered. Informed consent forms have been explained to the patient and all consents have been signed. Please see H&P/note from 7/12/22 for further details. He has taken his Entresto and Metoprolol this am.    Berenice Andrade  PGY 5  [employer] [facility]  Attending Addendum to follow

## 2022-08-01 NOTE — Clinical Note
The radial band was applied to the right radial artery. 10 cc's of air were inserted into the closure device. Fingers warm to touch. Cap refill < 3 sec. Move fingers w/ out any pain or discomfort.

## 2022-08-01 NOTE — Clinical Note
Pt reports that he feels better. Orders to continue bolus of fluids for 500 cc's total. V/S improved and pt denies any pain.

## 2022-08-01 NOTE — OP NOTE
INTERVENTIONAL CARDIOLOGY SERVICE  [facility]  CARDIAC CATHETERIZATION REPORT             Attending Cardiologist:   Attestation: The attending physician was present, and supervised the entire procedure.  Attending Cardiologist: Lev Manuel MD  General Cardiology Fellow: Berenice Andrade MD    Consent, preparation, timeout, general procedures:   · Informed Consent. The risks and alternatives of the procedures and conscious sedation were explained and informed consent was obtained.   · The patient was brought to the cath lab and placed on the table. The planned puncture sites were prepped and draped in the usual sterile fashion.  · Time-out. A safety time-out was performed prior to initiation of the procedure.   · Moderate sedation was used with fentanyl and midazolam; please see RN record for doses.  · 1% lidocaine was infiltrated for local anesthesia at each access site.      Access, diagnostic and adjunctive procedures description:       ACCESS  · Radial access      Hemostasis:     · Radial band      Complications: There were no complications.      Medications given: See RN record for details.   See above for anticoagulation.     IMPRESSIONS:     · No angiographic evidence of obstructive coronary artery disease  · Non-obstructive CAD  · RCA 20% Ostial  · LM patent  · LAD Patent  · LCx Patent  · Large OM 1 with ostial/proximal 60% eccentric lesion  · Likely non-ischemic cardiomyopathy  · LVEDP 8mmHg         RECOMMENDATIONS:     · Mild non-obstructive CAD  · ASA for lifetime.  · Continue Beta-blocker, ARNI, high-intensity statin +/-aldosterone antagonist as per the Cardiology team.  · Evidence-based, guideline-oriented, patient-centered therapies of cardiomyopathy.  · Aggressive cardiovascular risk factor modification.   · Follow-up at the [facility].  · Per protocol post-cardiac catheterization care and access site management.         Berenice Andrade  PGY 5  [employer] [facility]  Attending Addendum to  follow

## 2022-08-01 NOTE — Clinical Note
The catheter was repositioned into the left main. An angiography was performed of the left coronary arteries. Multiple views were taken. The angiography was performed via power injection.

## 2022-08-01 NOTE — Clinical Note
"Pt's B/P 66/47 and HR 44 noted on monitor. Pt has c/o "feeling hot" skin clammy and cool to touch. MD at bedside and ordered bolus IV fluids. Open fluids to begin bolus of 0.9% Normal Saline currently infusing.  Pt denies any pain and remains awake and alert. "

## 2022-08-01 NOTE — Clinical Note
The catheter was repositioned into the right coronary artery. An angiography was performed of the right coronary arteries. Multiple views were taken. The angiography was performed via power injection.

## 2022-09-08 ENCOUNTER — OFFICE VISIT (OUTPATIENT)
Dept: CARDIOLOGY | Facility: CLINIC | Age: 57
End: 2022-09-08
Payer: COMMERCIAL

## 2022-09-08 VITALS
HEIGHT: 68 IN | SYSTOLIC BLOOD PRESSURE: 124 MMHG | OXYGEN SATURATION: 96 % | BODY MASS INDEX: 40.53 KG/M2 | RESPIRATION RATE: 18 BRPM | TEMPERATURE: 98 F | DIASTOLIC BLOOD PRESSURE: 77 MMHG | HEART RATE: 68 BPM | WEIGHT: 267.44 LBS

## 2022-09-08 DIAGNOSIS — I25.10 CORONARY ARTERY DISEASE INVOLVING NATIVE CORONARY ARTERY OF NATIVE HEART WITHOUT ANGINA PECTORIS: ICD-10-CM

## 2022-09-08 DIAGNOSIS — E78.2 MIXED DIABETIC HYPERLIPIDEMIA ASSOCIATED WITH TYPE 2 DIABETES MELLITUS: ICD-10-CM

## 2022-09-08 DIAGNOSIS — Z79.4 TYPE 2 DIABETES MELLITUS WITH HYPERGLYCEMIA, WITH LONG-TERM CURRENT USE OF INSULIN: ICD-10-CM

## 2022-09-08 DIAGNOSIS — E11.65 TYPE 2 DIABETES MELLITUS WITH HYPERGLYCEMIA, WITH LONG-TERM CURRENT USE OF INSULIN: ICD-10-CM

## 2022-09-08 DIAGNOSIS — I10 HTN (HYPERTENSION), BENIGN: ICD-10-CM

## 2022-09-08 DIAGNOSIS — E78.2 MIXED HYPERLIPIDEMIA: ICD-10-CM

## 2022-09-08 DIAGNOSIS — E11.69 MIXED DIABETIC HYPERLIPIDEMIA ASSOCIATED WITH TYPE 2 DIABETES MELLITUS: ICD-10-CM

## 2022-09-08 DIAGNOSIS — E11.9 DIABETES MELLITUS WITHOUT COMPLICATION: ICD-10-CM

## 2022-09-08 DIAGNOSIS — I42.8 NON-ISCHEMIC CARDIOMYOPATHY: Primary | ICD-10-CM

## 2022-09-08 DIAGNOSIS — I50.21 ACUTE SYSTOLIC HEART FAILURE: ICD-10-CM

## 2022-09-08 PROCEDURE — 99214 OFFICE O/P EST MOD 30 MIN: CPT | Mod: S$PBB,,, | Performed by: NURSE PRACTITIONER

## 2022-09-08 PROCEDURE — 3008F PR BODY MASS INDEX (BMI) DOCUMENTED: ICD-10-PCS | Mod: CPTII,,, | Performed by: NURSE PRACTITIONER

## 2022-09-08 PROCEDURE — 4010F ACE/ARB THERAPY RXD/TAKEN: CPT | Mod: CPTII,,, | Performed by: NURSE PRACTITIONER

## 2022-09-08 PROCEDURE — 4010F PR ACE/ARB THEARPY RXD/TAKEN: ICD-10-PCS | Mod: CPTII,,, | Performed by: NURSE PRACTITIONER

## 2022-09-08 PROCEDURE — 3078F PR MOST RECENT DIASTOLIC BLOOD PRESSURE < 80 MM HG: ICD-10-PCS | Mod: CPTII,,, | Performed by: NURSE PRACTITIONER

## 2022-09-08 PROCEDURE — 3078F DIAST BP <80 MM HG: CPT | Mod: CPTII,,, | Performed by: NURSE PRACTITIONER

## 2022-09-08 PROCEDURE — 3074F SYST BP LT 130 MM HG: CPT | Mod: CPTII,,, | Performed by: NURSE PRACTITIONER

## 2022-09-08 PROCEDURE — 1159F PR MEDICATION LIST DOCUMENTED IN MEDICAL RECORD: ICD-10-PCS | Mod: CPTII,,, | Performed by: NURSE PRACTITIONER

## 2022-09-08 PROCEDURE — 3074F PR MOST RECENT SYSTOLIC BLOOD PRESSURE < 130 MM HG: ICD-10-PCS | Mod: CPTII,,, | Performed by: NURSE PRACTITIONER

## 2022-09-08 PROCEDURE — 99214 PR OFFICE/OUTPT VISIT, EST, LEVL IV, 30-39 MIN: ICD-10-PCS | Mod: S$PBB,,, | Performed by: NURSE PRACTITIONER

## 2022-09-08 PROCEDURE — 3008F BODY MASS INDEX DOCD: CPT | Mod: CPTII,,, | Performed by: NURSE PRACTITIONER

## 2022-09-08 PROCEDURE — 99215 OFFICE O/P EST HI 40 MIN: CPT | Mod: PBBFAC | Performed by: NURSE PRACTITIONER

## 2022-09-08 PROCEDURE — 1159F MED LIST DOCD IN RCRD: CPT | Mod: CPTII,,, | Performed by: NURSE PRACTITIONER

## 2022-09-08 RX ORDER — SACUBITRIL AND VALSARTAN 24; 26 MG/1; MG/1
1 TABLET, FILM COATED ORAL 2 TIMES DAILY
Qty: 180 TABLET | Refills: 3 | Status: SHIPPED | OUTPATIENT
Start: 2022-09-08 | End: 2023-09-08

## 2022-09-08 RX ORDER — ATORVASTATIN CALCIUM 40 MG/1
40 TABLET, FILM COATED ORAL DAILY
Qty: 90 TABLET | Refills: 3 | Status: SHIPPED | OUTPATIENT
Start: 2022-09-08 | End: 2023-10-05 | Stop reason: SDUPTHER

## 2022-09-08 RX ORDER — ASPIRIN 81 MG/1
81 TABLET ORAL DAILY
Qty: 90 TABLET | Refills: 3 | Status: SHIPPED | OUTPATIENT
Start: 2022-09-08 | End: 2023-12-21 | Stop reason: SDUPTHER

## 2022-09-08 RX ORDER — METOPROLOL SUCCINATE 25 MG/1
25 TABLET, EXTENDED RELEASE ORAL DAILY
Qty: 90 TABLET | Refills: 3 | Status: SHIPPED | OUTPATIENT
Start: 2022-09-08 | End: 2023-10-05 | Stop reason: SDUPTHER

## 2022-09-08 NOTE — PROGRESS NOTES
CHIEF COMPLAINT:   Chief Complaint   Patient presents with    Follow-up     Post [facility] pt denies targets                   Review of patient's allergies indicates:  No Known Allergies                                       HPI:  Chadwick Lorenzo 56 y.o. male with HFrEF/NICMO with LVEF 25% (TTE 6.23.22 ), DM II, IGT, who presents to cardiology clinic for follow-up post left heart catheterization.  The patient echocardiogram June 2022 that revealed severely decreased systolic function, with estimated ejection fraction 25% (see report below).  He underwent a subsequent cardiac catheterization on 08/01/2022 that revealed moderate CAD, 60%  to 1st Mrg lesion.     Today the patient reports that he feels well.  He denies any chest pain, shortness of breath, exertional dyspnea, orthopnea, PND, peripheral edema, lightheadedness, dizziness or syncope.  He states he is able to perform his normal ADLs and job duties without experiencing any ischemic symptoms.  Of note, the patient reports that he has made a significant lifestyle changes including diet and activity.  He reports that he has lost approximately 25 lb.  He reports compliance with current medications.      CARDIAC TESTING:  Results for orders placed during the hospital encounter of 06/22/22    Echo    Interpretation Summary  · Concentric hypertrophy and severely decreased systolic function.  · The estimated ejection fraction is 25%.  · Grade I left ventricular diastolic dysfunction.  · Mild aortic regurgitation.  · Normal right ventricular size with normal right ventricular systolic function.  · Intermediate central venous pressure (8 mmHg).  · The estimated PA systolic pressure is 16 mmHg.  · There is severe left ventricular global hypokinesis.    No results found for this or any previous visit.     Results for orders placed during the hospital encounter of 08/01/22    Cardiac catheterization    Conclusion  · The pre-procedure left ventricular end diastolic  pressure was 7.  · The 1st Mrg lesion was 60% stenosed.  · The estimated blood loss was none.  · There was non-obstructive coronary artery disease..    FINDINGS  The patient has Moderate CAD  Blood loss:  less than 10 cc.    RECOMMENDATIONS  Medical management  Maximize GDMT for patient's cardiomyopathy. Avoid cardiac toxins.  Risk factor modifications  Activity -- avoid straining with affected limb for one week  Exercise on regular basis.    EKG done 6/23  Normal sinus rhythm, rate 60. Normal ECG       Past Medical History:   has a past medical history of CHF (congestive heart failure), Diabetes mellitus, type 2, IGT (impaired glucose tolerance), Kidney stones, Low testosterone in male, Migraine, Morbid obesity, and Vitamin D deficiency. HLD    Past Surgical History:   has a past surgical history that includes rotator cuff surgery and kidney stones removal.     Family History:  family history includes Cancer in his mother; Heart disease in his father.     Social History:   reports that he has never smoked. He has never used smokeless tobacco. He reports previous alcohol use. He reports that he does not use drugs.     Allergies:  has No Known Allergies.                                                                                                                                                                                                                                                                                                                                                                                                                                                                                              Patient Active Problem List   Diagnosis    Nocturia    Dizziness    Fatigue    IGT (impaired glucose tolerance)    Chest pain    Asthma    Low testosterone    Mixed diabetic hyperlipidemia associated with type 2 diabetes mellitus    Type 2 diabetes mellitus with hyperglycemia, with long-term current  "use of insulin    Cardiomyopathy    Body mass index 40.0-44.9, adult    Colon cancer screening    FH: colon cancer in relative diagnosed at >50 years old    Hx of non anemic vitamin B12 deficiency    Immunization due    Thunderclap headache    Wellness examination    Headache    Low vitamin D level    Migraine with aura    Mild intermittent asthma    Morbid obesity    Seasonal allergies    Severe acute respiratory syndrome coronavirus 2 (SARS-CoV-2) vaccination not indicated    Decreased testosterone level    Long-term insulin use in type 2 diabetes    Primary hypertension    Mixed hyperlipidemia    Vitamin D deficiency    Angina of effort     Past Surgical History:   Procedure Laterality Date    ANGIOGRAM, CORONARY, WITH LEFT HEART CATHETERIZATION N/A 8/1/2022    Procedure: Angiogram, Coronary, with Left Heart Cath;  Surgeon: Lev Manuel MD;  Location: [facility] CATH LAB;  Service: Cardiology;  Laterality: N/A;    kidney stones removal      rotator cuff surgery       Social History     Socioeconomic History    Marital status:     Number of children: 2   Occupational History    Occupation: medical assistant    Occupation: Medical Assistant     Employer: [employer]     Comment: [employer]   Tobacco Use    Smoking status: Never    Smokeless tobacco: Never   Substance and Sexual Activity    Alcohol use: Not Currently    Drug use: Never        Family History   Problem Relation Age of Onset    Cancer Mother     Heart disease Father          Current Outpatient Medications:     albuterol (PROVENTIL/VENTOLIN HFA) 90 mcg/actuation inhaler, Inhale 2 puffs into the lungs daily as needed for Shortness of Breath., Disp: 18 g, Rfl: 0    atorvastatin (LIPITOR) 10 MG tablet, Take 1 tablet (10 mg total) by mouth once daily., Disp: 90 tablet, Rfl: 1    BD ULTRA-FINE MINI PEN NEEDLE 31 gauge x 3/16" Ndle, SMARTSIG:Pre-Filled Pen Syringe SUB-Q As Directed, Disp: , Rfl:     blood-glucose meter Misc, 1 Device by Misc.(Non-Drug; Combo " Route) route 3 (three) times daily before meals., Disp: 1 each, Rfl: 0    CONTOUR NEXT TEST STRIPS Strp, USE AS DIRECTED THREE TIMES DAILY BEFORE MEALS, Disp: 100 strip, Rfl: 0    dulaglutide (TRULICITY) 0.75 mg/0.5 mL pen injector, Inject 0.75 mg into the skin every 7 days., Disp: 12 pen, Rfl: 2    ergocalciferol (ERGOCALCIFEROL) 50,000 unit Cap, Take 1 capsule (50,000 Units total) by mouth every 7 days., Disp: 4 capsule, Rfl: 5    lancets Misc, 1 lancet by Misc.(Non-Drug; Combo Route) route 3 (three) times daily before meals., Disp: 100 each, Rfl: 11    lancing device Misc, 1 Device by Misc.(Non-Drug; Combo Route) route 3 (three) times daily before meals., Disp: 1 each, Rfl: 0    metFORMIN (GLUCOPHAGE) 500 MG tablet, Take 1 tablet (500 mg total) by mouth 2 (two) times daily with meals., Disp: 60 tablet, Rfl: 3    metoprolol succinate (TOPROL-XL) 25 MG 24 hr tablet, Take 1 tablet (25 mg total) by mouth once daily., Disp: 90 tablet, Rfl: 0    NURTEC 75 mg odt, Take 75 mg by mouth daily as needed., Disp: , Rfl:     sacubitriL-valsartan (ENTRESTO) 24-26 mg per tablet, Take 1 tablet by mouth 2 (two) times daily., Disp: 180 tablet, Rfl: 0    insulin detemir U-100 (LEVEMIR FLEXTOUCH U-100 INSULN) 100 unit/mL (3 mL) InPn pen, Inject 40 Units into the skin every evening., Disp: 12 mL, Rfl: 0    insulin regular human (HUMULIN R) 100 unit/mL (3 mL) InPn pen, Inject 6 Units into the skin 3 (three) times daily before meals. (Patient not taking: No sig reported), Disp: 5.4 mL, Rfl: 0    Current Facility-Administered Medications:     sodium chloride 0.9% flush 10 mL, 10 mL, Intravenous, PRN, Lev Manuel MD     ROS:                                                                                                                                                                             Review of Systems   Constitutional: Negative.    HENT: Negative.     Eyes: Negative.    Respiratory: Negative.  Negative for shortness of  "breath.    Cardiovascular: Negative.    Gastrointestinal: Negative.    Genitourinary: Negative.    Musculoskeletal: Negative.    Skin: Negative.    Neurological: Negative.    Endo/Heme/Allergies: Negative.    Psychiatric/Behavioral: Negative.        Blood pressure 124/77, pulse 68, temperature 98.2 °F (36.8 °C), resp. rate 18, height 5' 8" (1.727 m), weight 121.3 kg (267 lb 6.7 oz), SpO2 96 %.   PE:  Physical Exam  Constitutional:       Appearance: Normal appearance.   HENT:      Head: Normocephalic.   Eyes:      Pupils: Pupils are equal, round, and reactive to light.   Cardiovascular:      Rate and Rhythm: Normal rate and regular rhythm.      Pulses: Normal pulses.   Pulmonary:      Effort: Pulmonary effort is normal.   Musculoskeletal:         General: Normal range of motion.   Skin:     General: Skin is warm and dry.   Neurological:      General: No focal deficit present.      Mental Status: He is alert and oriented to person, place, and time.   Psychiatric:         Mood and Affect: Mood normal.         Behavior: Behavior normal.              ASSESSMENT/PLAN:  NICMO/HFrEF  - LVEF- 25% per ECHO June 2022  - s/p LHC - moderate CAD (60% 1st Mrg lesion ) 8.1.22  - Denies SOB, RAYO   - Euvolemic upon exam, warm and dry  - Continue GDMT: Entresto 24/26 mg BID, Metoprolol succinate 25 mg qd  - BP is borderline.  Will schedule a nurse visit in 3 4 week, will plan to increase Entresto to moderate dose if BP allows  - Will plan to repeat echocardiogram to reassess LV function after optimization of GDMT  - counseled patient on low-salt diet    CAD  - s/p LHC - moderate CAD (60% 1st Mrg lesion ) 8.1.22  - Denies CP, SOB  - continue ASA, Atorvastatin (increased dose), BB  - instructed patient to continue with lifestyle modifications, including heart healthy, low-cholesterol diet and    HLD  - LDL above goal- 137, trig-[id number]  - Will Increase Atorvasting to 40 to achieve LDL goal of 70 or below  - Counseled on heart healthy, " low-cholesterol diet activity as tolerated  - Patient reports, has repeat labs with PCP    DM  - A1C- 10  - Continue management per PCP     Add aspirin 81 mg   Increase atorvastatin to 40 mg q.day  Nurse visit and 3-4 weeks for BP check  Follow-up in Cardiology Clinic in 3 months or sooner if needed  Follow with PCP directed

## 2022-09-08 NOTE — PATIENT INSTRUCTIONS
Add aspirin 81 mg   Increase atorvastatin to 40 mg q.day  Nurse visit and 3-4 weeks for BP check  Follow-up in Cardiology Clinic in 3 months or sooner if needed  Follow with PCP directed

## 2022-09-17 ENCOUNTER — HISTORICAL (OUTPATIENT)
Dept: ADMINISTRATIVE | Facility: HOSPITAL | Age: 57
End: 2022-09-17
Payer: COMMERCIAL

## 2022-09-20 ENCOUNTER — LAB VISIT (OUTPATIENT)
Dept: LAB | Facility: HOSPITAL | Age: 57
End: 2022-09-20
Attending: FAMILY MEDICINE
Payer: COMMERCIAL

## 2022-09-20 DIAGNOSIS — E78.2 MIXED DIABETIC HYPERLIPIDEMIA ASSOCIATED WITH TYPE 2 DIABETES MELLITUS: ICD-10-CM

## 2022-09-20 DIAGNOSIS — E55.9 VITAMIN D DEFICIENCY: ICD-10-CM

## 2022-09-20 DIAGNOSIS — E11.65 TYPE 2 DIABETES MELLITUS WITH HYPERGLYCEMIA, WITH LONG-TERM CURRENT USE OF INSULIN: ICD-10-CM

## 2022-09-20 DIAGNOSIS — I10 PRIMARY HYPERTENSION: ICD-10-CM

## 2022-09-20 DIAGNOSIS — R79.89 LOW TESTOSTERONE: ICD-10-CM

## 2022-09-20 DIAGNOSIS — Z12.5 PROSTATE CANCER SCREENING: ICD-10-CM

## 2022-09-20 DIAGNOSIS — E11.69 MIXED DIABETIC HYPERLIPIDEMIA ASSOCIATED WITH TYPE 2 DIABETES MELLITUS: ICD-10-CM

## 2022-09-20 DIAGNOSIS — E78.2 MIXED HYPERLIPIDEMIA: ICD-10-CM

## 2022-09-20 DIAGNOSIS — Z79.4 TYPE 2 DIABETES MELLITUS WITH HYPERGLYCEMIA, WITH LONG-TERM CURRENT USE OF INSULIN: ICD-10-CM

## 2022-09-20 LAB
ALBUMIN SERPL-MCNC: 4 GM/DL (ref 3.5–5)
ALBUMIN/GLOB SERPL: 1.3 RATIO (ref 1.1–2)
ALP SERPL-CCNC: 90 UNIT/L (ref 40–150)
ALT SERPL-CCNC: 16 UNIT/L (ref 0–55)
AST SERPL-CCNC: 15 UNIT/L (ref 5–34)
BASOPHILS # BLD AUTO: 0.03 X10(3)/MCL (ref 0–0.2)
BASOPHILS NFR BLD AUTO: 0.5 %
BILIRUBIN DIRECT+TOT PNL SERPL-MCNC: 0.5 MG/DL
BUN SERPL-MCNC: 10.2 MG/DL (ref 8.4–25.7)
CALCIUM SERPL-MCNC: 9.9 MG/DL (ref 8.4–10.2)
CHLORIDE SERPL-SCNC: 110 MMOL/L (ref 98–107)
CHOLEST SERPL-MCNC: 95 MG/DL
CHOLEST/HDLC SERPL: 3 {RATIO} (ref 0–5)
CO2 SERPL-SCNC: 23 MMOL/L (ref 22–29)
CREAT SERPL-MCNC: 0.78 MG/DL (ref 0.73–1.18)
DEPRECATED CALCIDIOL+CALCIFEROL SERPL-MC: 24.7 NG/ML (ref 30–80)
EOSINOPHIL # BLD AUTO: 0.25 X10(3)/MCL (ref 0–0.9)
EOSINOPHIL NFR BLD AUTO: 3.8 %
ERYTHROCYTE [DISTWIDTH] IN BLOOD BY AUTOMATED COUNT: 13.8 % (ref 11.5–17)
EST. AVERAGE GLUCOSE BLD GHB EST-MCNC: 125.5 MG/DL
GFR SERPLBLD CREATININE-BSD FMLA CKD-EPI: >60 MLS/MIN/1.73/M2
GLOBULIN SER-MCNC: 3.2 GM/DL (ref 2.4–3.5)
GLUCOSE SERPL-MCNC: 99 MG/DL (ref 74–100)
HBA1C MFR BLD: 6 %
HCT VFR BLD AUTO: 40 % (ref 42–52)
HDLC SERPL-MCNC: 30 MG/DL (ref 35–60)
HGB BLD-MCNC: 12.6 GM/DL (ref 14–18)
IMM GRANULOCYTES # BLD AUTO: 0.01 X10(3)/MCL (ref 0–0.04)
IMM GRANULOCYTES NFR BLD AUTO: 0.2 %
LDLC SERPL CALC-MCNC: 51 MG/DL (ref 50–140)
LYMPHOCYTES # BLD AUTO: 3.05 X10(3)/MCL (ref 0.6–4.6)
LYMPHOCYTES NFR BLD AUTO: 46.8 %
MCH RBC QN AUTO: 27.9 PG (ref 27–31)
MCHC RBC AUTO-ENTMCNC: 31.5 MG/DL (ref 33–36)
MCV RBC AUTO: 88.7 FL (ref 80–94)
MONOCYTES # BLD AUTO: 0.47 X10(3)/MCL (ref 0.1–1.3)
MONOCYTES NFR BLD AUTO: 7.2 %
NEUTROPHILS # BLD AUTO: 2.7 X10(3)/MCL (ref 2.1–9.2)
NEUTROPHILS NFR BLD AUTO: 41.5 %
NRBC BLD AUTO-RTO: 0 %
PLATELET # BLD AUTO: 338 X10(3)/MCL (ref 130–400)
PMV BLD AUTO: 9.7 FL (ref 7.4–10.4)
POTASSIUM SERPL-SCNC: 4.1 MMOL/L (ref 3.5–5.1)
PROT SERPL-MCNC: 7.2 GM/DL (ref 6.4–8.3)
PSA SERPL-MCNC: 0.67 NG/ML
RBC # BLD AUTO: 4.51 X10(6)/MCL (ref 4.7–6.1)
SODIUM SERPL-SCNC: 141 MMOL/L (ref 136–145)
TESTOST SERPL-MCNC: 188.99 NG/DL (ref 220.91–715.81)
TRIGL SERPL-MCNC: 70 MG/DL (ref 34–140)
VLDLC SERPL CALC-MCNC: 14 MG/DL
WBC # SPEC AUTO: 6.5 X10(3)/MCL (ref 4.5–11.5)

## 2022-09-20 PROCEDURE — 80053 COMPREHEN METABOLIC PANEL: CPT

## 2022-09-20 PROCEDURE — 82306 VITAMIN D 25 HYDROXY: CPT

## 2022-09-20 PROCEDURE — 84403 ASSAY OF TOTAL TESTOSTERONE: CPT

## 2022-09-20 PROCEDURE — 36415 COLL VENOUS BLD VENIPUNCTURE: CPT

## 2022-09-20 PROCEDURE — 84153 ASSAY OF PSA TOTAL: CPT

## 2022-09-20 PROCEDURE — 80061 LIPID PANEL: CPT

## 2022-09-20 PROCEDURE — 83036 HEMOGLOBIN GLYCOSYLATED A1C: CPT

## 2022-09-20 PROCEDURE — 85025 COMPLETE CBC W/AUTO DIFF WBC: CPT

## 2022-10-03 PROBLEM — Z00.00 WELLNESS EXAMINATION: Status: RESOLVED | Noted: 2022-07-01 | Resolved: 2022-10-03

## 2022-10-04 ENCOUNTER — OFFICE VISIT (OUTPATIENT)
Dept: FAMILY MEDICINE | Facility: CLINIC | Age: 57
End: 2022-10-04
Payer: COMMERCIAL

## 2022-10-04 VITALS
DIASTOLIC BLOOD PRESSURE: 83 MMHG | HEART RATE: 67 BPM | RESPIRATION RATE: 20 BRPM | BODY MASS INDEX: 39.58 KG/M2 | HEIGHT: 68 IN | WEIGHT: 261.13 LBS | SYSTOLIC BLOOD PRESSURE: 118 MMHG

## 2022-10-04 DIAGNOSIS — I15.2 HYPERTENSION ASSOCIATED WITH TYPE 2 DIABETES MELLITUS: Chronic | ICD-10-CM

## 2022-10-04 DIAGNOSIS — Z79.4 TYPE 2 DIABETES MELLITUS WITH HYPERGLYCEMIA, WITH LONG-TERM CURRENT USE OF INSULIN: Primary | ICD-10-CM

## 2022-10-04 DIAGNOSIS — E55.9 VITAMIN D DEFICIENCY: ICD-10-CM

## 2022-10-04 DIAGNOSIS — E11.59 HYPERTENSION ASSOCIATED WITH TYPE 2 DIABETES MELLITUS: Chronic | ICD-10-CM

## 2022-10-04 DIAGNOSIS — E11.69 MIXED DIABETIC HYPERLIPIDEMIA ASSOCIATED WITH TYPE 2 DIABETES MELLITUS: ICD-10-CM

## 2022-10-04 DIAGNOSIS — E78.2 MIXED DIABETIC HYPERLIPIDEMIA ASSOCIATED WITH TYPE 2 DIABETES MELLITUS: ICD-10-CM

## 2022-10-04 DIAGNOSIS — E66.01 MORBID OBESITY: ICD-10-CM

## 2022-10-04 DIAGNOSIS — R79.89 LOW TESTOSTERONE: Chronic | ICD-10-CM

## 2022-10-04 DIAGNOSIS — I10 PRIMARY HYPERTENSION: ICD-10-CM

## 2022-10-04 DIAGNOSIS — Z12.11 COLON CANCER SCREENING: ICD-10-CM

## 2022-10-04 DIAGNOSIS — E11.65 TYPE 2 DIABETES MELLITUS WITH HYPERGLYCEMIA, WITH LONG-TERM CURRENT USE OF INSULIN: Primary | ICD-10-CM

## 2022-10-04 DIAGNOSIS — E78.2 MIXED HYPERLIPIDEMIA: ICD-10-CM

## 2022-10-04 PROCEDURE — 3074F SYST BP LT 130 MM HG: CPT | Mod: CPTII,,, | Performed by: FAMILY MEDICINE

## 2022-10-04 PROCEDURE — 3044F PR MOST RECENT HEMOGLOBIN A1C LEVEL <7.0%: ICD-10-PCS | Mod: CPTII,,, | Performed by: FAMILY MEDICINE

## 2022-10-04 PROCEDURE — 3079F DIAST BP 80-89 MM HG: CPT | Mod: CPTII,,, | Performed by: FAMILY MEDICINE

## 2022-10-04 PROCEDURE — 1159F MED LIST DOCD IN RCRD: CPT | Mod: CPTII,,, | Performed by: FAMILY MEDICINE

## 2022-10-04 PROCEDURE — 3008F PR BODY MASS INDEX (BMI) DOCUMENTED: ICD-10-PCS | Mod: CPTII,,, | Performed by: FAMILY MEDICINE

## 2022-10-04 PROCEDURE — 1160F RVW MEDS BY RX/DR IN RCRD: CPT | Mod: CPTII,,, | Performed by: FAMILY MEDICINE

## 2022-10-04 PROCEDURE — 1159F PR MEDICATION LIST DOCUMENTED IN MEDICAL RECORD: ICD-10-PCS | Mod: CPTII,,, | Performed by: FAMILY MEDICINE

## 2022-10-04 PROCEDURE — 3066F NEPHROPATHY DOC TX: CPT | Mod: CPTII,,, | Performed by: FAMILY MEDICINE

## 2022-10-04 PROCEDURE — 99214 OFFICE O/P EST MOD 30 MIN: CPT | Mod: ,,, | Performed by: FAMILY MEDICINE

## 2022-10-04 PROCEDURE — 4010F ACE/ARB THERAPY RXD/TAKEN: CPT | Mod: CPTII,,, | Performed by: FAMILY MEDICINE

## 2022-10-04 PROCEDURE — 3079F PR MOST RECENT DIASTOLIC BLOOD PRESSURE 80-89 MM HG: ICD-10-PCS | Mod: CPTII,,, | Performed by: FAMILY MEDICINE

## 2022-10-04 PROCEDURE — 3074F PR MOST RECENT SYSTOLIC BLOOD PRESSURE < 130 MM HG: ICD-10-PCS | Mod: CPTII,,, | Performed by: FAMILY MEDICINE

## 2022-10-04 PROCEDURE — 3061F PR NEG MICROALBUMINURIA RESULT DOCUMENTED/REVIEW: ICD-10-PCS | Mod: CPTII,,, | Performed by: FAMILY MEDICINE

## 2022-10-04 PROCEDURE — 3066F PR DOCUMENTATION OF TREATMENT FOR NEPHROPATHY: ICD-10-PCS | Mod: CPTII,,, | Performed by: FAMILY MEDICINE

## 2022-10-04 PROCEDURE — 4010F PR ACE/ARB THEARPY RXD/TAKEN: ICD-10-PCS | Mod: CPTII,,, | Performed by: FAMILY MEDICINE

## 2022-10-04 PROCEDURE — 99214 PR OFFICE/OUTPT VISIT, EST, LEVL IV, 30-39 MIN: ICD-10-PCS | Mod: ,,, | Performed by: FAMILY MEDICINE

## 2022-10-04 PROCEDURE — 3061F NEG MICROALBUMINURIA REV: CPT | Mod: CPTII,,, | Performed by: FAMILY MEDICINE

## 2022-10-04 PROCEDURE — 3011F PR LIPID PANEL RESULTS DOCUMENTED AND REVIEWED: ICD-10-PCS | Mod: CPTII,,, | Performed by: FAMILY MEDICINE

## 2022-10-04 PROCEDURE — 3011F LIPID PANEL DOC REV: CPT | Mod: CPTII,,, | Performed by: FAMILY MEDICINE

## 2022-10-04 PROCEDURE — 3008F BODY MASS INDEX DOCD: CPT | Mod: CPTII,,, | Performed by: FAMILY MEDICINE

## 2022-10-04 PROCEDURE — 3044F HG A1C LEVEL LT 7.0%: CPT | Mod: CPTII,,, | Performed by: FAMILY MEDICINE

## 2022-10-04 PROCEDURE — 1160F PR REVIEW ALL MEDS BY PRESCRIBER/CLIN PHARMACIST DOCUMENTED: ICD-10-PCS | Mod: CPTII,,, | Performed by: FAMILY MEDICINE

## 2022-10-04 RX ORDER — ERGOCALCIFEROL 1.25 MG/1
50000 CAPSULE ORAL
Qty: 4 CAPSULE | Refills: 5 | Status: SHIPPED | OUTPATIENT
Start: 2022-10-04 | End: 2023-04-02

## 2022-10-04 RX ORDER — METFORMIN HYDROCHLORIDE 500 MG/1
500 TABLET ORAL 2 TIMES DAILY WITH MEALS
Qty: 60 TABLET | Refills: 3 | Status: SHIPPED | OUTPATIENT
Start: 2022-10-04 | End: 2023-02-24 | Stop reason: SDUPTHER

## 2022-10-04 RX ORDER — TESTOSTERONE CYPIONATE 1000 MG/10ML
100 INJECTION, SOLUTION INTRAMUSCULAR
Qty: 5 ML | Refills: 0 | Status: SHIPPED | OUTPATIENT
Start: 2022-10-04 | End: 2022-10-05 | Stop reason: SDUPTHER

## 2022-10-04 NOTE — PROGRESS NOTES
Subjective:      Patient ID: Chadwick Lorenzo is a 57 y.o. male.    Chief Complaint: Follow-up (Diabetes follow up/Med refill/Results for blood work)    Patient with type 2 DM and other comorbid conditions, as well as low Testosterone, here for follow-up visit. No acute complaints      Problem List Items Addressed This Visit       Low testosterone (Chronic)    Relevant Medications    testosterone cypionate (DEPOTESTOTERONE CYPIONATE) 100 mg/mL injection    Other Relevant Orders    Testosterone    BMI 39.0-39.9,adult (Chronic)    Hypertension associated with type 2 diabetes mellitus (Chronic)    Relevant Medications    metFORMIN (GLUCOPHAGE) 500 MG tablet    Other Relevant Orders    Hemoglobin A1C    Comprehensive Metabolic Panel    Lipid Panel    Mixed diabetic hyperlipidemia associated with type 2 diabetes mellitus    Relevant Medications    metFORMIN (GLUCOPHAGE) 500 MG tablet    Type 2 diabetes mellitus with hyperglycemia, with long-term current use of insulin - Primary    Relevant Medications    metFORMIN (GLUCOPHAGE) 500 MG tablet    Other Relevant Orders    Hemoglobin A1C    Comprehensive Metabolic Panel    Lipid Panel    Colon cancer screening    Relevant Orders    Ambulatory referral/consult to Gastroenterology    Morbid obesity    Primary hypertension    Mixed hyperlipidemia    Vitamin D deficiency    Relevant Medications    ergocalciferol (ERGOCALCIFEROL) 50,000 unit Cap       The patient's Health Maintenance was reviewed and the following appears to be due:   Health Maintenance Due   Topic Date Due    Hepatitis C Screening  Never done    Pneumococcal Vaccines (Age 0-64) (1 - PCV) Never done    HIV Screening  Never done    Colorectal Cancer Screening  Never done    Shingles Vaccine (1 of 2) Never done    COVID-19 Vaccine (4 - Booster for Pfizer series) 11/22/2021       Past Medical History:  Past Medical History:   Diagnosis Date    CHF (congestive heart failure)     Diabetes mellitus, type 2     IGT  "(impaired glucose tolerance)     Kidney stones     Low testosterone in male     Migraine     Morbid obesity     Vitamin D deficiency      Past Surgical History:   Procedure Laterality Date    ANGIOGRAM, CORONARY, WITH LEFT HEART CATHETERIZATION N/A 8/1/2022    Procedure: Angiogram, Coronary, with Left Heart Cath;  Surgeon: Lev Manuel MD;  Location: [facility] CATH LAB;  Service: Cardiology;  Laterality: N/A;    kidney stones removal      rotator cuff surgery       Review of patient's allergies indicates:  No Known Allergies  Current Outpatient Medications on File Prior to Visit   Medication Sig Dispense Refill    albuterol (PROVENTIL/VENTOLIN HFA) 90 mcg/actuation inhaler Inhale 2 puffs into the lungs daily as needed for Shortness of Breath. 18 g 0    aspirin (ECOTRIN) 81 MG EC tablet Take 1 tablet (81 mg total) by mouth once daily. 90 tablet 3    atorvastatin (LIPITOR) 40 MG tablet Take 1 tablet (40 mg total) by mouth once daily. 90 tablet 3    BD ULTRA-FINE MINI PEN NEEDLE 31 gauge x 3/16" Ndle SMARTSIG:Pre-Filled Pen Syringe SUB-Q As Directed      blood-glucose meter Misc 1 Device by Misc.(Non-Drug; Combo Route) route 3 (three) times daily before meals. 1 each 0    CONTOUR NEXT TEST STRIPS Strp USE AS DIRECTED THREE TIMES DAILY BEFORE MEALS 100 strip 0    dulaglutide (TRULICITY) 0.75 mg/0.5 mL pen injector Inject 0.75 mg into the skin every 7 days. 12 pen 2    insulin regular human (HUMULIN R) 100 unit/mL (3 mL) InPn pen Inject 6 Units into the skin 3 (three) times daily before meals. 5.4 mL 0    lancets Misc 1 lancet by Misc.(Non-Drug; Combo Route) route 3 (three) times daily before meals. 100 each 11    lancing device Misc 1 Device by Misc.(Non-Drug; Combo Route) route 3 (three) times daily before meals. 1 each 0    metoprolol succinate (TOPROL-XL) 25 MG 24 hr tablet Take 1 tablet (25 mg total) by mouth once daily. 90 tablet 3    NURTEC 75 mg odt Take 75 mg by mouth daily as needed.      sacubitriL-valsartan " "(ENTRESTO) 24-26 mg per tablet Take 1 tablet by mouth 2 (two) times daily. 180 tablet 3    [DISCONTINUED] ergocalciferol (ERGOCALCIFEROL) 50,000 unit Cap Take 1 capsule (50,000 Units total) by mouth every 7 days. 4 capsule 5    [DISCONTINUED] metFORMIN (GLUCOPHAGE) 500 MG tablet Take 1 tablet (500 mg total) by mouth 2 (two) times daily with meals. 60 tablet 3    insulin detemir U-100 (LEVEMIR FLEXTOUCH U-100 INSULN) 100 unit/mL (3 mL) InPn pen Inject 40 Units into the skin every evening. 12 mL 0    [DISCONTINUED] insulin aspart U-100 (NOVOLOG) 100 unit/mL injection Inject 6 Units into the skin 3 (three) times daily before meals. 10 mL 1     Current Facility-Administered Medications on File Prior to Visit   Medication Dose Route Frequency Provider Last Rate Last Admin    sodium chloride 0.9% flush 10 mL  10 mL Intravenous PRN Lev Manuel MD         Social History     Socioeconomic History    Marital status:     Number of children: 2   Occupational History    Occupation: medical assistant    Occupation: Medical Assistant     Employer: [employer]     Comment: [employer]   Tobacco Use    Smoking status: Never    Smokeless tobacco: Never   Substance and Sexual Activity    Alcohol use: Not Currently    Drug use: Never    Sexual activity: Yes     Family History   Problem Relation Age of Onset    Cancer Mother     Heart disease Father        Review of Systems   All other systems reviewed and are negative.    Objective:   /83 (BP Location: Right arm, Patient Position: Sitting, BP Method: Large (Automatic))   Pulse 67   Resp 20   Ht 5' 8" (1.727 m)   Wt 118.4 kg (261 lb 1.6 oz)   BMI 39.70 kg/m²     Physical Exam  Vitals and nursing note reviewed.   Constitutional:       General: He is awake.      Appearance: Normal appearance. He is well-developed and well-groomed. He is morbidly obese.   HENT:      Head: Normocephalic and atraumatic.      Right Ear: Tympanic membrane, ear canal and external ear normal.      " Left Ear: Tympanic membrane, ear canal and external ear normal.      Nose: Nose normal.      Mouth/Throat:      Mouth: Mucous membranes are dry.      Pharynx: Oropharynx is clear.   Eyes:      Extraocular Movements: Extraocular movements intact.      Conjunctiva/sclera: Conjunctivae normal.      Pupils: Pupils are equal, round, and reactive to light.   Cardiovascular:      Rate and Rhythm: Normal rate and regular rhythm.      Pulses: Normal pulses.      Heart sounds: Normal heart sounds.   Pulmonary:      Effort: Pulmonary effort is normal.      Breath sounds: Normal breath sounds.   Abdominal:      General: Abdomen is flat. Bowel sounds are normal.      Palpations: Abdomen is soft.   Musculoskeletal:         General: Normal range of motion.      Cervical back: Normal range of motion and neck supple.   Skin:     General: Skin is warm and dry.      Capillary Refill: Capillary refill takes less than 2 seconds.   Neurological:      General: No focal deficit present.      Mental Status: He is alert and oriented to person, place, and time. Mental status is at baseline.   Psychiatric:         Mood and Affect: Mood normal.         Behavior: Behavior is cooperative.         Thought Content: Thought content normal.         Judgment: Judgment normal.       Procedures     Lab Visit on 09/20/2022   Component Date Value Ref Range Status    Color, UA 09/20/2022 Yellow  Yellow, Colorless, Other, Clear Final    Appearance, UA 09/20/2022 Clear  Clear Final    Specific Gravity, UA 09/20/2022 1.028   Final    pH, UA 09/20/2022 6.0  5.0, 5.5, 6.0, 6.5, 7.0, 7.5, 8.0, 8.5 Final    Protein, UA 09/20/2022 Negative  Negative, 300  mg/dL Final    Glucose, UA 09/20/2022 Normal  Negative, Normal mg/dL Final    Ketones, UA 09/20/2022 Negative  Negative, +1, +2, +3, +4, +5, >=160, >=80 mg/dL Final    Blood, UA 09/20/2022 Negative  Negative unit/L Final    Bilirubin, UA 09/20/2022 Negative  Negative mg/dL Final    Urobilinogen, UA 09/20/2022  Normal  0.2, 1.0, Normal mg/dL Final    Nitrites, UA 09/20/2022 Negative  Negative Final    Leukocyte Esterase, UA 09/20/2022 Negative  Negative, 75  unit/L Final    WBC, UA 09/20/2022 0-5  None Seen, 0-2, 3-5, 0-5 /HPF Final    Bacteria, UA 09/20/2022 None Seen  None Seen /HPF Final    Squamous Epithelial Cells, UA 09/20/2022 Trace (A)  None Seen /HPF Final    Mucous, UA 09/20/2022 Trace (A)  None Seen /LPF Final    Hyaline Casts, UA 09/20/2022 None Seen  None Seen /lpf Final    RBC, UA 09/20/2022 0-5  None Seen, 0-2, 3-5, 0-5 /HPF Final    Urine Microalbumin 09/20/2022 8.8  <=30.0 ug/ml Final    Urine Creatinine 09/20/2022 238.6 (H)  63.0 - 166.0 mg/dL Final    Microalbumin Creatinine Ratio 09/20/2022 3.7  0.0 - 30.0 mg/gm Cr Final   Lab Visit on 09/20/2022   Component Date Value Ref Range Status    Sodium Level 09/20/2022 141  136 - 145 mmol/L Final    Potassium Level 09/20/2022 4.1  3.5 - 5.1 mmol/L Final    Chloride 09/20/2022 110 (H)  98 - 107 mmol/L Final    Carbon Dioxide 09/20/2022 23  22 - 29 mmol/L Final    Glucose Level 09/20/2022 99  74 - 100 mg/dL Final    Blood Urea Nitrogen 09/20/2022 10.2  8.4 - 25.7 mg/dL Final    Creatinine 09/20/2022 0.78  0.73 - 1.18 mg/dL Final    Calcium Level Total 09/20/2022 9.9  8.4 - 10.2 mg/dL Final    Protein Total 09/20/2022 7.2  6.4 - 8.3 gm/dL Final    Albumin Level 09/20/2022 4.0  3.5 - 5.0 gm/dL Final    Globulin 09/20/2022 3.2  2.4 - 3.5 gm/dL Final    Albumin/Globulin Ratio 09/20/2022 1.3  1.1 - 2.0 ratio Final    Bilirubin Total 09/20/2022 0.5  <=1.5 mg/dL Final    Alkaline Phosphatase 09/20/2022 90  40 - 150 unit/L Final    Alanine Aminotransferase 09/20/2022 16  0 - 55 unit/L Final    Aspartate Aminotransferase 09/20/2022 15  5 - 34 unit/L Final    eGFR 09/20/2022 >60  mls/min/1.73/m2 Final    Cholesterol Total 09/20/2022 95  <=200 mg/dL Final    HDL Cholesterol 09/20/2022 30 (L)  35 - 60 mg/dL Final    Triglyceride 09/20/2022 70  34 - 140 mg/dL Final     Cholesterol/HDL Ratio 09/20/2022 3  0 - 5 Final    Very Low Density Lipoprotein 09/20/2022 14   Final    LDL Cholesterol 09/20/2022 51.00  50.00 - 140.00 mg/dL Final    Hemoglobin A1c 09/20/2022 6.0  <=7.0 % Final    Estimated Average Glucose 09/20/2022 125.5  mg/dL Final    Vit D 25 OH 09/20/2022 24.7 (L)  30.0 - 80.0 ng/mL Final    Prostate Specific Antigen 09/20/2022 0.67  <=4.00 ng/mL Final    Testosterone Total 09/20/2022 188.99 (L)  220.91 - 715.81 ng/dL Final    WBC 09/20/2022 6.5  4.5 - 11.5 x10(3)/mcL Final    RBC 09/20/2022 4.51 (L)  4.70 - 6.10 x10(6)/mcL Final    Hgb 09/20/2022 12.6 (L)  14.0 - 18.0 gm/dL Final    Hct 09/20/2022 40.0 (L)  42.0 - 52.0 % Final    MCV 09/20/2022 88.7  80.0 - 94.0 fL Final    MCH 09/20/2022 27.9  27.0 - 31.0 pg Final    MCHC 09/20/2022 31.5 (L)  33.0 - 36.0 mg/dL Final    RDW 09/20/2022 13.8  11.5 - 17.0 % Final    Platelet 09/20/2022 338  130 - 400 x10(3)/mcL Final    MPV 09/20/2022 9.7  7.4 - 10.4 fL Final    Neut % 09/20/2022 41.5  % Final    Lymph % 09/20/2022 46.8  % Final    Mono % 09/20/2022 7.2  % Final    Eos % 09/20/2022 3.8  % Final    Basophil % 09/20/2022 0.5  % Final    Lymph # 09/20/2022 3.05  0.6 - 4.6 x10(3)/mcL Final    Neut # 09/20/2022 2.7  2.1 - 9.2 x10(3)/mcL Final    Mono # 09/20/2022 0.47  0.1 - 1.3 x10(3)/mcL Final    Eos # 09/20/2022 0.25  0 - 0.9 x10(3)/mcL Final    Baso # 09/20/2022 0.03  0 - 0.2 x10(3)/mcL Final    IG# 09/20/2022 0.01  0 - 0.04 x10(3)/mcL Final    IG% 09/20/2022 0.2  % Final    NRBC% 09/20/2022 0.0  % Final   Historical on 09/17/2022   Component Date Value Ref Range Status    Color, UA 07/14/2020 Dark yellow   Final    Clarity, UA 07/14/2020 Slightly cloudy   Final    Glucose, UA 07/14/2020 Negative   Final    Bilirubin, POC 07/14/2020 Negative   Final    Ketones, UA 07/14/2020 Negative   Final    Spec Grav UA 07/14/2020 1.020   Final    Blood, UA 07/14/2020 Negative   Final    pH, UA 07/14/2020 5   Final    Protein, POC  07/14/2020 Negative   Final    Urobilinogen, UA 07/14/2020 0.2 mg/dl   Final    Nitrite, UA 07/14/2020 Negative   Final    Leukocytes, UA 07/14/2020 Negative   Final   Admission on 08/01/2022, Discharged on 08/01/2022   Component Date Value Ref Range Status    POCT Glucose 08/01/2022 103  70 - 110 mg/dL Final   Lab Visit on 07/28/2022   Component Date Value Ref Range Status    WBC 07/28/2022 6.4  4.5 - 11.5 x10(3)/mcL Final    RBC 07/28/2022 4.82  4.70 - 6.10 x10(6)/mcL Final    Hgb 07/28/2022 13.1 (L)  14.0 - 18.0 gm/dL Final    Hct 07/28/2022 41.2 (L)  42.0 - 52.0 % Final    Platelet 07/28/2022 344  130 - 400 x10(3)/mcL Final    MCV 07/28/2022 85.5  80.0 - 94.0 fL Final    MCH 07/28/2022 27.2  27.0 - 31.0 pg Final    MCHC 07/28/2022 31.8 (L)  33.0 - 36.0 mg/dL Final    RDW 07/28/2022 13.6  11.5 - 17.0 % Final    MPV 07/28/2022 9.8  7.4 - 10.4 fL Final    NRBC% 07/28/2022 0.0  % Final    PT 07/28/2022 13.8  seconds Final    INR 07/28/2022 1.09  0.00 - 1.30 Final    PTT 07/28/2022 32.4  <150.0 seconds Final    For Minimal Heparin Infusion, the goal aPTT 64-85 seconds corresponds to an anti-Xa of 0.3-0.5.    For Low Intensity and High Intensity Heparin, the goal aPTT  seconds corresponds to an anti-Xa of 0.3-0.7    Sodium Level 07/28/2022 142  136 - 145 mmol/L Final    Potassium Level 07/28/2022 4.0  3.5 - 5.1 mmol/L Final    Chloride 07/28/2022 108 (H)  98 - 107 mmol/L Final    Carbon Dioxide 07/28/2022 25  22 - 29 mmol/L Final    Glucose Level 07/28/2022 91  74 - 100 mg/dL Final    Blood Urea Nitrogen 07/28/2022 12.2  8.4 - 25.7 mg/dL Final    Creatinine 07/28/2022 0.94  0.73 - 1.18 mg/dL Final    BUN/Creatinine Ratio 07/28/2022 13   Final    Calcium Level Total 07/28/2022 10.1  8.4 - 10.2 mg/dL Final    Estimated GFR- 07/28/2022 >60  mls/min/1.73/m2 Final    Anion Gap 07/28/2022 9.0  mEq/L Final   Admission on 06/22/2022, Discharged on 06/24/2022   Component Date Value Ref Range Status     Troponin-I 06/22/2022 <0.010  0.000 - 0.045 ng/mL Final    Sodium Level 06/22/2022 135 (L)  136 - 145 mmol/L Final    Potassium Level 06/22/2022 4.5  3.5 - 5.1 mmol/L Final    Chloride 06/22/2022 103  98 - 107 mmol/L Final    Carbon Dioxide 06/22/2022 24  22 - 29 mmol/L Final    Glucose Level 06/22/2022 544 (HH)  74 - 100 mg/dL Final    Critical Result called and verified by verbal readback to: Sandra Kelley on 06/22/2022 at 19:29. Reported by [id number].    Blood Urea Nitrogen 06/22/2022 11.0  8.4 - 25.7 mg/dL Final    Creatinine 06/22/2022 1.20 (H)  0.73 - 1.18 mg/dL Final    Calcium Level Total 06/22/2022 9.6  8.4 - 10.2 mg/dL Final    Protein Total 06/22/2022 6.7  6.4 - 8.3 gm/dL Final    Albumin Level 06/22/2022 3.7  3.5 - 5.0 gm/dL Final    Globulin 06/22/2022 3.0  2.4 - 3.5 gm/dL Final    Albumin/Globulin Ratio 06/22/2022 1.2  1.1 - 2.0 ratio Final    Bilirubin Total 06/22/2022 0.4  <=1.5 mg/dL Final    Alkaline Phosphatase 06/22/2022 144  40 - 150 unit/L Final    Alanine Aminotransferase 06/22/2022 27  0 - 55 unit/L Final    Aspartate Aminotransferase 06/22/2022 18  5 - 34 unit/L Final    Estimated GFR- 06/22/2022 >60  mls/min/1.73/m2 Final    POCT Glucose 06/22/2022 >500 (H)  70 - 110 mg/dL Final    WBC 06/22/2022 6.6  4.5 - 11.5 x10(3)/mcL Final    RBC 06/22/2022 5.20  4.70 - 6.10 x10(6)/mcL Final    Hgb 06/22/2022 14.1  14.0 - 18.0 gm/dL Final    Hct 06/22/2022 43.6  42.0 - 52.0 % Final    MCV 06/22/2022 83.8  80.0 - 94.0 fL Final    MCH 06/22/2022 27.1  27.0 - 31.0 pg Final    MCHC 06/22/2022 32.3 (L)  33.0 - 36.0 mg/dL Final    RDW 06/22/2022 13.2  11.5 - 17.0 % Final    Platelet 06/22/2022 365  130 - 400 x10(3)/mcL Final    MPV 06/22/2022 10.5  9.4 - 12.4 fL Final    Neut % 06/22/2022 30.4  % Final    Lymph % 06/22/2022 58.9  % Final    Mono % 06/22/2022 6.1  % Final    Eos % 06/22/2022 3.6  % Final    Basophil % 06/22/2022 0.8  % Final    Lymph # 06/22/2022 3.88  0.6 - 4.6 x10(3)/mcL  Final    Neut # 06/22/2022 2.0 (L)  2.1 - 9.2 x10(3)/mcL Final    Mono # 06/22/2022 0.40  0.1 - 1.3 x10(3)/mcL Final    Eos # 06/22/2022 0.24  0 - 0.9 x10(3)/mcL Final    Baso # 06/22/2022 0.05  0 - 0.2 x10(3)/mcL Final    IG# 06/22/2022 0.01  0 - 0.0155 x10(3)/mcL Final    IG% 06/22/2022 0.2  0 - 0.43 % Final    NRBC% 06/22/2022 0.0  % Final    POC Glucose 06/22/2022 336 (A)  70 - 110 MG/DL Final    Troponin-I 06/23/2022 <0.010  0.000 - 0.045 ng/mL Final    BSA 06/23/2022 2.49  m2 Final    TDI SEPTAL 06/23/2022 0.07  m/s Final    LV LATERAL E/E' RATIO 06/23/2022 3.80  m/s Final    LV SEPTAL E/E' RATIO 06/23/2022 5.43  m/s Final    Right Atrial Pressure (from IVC) 06/23/2022 8  mmHg Final    EF 06/23/2022 25  % Final    Left Ventricular Outflow Tract Martine* 06/23/2022 0.531  cm/s Final    Left Ventricular Outflow Tract Martine* 06/23/2022 1.00  mmHg Final    TDI LATERAL 06/23/2022 0.10  m/s Final    PV PEAK VELOCITY 06/23/2022 1.08  cm/s Final    LVIDd 06/23/2022 4.22  3.5 - 6.0 cm Final    IVS 06/23/2022 1.24 (A)  0.6 - 1.1 cm Final    Posterior Wall 06/23/2022 1.64 (A)  0.6 - 1.1 cm Final    LVIDs 06/23/2022 2.81  2.1 - 4.0 cm Final    FS 06/23/2022 33  28 - 44 % Final    LV mass 06/23/2022 235.86  g Final    LA size 06/23/2022 3.30  cm Final    RVDD 06/23/2022 2.44  cm Final    TAPSE 06/23/2022 2.04  cm Final    Left Ventricle Relative Wall Thick* 06/23/2022 0.78  cm Final    AV regurgitation pressure 1/2 time 06/23/2022 509  ms Final    AV mean gradient 06/23/2022 2  mmHg Final    AV valve area 06/23/2022 2.53  cm2 Final    AV Velocity Ratio 06/23/2022 0.79   Final    AV index (prosthetic) 06/23/2022 0.73   Final    MV mean gradient 06/23/2022 1  mmHg Final    MV valve area p 1/2 method 06/23/2022 2.86  cm2 Final    MV valve area by continuity eq 06/23/2022 3.00  cm2 Final    E/A ratio 06/23/2022 0.79   Final    Mean e' 06/23/2022 0.09  m/s Final    E wave deceleration time 06/23/2022 203  msec Final    LVOT diameter  06/23/2022 2.10  cm Final    LVOT area 06/23/2022 3.5  cm2 Final    LVOT peak ahnk 06/23/2022 0.83  m/s Final    LVOT peak VTI 06/23/2022 16.90  cm Final    Ao peak hank 06/23/2022 1.05  m/s Final    Ao VTI 06/23/2022 23.1  cm Final    LVOT stroke volume 06/23/2022 58.51  cm3 Final    AV peak gradient 06/23/2022 4  mmHg Final    MV peak gradient 06/23/2022 2  mmHg Final    TV rest pulmonary artery pressure 06/23/2022 16  mmHg Final    E/E' ratio 06/23/2022 4.47  m/s Final    MV Peak E Hank 06/23/2022 0.38  m/s Final    AR Max Hank 06/23/2022 1.74  m/s Final    TR Max Hank 06/23/2022 1.40  m/s Final    MV VTI 06/23/2022 19.5  cm Final    MV stenosis pressure 1/2 time 06/23/2022 77  ms Final    MV Peak A Hank 06/23/2022 0.48  m/s Final    LV Systolic Volume 06/23/2022 29.80  mL Final    LV Systolic Volume Index 06/23/2022 12.5  mL/m2 Final    LV Diastolic Volume 06/23/2022 79.50  mL Final    LV Diastolic Volume Index 06/23/2022 33.40  mL/m2 Final    LV Mass Index 06/23/2022 99  g/m2 Final    Triscuspid Valve Regurgitation Pea* 06/23/2022 8  mmHg Final    Cholesterol Total 06/22/2022 231 (H)  <=200 mg/dL Final    HDL Cholesterol 06/22/2022 33 (L)  35 - 60 mg/dL Final    Triglyceride 06/22/2022 304 (H)  34 - 140 mg/dL Final    Cholesterol/HDL Ratio 06/22/2022 7 (H)  0 - 5 Final    Very Low Density Lipoprotein 06/22/2022 61   Final    LDL Cholesterol 06/22/2022 137.00  50.00 - 140.00 mg/dL Final    Urine Protein Level 06/23/2022 <6.8  mg/dL Final    Urine Creatinine 06/23/2022 54.1 (L)  63.0 - 166.0 mg/dL Final    Urine Protein/Creatinine Ratio 06/23/2022 <125.7  <=200.0 mg/gm Cr Final    Troponin-I 06/23/2022 <0.010  0.000 - 0.045 ng/mL Final    Sodium Level 06/23/2022 137  136 - 145 mmol/L Final    Potassium Level 06/23/2022 4.5  3.5 - 5.1 mmol/L Final    Chloride 06/23/2022 108 (H)  98 - 107 mmol/L Final    Carbon Dioxide 06/23/2022 23  22 - 29 mmol/L Final    Glucose Level 06/23/2022 358 (H)  74 - 100 mg/dL Final    Blood  Urea Nitrogen 06/23/2022 8.4  8.4 - 25.7 mg/dL Final    Creatinine 06/23/2022 0.87  0.73 - 1.18 mg/dL Final    BUN/Creatinine Ratio 06/23/2022 10   Final    Calcium Level Total 06/23/2022 9.2  8.4 - 10.2 mg/dL Final    Estimated GFR- 06/23/2022 >60  mls/min/1.73/m2 Final    Anion Gap 06/23/2022 6.0  mEq/L Final    Magnesium Level 06/23/2022 1.90  1.60 - 2.60 mg/dL Final    Phosphorus Level 06/23/2022 2.7  2.3 - 4.7 mg/dL Final    WBC 06/23/2022 5.6  4.5 - 11.5 x10(3)/mcL Final    RBC 06/23/2022 4.28 (L)  4.70 - 6.10 x10(6)/mcL Final    Hgb 06/23/2022 11.7 (L)  14.0 - 18.0 gm/dL Final    Hct 06/23/2022 36.9 (L)  42.0 - 52.0 % Final    MCV 06/23/2022 86.2  80.0 - 94.0 fL Final    MCH 06/23/2022 27.3  27.0 - 31.0 pg Final    MCHC 06/23/2022 31.7 (L)  33.0 - 36.0 mg/dL Final    RDW 06/23/2022 13.3  11.5 - 17.0 % Final    Platelet 06/23/2022 290  130 - 400 x10(3)/mcL Final    MPV 06/23/2022 10.2  9.4 - 12.4 fL Final    Neut % 06/23/2022 33.0  % Final    Lymph % 06/23/2022 55.3  % Final    Mono % 06/23/2022 7.9  % Final    Eos % 06/23/2022 3.2  % Final    Basophil % 06/23/2022 0.4  % Final    Lymph # 06/23/2022 3.07  0.6 - 4.6 x10(3)/mcL Final    Neut # 06/23/2022 1.8 (L)  2.1 - 9.2 x10(3)/mcL Final    Mono # 06/23/2022 0.44  0.1 - 1.3 x10(3)/mcL Final    Eos # 06/23/2022 0.18  0 - 0.9 x10(3)/mcL Final    Baso # 06/23/2022 0.02  0 - 0.2 x10(3)/mcL Final    IG# 06/23/2022 0.01  0 - 0.0155 x10(3)/mcL Final    IG% 06/23/2022 0.2  0 - 0.43 % Final    NRBC% 06/23/2022 0.0  % Final    POCT Glucose 06/22/2022 336 (H)  70 - 110 mg/dL Final    Troponin-I 06/23/2022 <0.010  0.000 - 0.045 ng/mL Final    POCT Glucose 06/23/2022 220 (H)  70 - 110 mg/dL Final    POCT Glucose 06/23/2022 207 (H)  70 - 110 mg/dL Final    POCT Glucose 06/23/2022 256 (H)  70 - 110 mg/dL Final    Sodium Level 06/24/2022 136  136 - 145 mmol/L Final    Potassium Level 06/24/2022 4.2  3.5 - 5.1 mmol/L Final    Chloride 06/24/2022 106  98 - 107  mmol/L Final    Carbon Dioxide 06/24/2022 22  22 - 29 mmol/L Final    Glucose Level 06/24/2022 221 (H)  74 - 100 mg/dL Final    Blood Urea Nitrogen 06/24/2022 7.3 (L)  8.4 - 25.7 mg/dL Final    Creatinine 06/24/2022 0.74  0.73 - 1.18 mg/dL Final    Calcium Level Total 06/24/2022 9.2  8.4 - 10.2 mg/dL Final    Protein Total 06/24/2022 5.9 (L)  6.4 - 8.3 gm/dL Final    Albumin Level 06/24/2022 3.4 (L)  3.5 - 5.0 gm/dL Final    Globulin 06/24/2022 2.5  2.4 - 3.5 gm/dL Final    Albumin/Globulin Ratio 06/24/2022 1.4  1.1 - 2.0 ratio Final    Bilirubin Total 06/24/2022 0.6  <=1.5 mg/dL Final    Alkaline Phosphatase 06/24/2022 110  40 - 150 unit/L Final    Alanine Aminotransferase 06/24/2022 23  0 - 55 unit/L Final    Aspartate Aminotransferase 06/24/2022 17  5 - 34 unit/L Final    Estimated GFR- 06/24/2022 >60  mls/min/1.73/m2 Final    WBC 06/24/2022 5.0  4.5 - 11.5 x10(3)/mcL Final    RBC 06/24/2022 4.53 (L)  4.70 - 6.10 x10(6)/mcL Final    Hgb 06/24/2022 12.4 (L)  14.0 - 18.0 gm/dL Final    Hct 06/24/2022 39.3 (L)  42.0 - 52.0 % Final    MCV 06/24/2022 86.8  80.0 - 94.0 fL Final    MCH 06/24/2022 27.4  27.0 - 31.0 pg Final    MCHC 06/24/2022 31.6 (L)  33.0 - 36.0 mg/dL Final    RDW 06/24/2022 13.2  11.5 - 17.0 % Final    Platelet 06/24/2022 301  130 - 400 x10(3)/mcL Final    MPV 06/24/2022 10.8  9.4 - 12.4 fL Final    Neut % 06/24/2022 30.5  % Final    Lymph % 06/24/2022 57.9  % Final    Mono % 06/24/2022 5.6  % Final    Eos % 06/24/2022 5.2  % Final    Basophil % 06/24/2022 0.6  % Final    Lymph # 06/24/2022 2.92  0.6 - 4.6 x10(3)/mcL Final    Neut # 06/24/2022 1.5 (L)  2.1 - 9.2 x10(3)/mcL Final    Mono # 06/24/2022 0.28  0.1 - 1.3 x10(3)/mcL Final    Eos # 06/24/2022 0.26  0 - 0.9 x10(3)/mcL Final    Baso # 06/24/2022 0.03  0 - 0.2 x10(3)/mcL Final    IG# 06/24/2022 0.01  0 - 0.0155 x10(3)/mcL Final    IG% 06/24/2022 0.2  0 - 0.43 % Final    NRBC% 06/24/2022 0.0  % Final    Vit D 25 OH 06/24/2022 6.6  (L)  30.0 - 80.0 ng/mL Final    Magnesium Level 06/24/2022 1.70  1.60 - 2.60 mg/dL Final    POCT Glucose 06/24/2022 218 (H)  70 - 110 mg/dL Final    POCT Glucose 06/24/2022 171 (H)  70 - 110 mg/dL Final    POCT Glucose 06/23/2022 287 (H)  70 - 110 mg/dL Final    POCT Glucose 06/24/2022 124 (H)  70 - 110 mg/dL Final   Lab Visit on 06/22/2022   Component Date Value Ref Range Status    Sodium Level 06/22/2022 138  136 - 145 mmol/L Final    Potassium Level 06/22/2022 4.3  3.5 - 5.1 mmol/L Final    Chloride 06/22/2022 106  98 - 107 mmol/L Final    Carbon Dioxide 06/22/2022 21 (L)  22 - 29 mmol/L Final    Glucose Level 06/22/2022 486 (HH)  74 - 100 mg/dL Final    Critical Result called and verified by verbal readback to: spencer kelley r.n. on 06/22/2022 at 15:53. Reported by [id number].    Blood Urea Nitrogen 06/22/2022 10.2  8.4 - 25.7 mg/dL Final    Creatinine 06/22/2022 1.04  0.73 - 1.18 mg/dL Final    Calcium Level Total 06/22/2022 9.7  8.4 - 10.2 mg/dL Final    Protein Total 06/22/2022 7.5  6.4 - 8.3 gm/dL Final    Albumin Level 06/22/2022 3.8  3.5 - 5.0 gm/dL Final    Globulin 06/22/2022 3.7 (H)  2.4 - 3.5 gm/dL Final    Albumin/Globulin Ratio 06/22/2022 1.0 (L)  1.1 - 2.0 ratio Final    Bilirubin Total 06/22/2022 0.4  <=1.5 mg/dL Final    Alkaline Phosphatase 06/22/2022 137  40 - 150 unit/L Final    Alanine Aminotransferase 06/22/2022 29  0 - 55 unit/L Final    Aspartate Aminotransferase 06/22/2022 20  5 - 34 unit/L Final    Estimated GFR- 06/22/2022 >60  mls/min/1.73/m2 Final    Thyroid Stimulating Hormone 06/22/2022 1.0344  0.3500 - 4.9400 uIU/mL Final    Thyroxine Free 06/22/2022 0.87  0.70 - 1.48 ng/dL Final    Hemoglobin A1c 06/22/2022 10.6 (H)  <=7.0 % Final    Estimated Average Glucose 06/22/2022 257.5  mg/dL Final    Prostate Specific Antigen 06/22/2022 0.46  <=4.00 ng/mL Final    Testosterone Total 06/22/2022 88.12 (L)  220.91 - 715.81 ng/dL Final    WBC 06/22/2022 5.8  4.5 - 11.5 x10(3)/mcL  Final    RBC 06/22/2022 5.06  4.70 - 6.10 x10(6)/mcL Final    Hgb 06/22/2022 14.2  14.0 - 18.0 gm/dL Final    Hct 06/22/2022 42.7  42.0 - 52.0 % Final    MCV 06/22/2022 84.4  80.0 - 94.0 fL Final    MCH 06/22/2022 28.1  27.0 - 31.0 pg Final    MCHC 06/22/2022 33.3  33.0 - 36.0 mg/dL Final    RDW 06/22/2022 13.1  11.5 - 17.0 % Final    Platelet 06/22/2022 354  130 - 400 x10(3)/mcL Final    MPV 06/22/2022 10.0  9.4 - 12.4 fL Final    Color, UA 06/22/2022 Yellow  Yellow, Colorless, Other, Clear Final    Appearance, UA 06/22/2022 Clear  Clear Final    Specific Gravity, UA 06/22/2022 1.010   Final    pH, UA 06/22/2022 6.0  5.0, 5.5, 6.0, 6.5, 7.0, 7.5, 8.0, 8.5 Final    Protein, UA 06/22/2022 Negative  Negative, 300  mg/dL Final    Glucose, UA 06/22/2022 >=1000 (A)  Negative, Normal mg/dL Final    Ketones, UA 06/22/2022 Negative  Negative, +1, +2, +3, +4, +5, >=160, >=80 mg/dL Final    Blood, UA 06/22/2022 Negative  Negative unit/L Final    Bilirubin, UA 06/22/2022 Negative  Negative mg/dL Final    Urobilinogen, UA 06/22/2022 0.2  0.2, 1.0, Normal mg/dL Final    Nitrites, UA 06/22/2022 Negative  Negative Final    Leukocyte Esterase, UA 06/22/2022 Negative  Negative, 75  unit/L Final    Neut Man 06/22/2022 33 (L)  47 - 80 % Final    Lymph Man 06/22/2022 53 (H)  13 - 40 % Final    Monocyte Man 06/22/2022 9  2 - 11 % Final    Eos Man 06/22/2022 5  0 - 8 % Final    Abs Neut calc 06/22/2022 1.914 (L)  2.1 - 9.2 x10(3)/mcL Final    Abs Mono 06/22/2022 0.522  0.1 - 1.3 x10(3)/mcL Final    Abs Lymp 06/22/2022 3.074 (L)  0.6 - 4.6 x10(3)/mcL Final    Abs Eos  06/22/2022 0.29  0 - 0.9 x10(3)/mcL Final    Platelet Est 06/22/2022 Adequate  Normal, Adequate Final       Cardiac catheterization  · The pre-procedure left ventricular end diastolic pressure was 7.  · The 1st Mrg lesion was 60% stenosed.  · The estimated blood loss was none.  · There was non-obstructive coronary artery disease..     FINDINGS  The patient has Moderate CAD     Blood loss:  less than 10 cc.    RECOMMENDATIONS  Medical management  Maximize GDMT for patient's cardiomyopathy. Avoid   cardiac toxins.   Risk factor modifications   Activity -- avoid straining with affected limb for one week  Exercise on regular basis.    The procedure log was documented by Documenter: Ayesha Baker RN and   verified by Lev Manuel MD.    Date: 8/1/2022  Time: 8:46 AM       Assessment:     1. Type 2 diabetes mellitus with hyperglycemia, with long-term current use of insulin    2. Mixed diabetic hyperlipidemia associated with type 2 diabetes mellitus    3. Hypertension associated with type 2 diabetes mellitus    4. Primary hypertension    5. Mixed hyperlipidemia    6. Low testosterone    7. Vitamin D deficiency    8. BMI 39.0-39.9,adult    9. Morbid obesity    10. Colon cancer screening      Plan:   I am having Chadwick Lorenzo start on testosterone cypionate. I am also having him maintain his NURTEC, LEVEMIR FLEXTOUCH U-100 INSULN, lancing device, lancets, insulin regular human, blood-glucose meter, CONTOUR NEXT TEST STRIPS, TRULICITY, BD ULTRA-FINE MINI PEN NEEDLE, albuterol, atorvastatin, aspirin, metoprolol succinate, ENTRESTO, ergocalciferol, and metFORMIN. We will continue to administer sodium chloride 0.9%.  Problem List Items Addressed This Visit       Low testosterone (Chronic)    Relevant Medications    testosterone cypionate (DEPOTESTOTERONE CYPIONATE) 100 mg/mL injection    Other Relevant Orders    Testosterone    BMI 39.0-39.9,adult (Chronic)    Hypertension associated with type 2 diabetes mellitus (Chronic)    Relevant Medications    metFORMIN (GLUCOPHAGE) 500 MG tablet    Other Relevant Orders    Hemoglobin A1C    Comprehensive Metabolic Panel    Lipid Panel    Mixed diabetic hyperlipidemia associated with type 2 diabetes mellitus    Relevant Medications    metFORMIN (GLUCOPHAGE) 500 MG tablet    Type 2 diabetes mellitus with hyperglycemia, with long-term current use of  insulin - Primary    Relevant Medications    metFORMIN (GLUCOPHAGE) 500 MG tablet    Other Relevant Orders    Hemoglobin A1C    Comprehensive Metabolic Panel    Lipid Panel    Colon cancer screening    Relevant Orders    Ambulatory referral/consult to Gastroenterology    Morbid obesity    Primary hypertension    Mixed hyperlipidemia    Vitamin D deficiency    Relevant Medications    ergocalciferol (ERGOCALCIFEROL) 50,000 unit Cap     Follow up in about 6 months (around 4/4/2023).    Chadwick was seen today for follow-up.    Diagnoses and all orders for this visit:    Type 2 diabetes mellitus with hyperglycemia, with long-term current use of insulin  -     metFORMIN (GLUCOPHAGE) 500 MG tablet; Take 1 tablet (500 mg total) by mouth 2 (two) times daily with meals.  -     Hemoglobin A1C; Future  -     Comprehensive Metabolic Panel; Future  -     Lipid Panel; Future  - A1c significantly improved, no longer needs insulin (will change chart to reflect this).   Continue current prescription medications. Refills as needed   Condition/Symptoms controlled/stable   Surveillance labs ordered as needed, or reviewed in visit.   RTC 6 months (as scheduled) or PRN    Mixed diabetic hyperlipidemia associated with type 2 diabetes mellitus  -     metFORMIN (GLUCOPHAGE) 500 MG tablet; Take 1 tablet (500 mg total) by mouth 2 (two) times daily with meals.    Continue current prescription medications. Refills as needed    Condition/Symptoms controlled/stable    Surveillance labs ordered as needed, or reviewed in visit.    RTC 6 months (as scheduled) or PRN    Hypertension associated with type 2 diabetes mellitus  -     metFORMIN (GLUCOPHAGE) 500 MG tablet; Take 1 tablet (500 mg total) by mouth 2 (two) times daily with meals.  -     Hemoglobin A1C; Future  -     Comprehensive Metabolic Panel; Future  -     Lipid Panel; Future   Continue current prescription medications. Refills as needed   Condition/Symptoms controlled/stable   Surveillance  labs ordered as needed, or reviewed in visit.   RTC 6 months (as scheduled) or PRN    Primary hypertension   Continue current prescription medications. Refills as needed   Condition/Symptoms controlled/stable   Surveillance labs ordered as needed, or reviewed in visit.   RTC 6 months (as scheduled) or PRN    Mixed hyperlipidemia   Continue current prescription medications. Refills as needed   Condition/Symptoms controlled/stable   Surveillance labs ordered as needed, or reviewed in visit.   RTC 6 months (as scheduled) or PRN    Low testosterone  -     testosterone cypionate (DEPOTESTOTERONE CYPIONATE) 100 mg/mL injection; Inject 1 mL (100 mg total) into the muscle every 14 (fourteen) days. for 5 doses  -     Testosterone; Future    Vitamin D deficiency  -     ergocalciferol (ERGOCALCIFEROL) 50,000 unit Cap; Take 1 capsule (50,000 Units total) by mouth every 7 days.    BMI 39.0-39.9,adult  Morbid obesity  Documented for chart completeness and HCC    Colon cancer screening  -     Ambulatory referral/consult to Gastroenterology; Future      Medications Ordered This Encounter   Medications    ergocalciferol (ERGOCALCIFEROL) 50,000 unit Cap     Sig: Take 1 capsule (50,000 Units total) by mouth every 7 days.     Dispense:  4 capsule     Refill:  5    metFORMIN (GLUCOPHAGE) 500 MG tablet     Sig: Take 1 tablet (500 mg total) by mouth 2 (two) times daily with meals.     Dispense:  60 tablet     Refill:  3    testosterone cypionate (DEPOTESTOTERONE CYPIONATE) 100 mg/mL injection     Sig: Inject 1 mL (100 mg total) into the muscle every 14 (fourteen) days. for 5 doses     Dispense:  5 mL     Refill:  0     Please also dispense syringes and needles necessary for patient to self-administer.     [unfilled]  Orders Placed This Encounter   Procedures    Testosterone     Standing Status:   Future     Standing Expiration Date:   12/3/2023    Hemoglobin A1C     Standing Status:   Future     Standing Expiration Date:   7/4/2023     "Comprehensive Metabolic Panel     Standing Status:   Future     Standing Expiration Date:   7/4/2023    Lipid Panel     Standing Status:   Future     Standing Expiration Date:   7/4/2023    Ambulatory referral/consult to Gastroenterology     Standing Status:   Future     Standing Expiration Date:   11/4/2023     Referral Priority:   Routine     Referral Type:   Consultation     Referral Reason:   Specialty Services Required     Requested Specialty:   Gastroenterology     Number of Visits Requested:   1       Medication List with Changes/Refills   New Medications    TESTOSTERONE CYPIONATE (DEPOTESTOTERONE CYPIONATE) 100 MG/ML INJECTION    Inject 1 mL (100 mg total) into the muscle every 14 (fourteen) days. for 5 doses   Current Medications    ALBUTEROL (PROVENTIL/VENTOLIN HFA) 90 MCG/ACTUATION INHALER    Inhale 2 puffs into the lungs daily as needed for Shortness of Breath.    ASPIRIN (ECOTRIN) 81 MG EC TABLET    Take 1 tablet (81 mg total) by mouth once daily.    ATORVASTATIN (LIPITOR) 40 MG TABLET    Take 1 tablet (40 mg total) by mouth once daily.    BD ULTRA-FINE MINI PEN NEEDLE 31 GAUGE X 3/16" NDLE    SMARTSIG:Pre-Filled Pen Syringe SUB-Q As Directed    BLOOD-GLUCOSE METER MISC    1 Device by Misc.(Non-Drug; Combo Route) route 3 (three) times daily before meals.    CONTOUR NEXT TEST STRIPS STRP    USE AS DIRECTED THREE TIMES DAILY BEFORE MEALS    DULAGLUTIDE (TRULICITY) 0.75 MG/0.5 ML PEN INJECTOR    Inject 0.75 mg into the skin every 7 days.    INSULIN DETEMIR U-100 (LEVEMIR FLEXTOUCH U-100 INSULN) 100 UNIT/ML (3 ML) INPN PEN    Inject 40 Units into the skin every evening.    INSULIN REGULAR HUMAN (HUMULIN R) 100 UNIT/ML (3 ML) INPN PEN    Inject 6 Units into the skin 3 (three) times daily before meals.    LANCETS MISC    1 lancet by Misc.(Non-Drug; Combo Route) route 3 (three) times daily before meals.    LANCING DEVICE MISC    1 Device by Misc.(Non-Drug; Combo Route) route 3 (three) times daily before meals. " "   METOPROLOL SUCCINATE (TOPROL-XL) 25 MG 24 HR TABLET    Take 1 tablet (25 mg total) by mouth once daily.    NURTEC 75 MG ODT    Take 75 mg by mouth daily as needed.    SACUBITRIL-VALSARTAN (ENTRESTO) 24-26 MG PER TABLET    Take 1 tablet by mouth 2 (two) times daily.   Changed and/or Refilled Medications    Modified Medication Previous Medication    ERGOCALCIFEROL (ERGOCALCIFEROL) 50,000 UNIT CAP ergocalciferol (ERGOCALCIFEROL) 50,000 unit Cap       Take 1 capsule (50,000 Units total) by mouth every 7 days.    Take 1 capsule (50,000 Units total) by mouth every 7 days.    METFORMIN (GLUCOPHAGE) 500 MG TABLET metFORMIN (GLUCOPHAGE) 500 MG tablet       Take 1 tablet (500 mg total) by mouth 2 (two) times daily with meals.    Take 1 tablet (500 mg total) by mouth 2 (two) times daily with meals.      Medication List with Changes/Refills   New Medications    TESTOSTERONE CYPIONATE (DEPOTESTOTERONE CYPIONATE) 100 MG/ML INJECTION    Inject 1 mL (100 mg total) into the muscle every 14 (fourteen) days. for 5 doses       Start Date: 10/4/2022 End Date: 11/30/2022   Current Medications    ALBUTEROL (PROVENTIL/VENTOLIN HFA) 90 MCG/ACTUATION INHALER    Inhale 2 puffs into the lungs daily as needed for Shortness of Breath.       Start Date: 8/1/2022  End Date: --    ASPIRIN (ECOTRIN) 81 MG EC TABLET    Take 1 tablet (81 mg total) by mouth once daily.       Start Date: 9/8/2022  End Date: 9/8/2023    ATORVASTATIN (LIPITOR) 40 MG TABLET    Take 1 tablet (40 mg total) by mouth once daily.       Start Date: 9/8/2022  End Date: 9/8/2023    BD ULTRA-FINE MINI PEN NEEDLE 31 GAUGE X 3/16" NDLE    SMARTSIG:Pre-Filled Pen Syringe SUB-Q As Directed       Start Date: 6/24/2022 End Date: --    BLOOD-GLUCOSE METER MISC    1 Device by Misc.(Non-Drug; Combo Route) route 3 (three) times daily before meals.       Start Date: 6/24/2022 End Date: --    CONTOUR NEXT TEST STRIPS STRP    USE AS DIRECTED THREE TIMES DAILY BEFORE MEALS       Start Date: " 7/21/2022 End Date: --    DULAGLUTIDE (TRULICITY) 0.75 MG/0.5 ML PEN INJECTOR    Inject 0.75 mg into the skin every 7 days.       Start Date: 7/20/2022 End Date: 10/18/2022    INSULIN DETEMIR U-100 (LEVEMIR FLEXTOUCH U-100 INSULN) 100 UNIT/ML (3 ML) INPN PEN    Inject 40 Units into the skin every evening.       Start Date: 6/24/2022 End Date: 7/24/2022    INSULIN REGULAR HUMAN (HUMULIN R) 100 UNIT/ML (3 ML) INPN PEN    Inject 6 Units into the skin 3 (three) times daily before meals.       Start Date: 6/24/2022 End Date: --    LANCETS MISC    1 lancet by Misc.(Non-Drug; Combo Route) route 3 (three) times daily before meals.       Start Date: 6/24/2022 End Date: --    LANCING DEVICE MISC    1 Device by Misc.(Non-Drug; Combo Route) route 3 (three) times daily before meals.       Start Date: 6/24/2022 End Date: --    METOPROLOL SUCCINATE (TOPROL-XL) 25 MG 24 HR TABLET    Take 1 tablet (25 mg total) by mouth once daily.       Start Date: 9/8/2022  End Date: 9/8/2023    NURTEC 75 MG ODT    Take 75 mg by mouth daily as needed.       Start Date: 1/14/2022 End Date: --    SACUBITRIL-VALSARTAN (ENTRESTO) 24-26 MG PER TABLET    Take 1 tablet by mouth 2 (two) times daily.       Start Date: 9/8/2022  End Date: 9/8/2023   Changed and/or Refilled Medications    Modified Medication Previous Medication    ERGOCALCIFEROL (ERGOCALCIFEROL) 50,000 UNIT CAP ergocalciferol (ERGOCALCIFEROL) 50,000 unit Cap       Take 1 capsule (50,000 Units total) by mouth every 7 days.    Take 1 capsule (50,000 Units total) by mouth every 7 days.       Start Date: 10/4/2022 End Date: 4/2/2023    Start Date: 7/1/2022  End Date: 10/4/2022    METFORMIN (GLUCOPHAGE) 500 MG TABLET metFORMIN (GLUCOPHAGE) 500 MG tablet       Take 1 tablet (500 mg total) by mouth 2 (two) times daily with meals.    Take 1 tablet (500 mg total) by mouth 2 (two) times daily with meals.       Start Date: 10/4/2022 End Date: --    Start Date: 8/1/2022  End Date: 10/4/2022

## 2022-10-05 ENCOUNTER — TELEPHONE (OUTPATIENT)
Dept: FAMILY MEDICINE | Facility: CLINIC | Age: 57
End: 2022-10-05

## 2022-10-05 DIAGNOSIS — R79.89 LOW TESTOSTERONE: Chronic | ICD-10-CM

## 2022-10-05 RX ORDER — TESTOSTERONE CYPIONATE 1000 MG/10ML
100 INJECTION, SOLUTION INTRAMUSCULAR
Qty: 10 ML | Refills: 0 | Status: SHIPPED | OUTPATIENT
Start: 2022-10-05 | End: 2023-05-16

## 2022-10-05 NOTE — TELEPHONE ENCOUNTER
Corrected Rx Sent -- BERNIE      I have signed for the following orders AND/OR meds.  Please call the patient and ask the patient to schedule the testing AND/OR inform about any medications that were sent.      No orders of the defined types were placed in this encounter.      Medications Ordered This Encounter   Medications    testosterone cypionate (DEPOTESTOTERONE CYPIONATE) 100 mg/mL injection     Sig: Inject 1 mL (100 mg total) into the muscle every 14 (fourteen) days. for 10 doses     Dispense:  10 mL     Refill:  0     Please also dispense syringes and needles necessary for patient to self-administer.

## 2022-10-05 NOTE — TELEPHONE ENCOUNTER
----- Message from Chiara Mccann MA sent at 10/5/2022  4:08 PM CDT -----  Regarding: Testosterone  Pharmacy called to inform us that his testersone was written for 5ml. All the pharmacy has is 10ml. Please advise on best course of action.  --LB

## 2022-10-06 ENCOUNTER — CLINICAL SUPPORT (OUTPATIENT)
Dept: DIABETES | Facility: CLINIC | Age: 57
End: 2022-10-06
Payer: COMMERCIAL

## 2022-10-06 ENCOUNTER — CLINICAL SUPPORT (OUTPATIENT)
Dept: CARDIOLOGY | Facility: CLINIC | Age: 57
End: 2022-10-06
Payer: COMMERCIAL

## 2022-10-06 ENCOUNTER — TELEPHONE (OUTPATIENT)
Dept: DIABETES | Facility: CLINIC | Age: 57
End: 2022-10-06

## 2022-10-06 VITALS
RESPIRATION RATE: 18 BRPM | HEIGHT: 68 IN | OXYGEN SATURATION: 95 % | HEART RATE: 62 BPM | SYSTOLIC BLOOD PRESSURE: 134 MMHG | DIASTOLIC BLOOD PRESSURE: 76 MMHG | WEIGHT: 258 LBS | BODY MASS INDEX: 39.1 KG/M2

## 2022-10-06 VITALS — WEIGHT: 262.81 LBS | BODY MASS INDEX: 39.83 KG/M2 | HEIGHT: 68 IN

## 2022-10-06 DIAGNOSIS — E11.65 TYPE 2 DIABETES MELLITUS WITH HYPERGLYCEMIA, UNSPECIFIED WHETHER LONG TERM INSULIN USE: Primary | ICD-10-CM

## 2022-10-06 PROCEDURE — G0108 PR DIAB MANAGE TRN  PER INDIV: ICD-10-PCS | Mod: ,,, | Performed by: INTERNAL MEDICINE

## 2022-10-06 PROCEDURE — 99214 OFFICE O/P EST MOD 30 MIN: CPT | Mod: PBBFAC

## 2022-10-06 PROCEDURE — 99211 OFF/OP EST MAY X REQ PHY/QHP: CPT | Mod: S$PBB,,, | Performed by: NURSE PRACTITIONER

## 2022-10-06 PROCEDURE — G0108 DIAB MANAGE TRN  PER INDIV: HCPCS | Mod: ,,, | Performed by: INTERNAL MEDICINE

## 2022-10-06 PROCEDURE — 99211 PR OFFICE/OUTPT VISIT, EST, LEVL I: ICD-10-PCS | Mod: S$PBB,,, | Performed by: NURSE PRACTITIONER

## 2022-10-06 NOTE — PROGRESS NOTES
Pt presents to cardio clinic for BP check, evaluation for possible increase in Entresto. Pt states he is feeling fine, no new/worsening symptoms since last visit. He reports compliance with all medications. Today's vitals WNL. Home BP log reveals systolic pressures consistently around lower 100s, diastolic around mid 60s-70s. HR remains in the low 60s. Case presented to Paulina Dyson NP who will have pt continue current regimen, no changes today. Pt to continue tracking vitals at home and call with any issues. Pt given instructions and follow ups, he verbalizes understanding.

## 2022-10-06 NOTE — PROGRESS NOTES
Diabetes Care Specialist Progress Note   Author: Phuong Gutierrez RN  Date: 10/6/2022    Program Intake  Reason for Diabetes Program Visit:: Post Program Follow-Up  Type of Intervention:: Individual  Individual: Education  Education: Self-Management Skill Review  Type of Follow-Up: 3 month  Current diabetes risk level:: low    Lab Results   Component Value Date    HGBA1C 6.0 09/20/2022       Clinical                        Labs  Do you have regular lab work to monitor your medications?: Yes  Type of Regular Lab Work: A1c  Lab Compliance Barriers: No         Additional Social History              Cognitive/Behavioral Health  Cognitive or behavioral barriers impacting ability to self-manage diabetes?: No              Health Literacy  Preferred Learning Method: Face to Face, Web Based      Diabetes Self-Management Skills Assessment    Diabetes Disease Process/Treatment Options  Patient/caregiver knows what type of diabetes they have.: yes  Diabetes Type : Type II  Patient able to identify at least three risk factors for diabetes.: yes  Identified risk factors:: being overweight, age over 40  Assessment indicates:: Adequate understanding  Area of need?: No    Nutrition/Healthy Eating  Patient can identify foods that impact blood sugar.: yes  Patient-identified foods:: starches (bread, pasta, rice, cereal), sweets  Assessment indicates:: Adequate understanding  Area of need?: No    Physical Activity/Exercise  Patient's daily activity level:: lightly active  Patient formally exercises outside of work.: no  Reasons for not exercising:: work schedule, other (see comments) (walks all day at work)  Intensity: Low  Patient can identify reasons why exercise/physical activity is important in diabetes management.: yes  Identified reasons:: lowers blood glucose, blood pressure, and cholesterol  Physical Activity/Exercise Skills Assessment Completed: : Yes  Assessment indicates:: Adequate understanding  Area of need?:  No    Medications  Diabetes management routine:: oral medications, injectable medications (insulin stopped, taking metformin 500mg bid, Trulicity 0.75mg every Tuesday)  Assessment indicates:: Adequate understanding  Area of need?: No    Home Blood Glucose Monitoring  Patient states that blood sugar is checked at home daily.: no  Monitoring Method:: home glucometer  Reasons for not monitoring:: other (see comments) (Glucose under control so does spot testing)  How often do you check your blood sugar?: Occasionally  When you check what is your typical blood sugar range? : am is 90 and 2 hours after supper is always less than 180  Blood glucose logs:: no  Blood glucose logs reviewed today?: no         Chronic Complications  Chronic Complications Skills Assessment Completed: : Yes  Assessment indicates:: Adequate understanding  Area of need?: No           Diabetes Self Support Plan         Assessment Summary and Plan    Based on today's diabetes care assessment, the following areas of need were identified:      Social 10/6/2022   Support -   Access to Mass Media/Tech -   Cognitive/Behavioral Health No   Culture/Jain -   Communication -   Health Literacy -        Clinical 10/6/2022   Medication Adherence -   Lab Compliance No   Nutritional Status -        Diabetes Self-Management Skills 10/6/2022   Diabetes Disease Process/Treatment Options No   Nutrition/Healthy Eating No   Physical Activity/Exercise No   Medication No   Home Blood Glucose Monitoring -   Acute Complications -   Chronic Complications No   Psychosocial/Coping -          Today's interventions were provided through individual discussion, instruction, and written materials were provided.      Patient verbalized understanding of instruction and written materials.  Pt was able to return back demonstration of instructions today. Patient understood key points, needs reinforcement and further instruction.     Diabetes Self-Management Care Plan:    Today's  Diabetes Self-Management Care Plan was developed with Chadwick's input. Chadwick has agreed to work toward the following goal(s) to improve his/her overall diabetes control.            Follow Up Plan     Follow up if symptoms worsen or fail to improve. Chadwick states his education needs are met and will f/u as needed.     Today's care plan and follow up schedule was discussed with patient.  Chadwick verbalized understanding of the care plan, goals, and agrees to follow up plan.        The patient was encouraged to communicate with his/her health care provider/physician and care team regarding his/her condition(s) and treatment.  I provided the patient with my contact information today and encouraged to contact me via phone or [facility] Patient Portal as needed.     Length of Visit   Total Time: 30 Minutes

## 2022-10-07 ENCOUNTER — TELEPHONE (OUTPATIENT)
Dept: FAMILY MEDICINE | Facility: CLINIC | Age: 57
End: 2022-10-07
Payer: COMMERCIAL

## 2022-10-07 ENCOUNTER — TELEPHONE (OUTPATIENT)
Dept: GASTROENTEROLOGY | Facility: CLINIC | Age: 57
End: 2022-10-07
Payer: COMMERCIAL

## 2022-10-07 DIAGNOSIS — R79.89 LOW TESTOSTERONE: Chronic | ICD-10-CM

## 2022-10-07 RX ORDER — TESTOSTERONE CYPIONATE 200 MG/ML
100 INJECTION, SOLUTION INTRAMUSCULAR
Qty: 10 ML | Refills: 0 | Status: SHIPPED | OUTPATIENT
Start: 2022-10-07 | End: 2023-01-13 | Stop reason: SDUPTHER

## 2022-10-07 NOTE — TELEPHONE ENCOUNTER
Hello,    I receieved a referral on this PT for a screening colonoscopy.  Due to our back log of referrals, we are currently on diversion for screening procedures.  Please order a FIT test or Cologuard (if meets criteria) on PT then refer back if Positive.      You may also refer this patient to an external GI provider for screening procedure.  Please be sure to fax referrals manually as they do not use Epic for referrals.    Please let me know if you have any questions.    For Medicaid patients refer to:  Dr. Jeronimo or Dr. Gurpreet Forrest    For Medicare, commercial patients refer to:  [facility] or Gastro clinic    Thank you,    GI Dept. [facility]

## 2022-10-07 NOTE — TELEPHONE ENCOUNTER
----- Message from Lesia Segundo sent at 10/7/2022 10:38 AM CDT -----  Regarding: pharmacy call  Type:  Pharmacy Calling to Clarify an RX    Name of Caller:merari  [facility]arm[facility] Name:[facility] [address]  Prescription Name:testosterone cypionate (DEPOTESTOTERONE CYPIONATE) 100 mg/mL injection  What do they need to clarify?:  Best Call Back Number:[phone]  Additional Information: [facility] called about pts insurance not covering the 100mg meds, but she stated that the 200mg/ml with .5cc injection should be covered

## 2022-10-07 NOTE — TELEPHONE ENCOUNTER
I have signed for the following orders AND/OR meds.  Please call the patient and ask the patient to schedule the testing AND/OR inform about any medications that were sent.      No orders of the defined types were placed in this encounter.      Medications Ordered This Encounter   Medications    testosterone cypionate (DEPOTESTOTERONE CYPIONATE) 200 mg/mL injection     Sig: Inject 0.5 mLs (100 mg total) into the muscle every 14 (fourteen) days.     Dispense:  10 mL     Refill:  0

## 2022-10-19 ENCOUNTER — TELEPHONE (OUTPATIENT)
Dept: FAMILY MEDICINE | Facility: CLINIC | Age: 57
End: 2022-10-19
Payer: COMMERCIAL

## 2022-10-19 DIAGNOSIS — R79.89 LOW TESTOSTERONE: Primary | Chronic | ICD-10-CM

## 2022-10-19 NOTE — TELEPHONE ENCOUNTER
----- Message from Diane Maciel LPN sent at 10/19/2022  4:28 PM CDT -----  Regarding: FW: new rx request    ----- Message -----  From: Lynnette Robledo  Sent: 10/19/2022   4:04 PM CDT  To: Juanpablo Wells Staff  Subject: new rx request                                   Type:  RX Refill Request    Who Called: Chadwick  Refill or New Rx:new   RX Name and Strength:  How is the patient currently taking it? (ex. 1XDay):  Is this a 30 day or 90 day RX:  Preferred Pharmacy with phone number:[facility] [address]  Local or Mail Order:local  Ordering Provider:Asif  Would the patient rather a call back or a response via [facility]?   Best Call Back Number:[phone]  Additional Information: Patient received prescription of his injection medication but was not given a rx for any needles. Patient states he's unable to give himself the medication without a syringe. Please call patient back to confirm refill.

## 2022-10-19 NOTE — TELEPHONE ENCOUNTER
"I have signed for the following orders AND/OR meds.  Please call the patient and ask the patient to schedule the testing AND/OR inform about any medications that were sent.      No orders of the defined types were placed in this encounter.      Medications Ordered This Encounter   Medications    syringe with needle, safety 3 mL 23 gauge x 1" Syrg     Si each by Misc.(Non-Drug; Combo Route) route as needed (to administer testosterone injections IM).     Dispense:  25 each     Refill:  0     Dispense syringes as needed for testosterone injection administration (dispense 1 box) - -JA     "

## 2022-11-14 ENCOUNTER — TELEPHONE (OUTPATIENT)
Dept: OPHTHALMOLOGY | Facility: CLINIC | Age: 57
End: 2022-11-14
Payer: COMMERCIAL

## 2022-11-14 NOTE — TELEPHONE ENCOUNTER
11/14/2022 10:54 AM  Called Dr. Grant's office to check on referral. His office did receive referral. They scheduled patient's appointment for 11/01/2022 but patient canceled. Called patient he states he plans to call them back to reschedule. I made sure that the patient had the correct phone number to call them back.

## 2022-11-16 ENCOUNTER — TELEPHONE (OUTPATIENT)
Dept: OPHTHALMOLOGY | Facility: CLINIC | Age: 57
End: 2022-11-16
Payer: COMMERCIAL

## 2022-11-16 NOTE — TELEPHONE ENCOUNTER
Returned call to Dr. Grant's office, Selina wanted to inform provider that patient had an appointment 11/1/22 and he cancelled it.

## 2022-11-29 ENCOUNTER — OFFICE VISIT (OUTPATIENT)
Dept: URGENT CARE | Facility: CLINIC | Age: 57
End: 2022-11-29
Payer: COMMERCIAL

## 2022-11-29 VITALS
OXYGEN SATURATION: 96 % | WEIGHT: 271.63 LBS | TEMPERATURE: 98 F | SYSTOLIC BLOOD PRESSURE: 119 MMHG | HEIGHT: 68 IN | HEART RATE: 76 BPM | RESPIRATION RATE: 18 BRPM | DIASTOLIC BLOOD PRESSURE: 81 MMHG | BODY MASS INDEX: 41.17 KG/M2

## 2022-11-29 DIAGNOSIS — U07.1 COVID-19 VIRUS DETECTED: ICD-10-CM

## 2022-11-29 DIAGNOSIS — R68.89 FLU-LIKE SYMPTOMS: ICD-10-CM

## 2022-11-29 DIAGNOSIS — Z11.52 ENCOUNTER FOR SCREENING FOR COVID-19: Primary | ICD-10-CM

## 2022-11-29 DIAGNOSIS — U07.1 COVID-19: ICD-10-CM

## 2022-11-29 LAB
CTP QC/QA: YES
CTP QC/QA: YES
FLUAV AG NPH QL: NEGATIVE
FLUBV AG NPH QL: NEGATIVE
SARS-COV-2 RDRP RESP QL NAA+PROBE: POSITIVE

## 2022-11-29 PROCEDURE — 99214 PR OFFICE/OUTPT VISIT, EST, LEVL IV, 30-39 MIN: ICD-10-PCS | Mod: S$PBB,,, | Performed by: FAMILY MEDICINE

## 2022-11-29 PROCEDURE — 99215 OFFICE O/P EST HI 40 MIN: CPT | Mod: PBBFAC | Performed by: FAMILY MEDICINE

## 2022-11-29 PROCEDURE — 99214 OFFICE O/P EST MOD 30 MIN: CPT | Mod: S$PBB,,, | Performed by: FAMILY MEDICINE

## 2022-11-29 PROCEDURE — 87635 SARS-COV-2 COVID-19 AMP PRB: CPT | Mod: PBBFAC | Performed by: FAMILY MEDICINE

## 2022-11-29 PROCEDURE — 87804 INFLUENZA ASSAY W/OPTIC: CPT | Mod: PBBFAC | Performed by: FAMILY MEDICINE

## 2022-11-29 NOTE — PROGRESS NOTES
"Subjective:       Patient ID: Chadwick Lorenzo is a 57 y.o. male.    Vitals:  height is 5' 7.99" (1.727 m) and weight is 123.2 kg (271 lb 9.6 oz). His oral temperature is 98.1 °F (36.7 °C). His blood pressure is 119/81 and his pulse is 76. His respiration is 18 and oxygen saturation is 96%.     Chief Complaint: Fatigue, Cough, Nasal Congestion, Eye Pain, and Dizziness    Fatigue  Associated symptoms include coughing and fatigue.   Cough    Eye Pain         Patient with 2 days of fatigue, cough without shortness of breath, clear rhinorrhea, occasional dizziness.  No chest pain.  Patient is a nurse in well known to me.  Chart reviewed    Constitution: Positive for fatigue.   Eyes:  Positive for eye pain.   Respiratory:  Positive for cough.    Neurological:  Positive for dizziness.     Constitutional: negative except as stated in HPI  Eye: negative except as stated in HPI  ENT: negative except as stated in HPI  Respiratory: negative except as stated in HPI  Cardiovascular: negative except as stated in HPI  Gastrointestinal: negative except as stated in HPI  Genitourinary: negative except as stated in HPI  Objective:      Physical Exam   Constitutional: He appears well-developed.   HENT:   Head: Atraumatic.   Nose: Rhinorrhea present. No purulent discharge. Right sinus exhibits no maxillary sinus tenderness and no frontal sinus tenderness. Left sinus exhibits no maxillary sinus tenderness and no frontal sinus tenderness.   Mouth/Throat: Oropharynx is clear and moist.   Eyes: Conjunctivae are normal.   Neck: Neck supple.   Cardiovascular: Regular rhythm.   Pulmonary/Chest: Effort normal and breath sounds normal. No respiratory distress. He has no wheezes. He has no rhonchi. He has no rales.   Lymphadenopathy:     He has no cervical adenopathy.   Neurological: He is alert.   Skin: Skin is warm and dry.   Nursing note and vitals reviewed.      Results for orders placed or performed in visit on 11/29/22   POCT COVID-19 " Rapid Screening   Result Value Ref Range    POC Rapid COVID Positive (A) Negative     Acceptable Yes    POCT Influenza A/B   Result Value Ref Range    Rapid Influenza A Ag Negative Negative    Rapid Influenza B Ag Negative Negative     Acceptable Yes        Assessment:       1. Encounter for screening for COVID-19    2. Flu-like symptoms    3. COVID-19            Plan:         Encounter for screening for COVID-19  -     POCT COVID-19 Rapid Screening    Flu-like symptoms  -     POCT Influenza A/B    COVID-19    Other orders  -     nirmatrelvir-ritonavir 300 mg (150 mg x 2)-100 mg copackaged tablets (EUA); Take 3 tablets by mouth 2 (two) times daily for 5 days. Each dose contains 2 nirmatrelvir (pink tablets) and 1 ritonavir (white tablet). Take all 3 tablets together  Dispense: 30 tablet; Refill: 0         Chart reviewed.  Labs reviewed.  COVID risk score is 6  Had a lengthy discussion about Paxlovid.  Patient is considering it.  Printed a prescription for normal dose Paxlovid.  Patient understands that he must hold atorvastatin as directed.  There is also potential hypotensive effect of Entresto.  Chadwick will call his cardiologist tomorrow and get recommendations.  Call me directly if any need.

## 2022-11-29 NOTE — LETTER
November 29, 2022      [facility]  [facility] [facility] [address]  Phone: [phone]       Patient: Chadwick Lorenzo   YOB: 1965  Date of Visit: 11/29/2022    To Whom It May Concern:    Luzmaria Lorenzo  was at [facility] on 11/29/2022. The patient may return to work/school on 12/05/2022 with no restrictions. If you have any questions or concerns, or if I can be of further assistance, please do not hesitate to contact me.    Sincerely,    Pablo Morgan MD

## 2022-11-29 NOTE — PATIENT INSTRUCTIONS
If you decide to take Paxlovid, it potentially has interactions with some of your other medications.    As we discussed, you should hold atorvastatin.  You can restart atorvastatin 3 days after finishing Paxlovid.    Call your cardiologist and discuss the potential interaction of Entresto and Paxlovid.  As we discussed, this can lower your blood pressure.    Avoid taking migraine medication while on Paxlovid.

## 2022-11-30 ENCOUNTER — NURSE TRIAGE (OUTPATIENT)
Dept: ADMINISTRATIVE | Facility: CLINIC | Age: 57
End: 2022-11-30
Payer: COMMERCIAL

## 2022-11-30 NOTE — TELEPHONE ENCOUNTER
Pt. Responded to [facility] text message. Pt is [employer] employee states that he has to email COVID test results but unsure who to send email too. Employee health phone number given to pt.  Reason for Disposition   Information only question and nurse able to answer    Protocols used: Information Only Call - No Triage-A-OH

## 2022-12-07 NOTE — PROGRESS NOTES
Cardiac risk stratification  According to the revised cardiac risk index, the patient is considered at least moderate risk for cardiac events for low risk colonoscopy procedure.  Recommend to continue with beta-blocker during perioperative period and monitor fluid resuscitation.         CHIEF COMPLAINT:   Chief Complaint   Patient presents with    3mt f/u - pt wants to know how long he needs to be on entre                     Review of patient's allergies indicates:  No Known Allergies                                       HPI:  Chadwick Lorenzo 57 y.o. male with HFrEF/NICMO with LVEF 25% (TTE 6.23.22 ), DM II, IGT, who presents to cardiology clinic for follow-up and ongoing care.  The patient completed an echocardiogram June 2022 that revealed severely decreased systolic function, with estimated ejection fraction 25% (see report below).  He underwent a subsequent cardiac catheterization on 08/01/2022 that revealed moderate CAD, 60%  to 1st Mrg lesion.     Today patient reports that he feels good.  He denies any exertional dyspnea, orthopnea, PND, peripheral edema or syncope.  The patient reports that he is able to perform his regular activities and job duties without any chest pain or shortness of breath.  He continues to endorse lifestyle changes, including diet and exercise.  He reports compliance with his current medications.  Of note, the patient states that he was recently diagnosed COVID in 11.29.22.         CARDIAC TESTING:    Echo 6.22.22    Interpretation Summary  · Concentric hypertrophy and severely decreased systolic function.  · The estimated ejection fraction is 25%.  · Grade I left ventricular diastolic dysfunction.  · Mild aortic regurgitation.  · Normal right ventricular size with normal right ventricular systolic function.  · Intermediate central venous pressure (8 mmHg).  · The estimated PA systolic pressure is 16 mmHg.  · There is severe left ventricular global hypokinesis.    Cardiac  catheterization 8.1.22    Conclusion  · The pre-procedure left ventricular end diastolic pressure was 7.  · The 1st Mrg lesion was 60% stenosed.  · The estimated blood loss was none.  · There was non-obstructive coronary artery disease..    FINDINGS  The patient has Moderate CAD  Blood loss:  less than 10 cc.    RECOMMENDATIONS  Medical management  Maximize GDMT for patient's cardiomyopathy. Avoid cardiac toxins.  Risk factor modifications  Activity -- avoid straining with affected limb for one week  Exercise on regular basis.    EKG done 6/23  Normal sinus rhythm, rate 60. Normal ECG       Past Medical History:   has a past medical history of CHF (congestive heart failure), Diabetes mellitus, type 2, IGT (impaired glucose tolerance), Kidney stones, Low testosterone in male, Migraine, Morbid obesity, and Vitamin D deficiency. HLD    Past Surgical History:   has a past surgical history that includes rotator cuff surgery and kidney stones removal.     Family History:  family history includes Cancer in his mother; Heart disease in his father.     Social History:   reports that he has never smoked. He has never used smokeless tobacco. He reports previous alcohol use. He reports that he does not use drugs.                                                                                                                                                                                                                                                                                         Patient Active Problem List   Diagnosis    Nocturia    Dizziness    Fatigue    IGT (impaired glucose tolerance)    Chest pain    Asthma    Low testosterone    Mixed diabetic hyperlipidemia associated with type 2 diabetes mellitus    Type 2 diabetes mellitus with hyperglycemia, with long-term current use of insulin    Cardiomyopathy    BMI 39.0-39.9,adult    Colon cancer screening    FH: colon cancer in relative diagnosed at >50 years old    Hx  of non anemic vitamin B12 deficiency    Immunization due    Thunderclap headache    Headache    Migraine with aura    Mild intermittent asthma    Morbid obesity    Seasonal allergies    Severe acute respiratory syndrome coronavirus 2 (SARS-CoV-2) vaccination not indicated    Long-term insulin use in type 2 diabetes    Primary hypertension    Mixed hyperlipidemia    Vitamin D deficiency    Angina of effort    Hypertension associated with type 2 diabetes mellitus     Past Surgical History:   Procedure Laterality Date    ANGIOGRAM, CORONARY, WITH LEFT HEART CATHETERIZATION N/A 8/1/2022    Procedure: Angiogram, Coronary, with Left Heart Cath;  Surgeon: Lev Manuel MD;  Location: [facility] CATH LAB;  Service: Cardiology;  Laterality: N/A;    kidney stones removal      rotator cuff surgery       Social History     Socioeconomic History    Marital status:     Number of children: 2   Occupational History    Occupation: medical assistant    Occupation: Medical Assistant     Employer: [employer]     Comment: [employer]   Tobacco Use    Smoking status: Never    Smokeless tobacco: Never   Substance and Sexual Activity    Alcohol use: Not Currently    Drug use: Never    Sexual activity: Yes        Family History   Problem Relation Age of Onset    Cancer Mother     Heart disease Father          Current Outpatient Medications:     albuterol (PROVENTIL/VENTOLIN HFA) 90 mcg/actuation inhaler, Inhale 2 puffs into the lungs daily as needed for Shortness of Breath., Disp: 18 g, Rfl: 0    aspirin (ECOTRIN) 81 MG EC tablet, Take 1 tablet (81 mg total) by mouth once daily., Disp: 90 tablet, Rfl: 3    atorvastatin (LIPITOR) 40 MG tablet, Take 1 tablet (40 mg total) by mouth once daily., Disp: 90 tablet, Rfl: 3    ergocalciferol (ERGOCALCIFEROL) 50,000 unit Cap, Take 1 capsule (50,000 Units total) by mouth every 7 days., Disp: 4 capsule, Rfl: 5    metFORMIN (GLUCOPHAGE) 500 MG tablet, Take 1 tablet (500 mg total) by mouth 2 (two) times daily  "with meals., Disp: 60 tablet, Rfl: 3    metoprolol succinate (TOPROL-XL) 25 MG 24 hr tablet, Take 1 tablet (25 mg total) by mouth once daily., Disp: 90 tablet, Rfl: 3    sacubitriL-valsartan (ENTRESTO) 24-26 mg per tablet, Take 1 tablet by mouth 2 (two) times daily., Disp: 180 tablet, Rfl: 3    testosterone cypionate (DEPOTESTOTERONE CYPIONATE) 200 mg/mL injection, Inject 0.5 mLs (100 mg total) into the muscle every 14 (fourteen) days., Disp: 10 mL, Rfl: 0    TRULICITY 0.75 mg/0.5 mL pen injector, Inject 0.75 mg into the skin once a week., Disp: , Rfl:     BD ULTRA-FINE MINI PEN NEEDLE 31 gauge x 3/16" Ndle, SMARTSIG:Pre-Filled Pen Syringe SUB-Q As Directed, Disp: , Rfl:     blood-glucose meter Misc, 1 Device by Misc.(Non-Drug; Combo Route) route 3 (three) times daily before meals., Disp: 1 each, Rfl: 0    CONTOUR NEXT TEST STRIPS Strp, USE AS DIRECTED THREE TIMES DAILY BEFORE MEALS, Disp: 100 strip, Rfl: 0    lancets Misc, 1 lancet by Misc.(Non-Drug; Combo Route) route 3 (three) times daily before meals., Disp: 100 each, Rfl: 11    lancing device Misc, 1 Device by Misc.(Non-Drug; Combo Route) route 3 (three) times daily before meals., Disp: 1 each, Rfl: 0    NURTEC 75 mg odt, Take 75 mg by mouth daily as needed., Disp: , Rfl:     syringe with needle, safety 3 mL 23 gauge x 1" Syrg, 1 each by Misc.(Non-Drug; Combo Route) route as needed (to administer testosterone injections IM)., Disp: 25 each, Rfl: 0    testosterone cypionate (DEPOTESTOTERONE CYPIONATE) 100 mg/mL injection, Inject 1 mL (100 mg total) into the muscle every 14 (fourteen) days. for 10 doses (Patient not taking: Reported on 12/8/2022), Disp: 10 mL, Rfl: 0    Current Facility-Administered Medications:     sodium chloride 0.9% flush 10 mL, 10 mL, Intravenous, PRN, Lev Manuel MD     ROS:                                                                                                                                                                    " "         Review of Systems   Constitutional: Negative.    HENT: Negative.     Eyes: Negative.    Respiratory: Negative.  Negative for shortness of breath.    Cardiovascular: Negative.    Gastrointestinal: Negative.    Genitourinary: Negative.    Musculoskeletal: Negative.    Skin: Negative.    Neurological: Negative.    Endo/Heme/Allergies: Negative.    Psychiatric/Behavioral: Negative.        Blood pressure 121/82, pulse 70, temperature 98.2 °F (36.8 °C), resp. rate 18, height 5' 7.72" (1.72 m), weight 121.4 kg (267 lb 9.6 oz), SpO2 98 %.   PE:  Physical Exam  Constitutional:       Appearance: Normal appearance.   HENT:      Head: Normocephalic.   Eyes:      Pupils: Pupils are equal, round, and reactive to light.   Cardiovascular:      Rate and Rhythm: Normal rate and regular rhythm.      Pulses: Normal pulses.   Pulmonary:      Effort: Pulmonary effort is normal.   Musculoskeletal:         General: Normal range of motion.   Skin:     General: Skin is warm and dry.   Neurological:      General: No focal deficit present.      Mental Status: He is alert and oriented to person, place, and time.   Psychiatric:         Mood and Affect: Mood normal.         Behavior: Behavior normal.              ASSESSMENT/PLAN:  NICMO/HFrEF  - LVEF- 25% per ECHO June 2022  - s/p LHC - moderate CAD (60% 1st Mrg lesion ) 8.1.22  - Denies SOB, RAYO   - Euvolemic upon exam, warm and dry  - Continue GDMT: Entresto 24/26 mg BID, Metoprolol succinate 25 mg qd.  Unable to further optimize at this time.  Home BP log reviewed with noted systolic blood pressure in the 90s  - Repeat echocardiogram to reassess LV function. Will notify with results  - if LV function remains severely depressed, can consider AICD for primary prevention of SCD   - counseled patient on low-salt diet    CAD  - s/p LHC - moderate CAD (60% 1st Mrg lesion ) 8.1.22  - Denies CP, SOB  - continue ASA, Atorvastatin,  BB  - instructed patient to continue with lifestyle " modifications, including heart healthy, low-cholesterol diet and    HLD  - LDL - at goal-51  - Continue Atorvastatin 40 mg  - Counseled on heart healthy, low-cholesterol diet activity as tolerated  - Patient reports, has repeat labs with PCP    DM  - A1C- 6.0  - Continue management per PCP     Echocardiogram   Follow-up in Cardiology Clinic in 4 months or sooner if needed  Follow with PCP directed

## 2022-12-08 ENCOUNTER — OFFICE VISIT (OUTPATIENT)
Dept: CARDIOLOGY | Facility: CLINIC | Age: 57
End: 2022-12-08
Payer: COMMERCIAL

## 2022-12-08 ENCOUNTER — LAB VISIT (OUTPATIENT)
Dept: LAB | Facility: HOSPITAL | Age: 57
End: 2022-12-08
Attending: FAMILY MEDICINE
Payer: COMMERCIAL

## 2022-12-08 VITALS
SYSTOLIC BLOOD PRESSURE: 121 MMHG | HEIGHT: 68 IN | DIASTOLIC BLOOD PRESSURE: 82 MMHG | TEMPERATURE: 98 F | OXYGEN SATURATION: 98 % | HEART RATE: 70 BPM | BODY MASS INDEX: 40.56 KG/M2 | WEIGHT: 267.63 LBS | RESPIRATION RATE: 18 BRPM

## 2022-12-08 DIAGNOSIS — R79.89 LOW TESTOSTERONE: Chronic | ICD-10-CM

## 2022-12-08 DIAGNOSIS — I10 PRIMARY HYPERTENSION: ICD-10-CM

## 2022-12-08 DIAGNOSIS — I42.8 NON-ISCHEMIC CARDIOMYOPATHY: Primary | ICD-10-CM

## 2022-12-08 DIAGNOSIS — E78.2 MIXED HYPERLIPIDEMIA: ICD-10-CM

## 2022-12-08 LAB — TESTOST SERPL-MCNC: 457.79 NG/DL (ref 220.91–715.81)

## 2022-12-08 PROCEDURE — 3066F NEPHROPATHY DOC TX: CPT | Mod: CPTII,,, | Performed by: NURSE PRACTITIONER

## 2022-12-08 PROCEDURE — 99215 OFFICE O/P EST HI 40 MIN: CPT | Mod: PBBFAC | Performed by: NURSE PRACTITIONER

## 2022-12-08 PROCEDURE — 99214 PR OFFICE/OUTPT VISIT, EST, LEVL IV, 30-39 MIN: ICD-10-PCS | Mod: S$PBB,,, | Performed by: NURSE PRACTITIONER

## 2022-12-08 PROCEDURE — 3079F PR MOST RECENT DIASTOLIC BLOOD PRESSURE 80-89 MM HG: ICD-10-PCS | Mod: CPTII,,, | Performed by: NURSE PRACTITIONER

## 2022-12-08 PROCEDURE — 3061F PR NEG MICROALBUMINURIA RESULT DOCUMENTED/REVIEW: ICD-10-PCS | Mod: CPTII,,, | Performed by: NURSE PRACTITIONER

## 2022-12-08 PROCEDURE — 3074F SYST BP LT 130 MM HG: CPT | Mod: CPTII,,, | Performed by: NURSE PRACTITIONER

## 2022-12-08 PROCEDURE — 3061F NEG MICROALBUMINURIA REV: CPT | Mod: CPTII,,, | Performed by: NURSE PRACTITIONER

## 2022-12-08 PROCEDURE — 3008F PR BODY MASS INDEX (BMI) DOCUMENTED: ICD-10-PCS | Mod: CPTII,,, | Performed by: NURSE PRACTITIONER

## 2022-12-08 PROCEDURE — 1159F PR MEDICATION LIST DOCUMENTED IN MEDICAL RECORD: ICD-10-PCS | Mod: CPTII,,, | Performed by: NURSE PRACTITIONER

## 2022-12-08 PROCEDURE — 99214 OFFICE O/P EST MOD 30 MIN: CPT | Mod: S$PBB,,, | Performed by: NURSE PRACTITIONER

## 2022-12-08 PROCEDURE — 3066F PR DOCUMENTATION OF TREATMENT FOR NEPHROPATHY: ICD-10-PCS | Mod: CPTII,,, | Performed by: NURSE PRACTITIONER

## 2022-12-08 PROCEDURE — 1160F PR REVIEW ALL MEDS BY PRESCRIBER/CLIN PHARMACIST DOCUMENTED: ICD-10-PCS | Mod: CPTII,,, | Performed by: NURSE PRACTITIONER

## 2022-12-08 PROCEDURE — 3079F DIAST BP 80-89 MM HG: CPT | Mod: CPTII,,, | Performed by: NURSE PRACTITIONER

## 2022-12-08 PROCEDURE — 4010F PR ACE/ARB THEARPY RXD/TAKEN: ICD-10-PCS | Mod: CPTII,,, | Performed by: NURSE PRACTITIONER

## 2022-12-08 PROCEDURE — 1159F MED LIST DOCD IN RCRD: CPT | Mod: CPTII,,, | Performed by: NURSE PRACTITIONER

## 2022-12-08 PROCEDURE — 3008F BODY MASS INDEX DOCD: CPT | Mod: CPTII,,, | Performed by: NURSE PRACTITIONER

## 2022-12-08 PROCEDURE — 4010F ACE/ARB THERAPY RXD/TAKEN: CPT | Mod: CPTII,,, | Performed by: NURSE PRACTITIONER

## 2022-12-08 PROCEDURE — 3074F PR MOST RECENT SYSTOLIC BLOOD PRESSURE < 130 MM HG: ICD-10-PCS | Mod: CPTII,,, | Performed by: NURSE PRACTITIONER

## 2022-12-08 PROCEDURE — 1160F RVW MEDS BY RX/DR IN RCRD: CPT | Mod: CPTII,,, | Performed by: NURSE PRACTITIONER

## 2022-12-08 PROCEDURE — 84403 ASSAY OF TOTAL TESTOSTERONE: CPT

## 2022-12-08 PROCEDURE — 36415 COLL VENOUS BLD VENIPUNCTURE: CPT

## 2022-12-08 RX ORDER — DULAGLUTIDE 0.75 MG/.5ML
0.75 INJECTION, SOLUTION SUBCUTANEOUS WEEKLY
COMMUNITY
Start: 2022-11-28 | End: 2023-04-06 | Stop reason: SDUPTHER

## 2022-12-08 NOTE — PATIENT INSTRUCTIONS
Echocardiogram   Follow-up in Cardiology Clinic in 4 months or sooner if needed  Follow with PCP directed

## 2022-12-08 NOTE — PROGRESS NOTES
Labs within acceptable ranges. No change in treatment required. Keep next appointment as scheduled.     Normal Testosterone - JA

## 2022-12-16 ENCOUNTER — TELEPHONE (OUTPATIENT)
Dept: CARDIOLOGY | Facility: HOSPITAL | Age: 57
End: 2022-12-16
Payer: COMMERCIAL

## 2022-12-16 NOTE — TELEPHONE ENCOUNTER
----- Message from BHARTI Heart sent at 12/15/2022  8:50 AM CST -----  Regarding: Cardiac risk stratification  Cardiac risk stratification for colonoscopy added to last clinic note.  Paper work placed in Nurse task box     Thank you    Paulina HAY-[facility]

## 2022-12-20 LAB
CREAT UR-MCNC: 54.1 MG/DL (ref 63–166)
PROT UR STRIP-MCNC: <6.8 MG/DL

## 2023-01-08 ENCOUNTER — OFFICE VISIT (OUTPATIENT)
Dept: URGENT CARE | Facility: CLINIC | Age: 58
End: 2023-01-08
Payer: COMMERCIAL

## 2023-01-08 ENCOUNTER — HOSPITAL ENCOUNTER (EMERGENCY)
Facility: HOSPITAL | Age: 58
Discharge: HOME | End: 2023-01-08
Attending: EMERGENCY MEDICINE
Payer: COMMERCIAL

## 2023-01-08 VITALS
HEIGHT: 68 IN | TEMPERATURE: 98 F | RESPIRATION RATE: 20 BRPM | BODY MASS INDEX: 39.73 KG/M2 | WEIGHT: 262.13 LBS | DIASTOLIC BLOOD PRESSURE: 83 MMHG | OXYGEN SATURATION: 98 % | SYSTOLIC BLOOD PRESSURE: 141 MMHG | HEART RATE: 59 BPM

## 2023-01-08 VITALS
HEIGHT: 68 IN | TEMPERATURE: 97 F | HEART RATE: 57 BPM | SYSTOLIC BLOOD PRESSURE: 144 MMHG | OXYGEN SATURATION: 100 % | DIASTOLIC BLOOD PRESSURE: 84 MMHG | BODY MASS INDEX: 40.41 KG/M2 | RESPIRATION RATE: 18 BRPM | WEIGHT: 266.63 LBS

## 2023-01-08 DIAGNOSIS — R10.9 ABDOMINAL PAIN, UNSPECIFIED ABDOMINAL LOCATION: Primary | ICD-10-CM

## 2023-01-08 DIAGNOSIS — R11.2 NAUSEA AND VOMITING: ICD-10-CM

## 2023-01-08 DIAGNOSIS — R10.13 EPIGASTRIC PAIN: Primary | ICD-10-CM

## 2023-01-08 LAB
ALBUMIN SERPL-MCNC: 4.5 G/DL (ref 3.5–5)
ALBUMIN/GLOB SERPL: 1.3 RATIO (ref 1.1–2)
ALP SERPL-CCNC: 88 UNIT/L (ref 40–150)
ALT SERPL-CCNC: 20 UNIT/L (ref 0–55)
APPEARANCE UR: CLEAR
AST SERPL-CCNC: 20 UNIT/L (ref 5–34)
BACTERIA #/AREA URNS AUTO: ABNORMAL /HPF
BASOPHILS # BLD AUTO: 0.03 X10(3)/MCL (ref 0–0.2)
BASOPHILS NFR BLD AUTO: 0.3 %
BILIRUB UR QL STRIP.AUTO: NEGATIVE MG/DL
BILIRUBIN DIRECT+TOT PNL SERPL-MCNC: 0.7 MG/DL
BUN SERPL-MCNC: 7.3 MG/DL (ref 8.4–25.7)
CALCIUM SERPL-MCNC: 10.7 MG/DL (ref 8.4–10.2)
CHLORIDE SERPL-SCNC: 106 MMOL/L (ref 98–107)
CO2 SERPL-SCNC: 22 MMOL/L (ref 22–29)
COLOR UR AUTO: YELLOW
CREAT SERPL-MCNC: 0.82 MG/DL (ref 0.73–1.18)
EOSINOPHIL # BLD AUTO: 0 X10(3)/MCL (ref 0–0.9)
EOSINOPHIL NFR BLD AUTO: 0 %
ERYTHROCYTE [DISTWIDTH] IN BLOOD BY AUTOMATED COUNT: 13.8 % (ref 11.6–14.4)
GFR SERPLBLD CREATININE-BSD FMLA CKD-EPI: >90 MLS/MIN/1.73/M2
GLOBULIN SER-MCNC: 3.4 GM/DL (ref 2.4–3.5)
GLUCOSE SERPL-MCNC: 101 MG/DL (ref 74–100)
GLUCOSE UR QL STRIP.AUTO: NORMAL MG/DL
HCT VFR BLD AUTO: 43.9 % (ref 42–52)
HGB BLD-MCNC: 14.4 GM/DL (ref 14–18)
HYALINE CASTS #/AREA URNS LPF: ABNORMAL /LPF
IMM GRANULOCYTES # BLD AUTO: 0.02 X10(3)/MCL (ref 0–0.04)
IMM GRANULOCYTES NFR BLD AUTO: 0.2 %
KETONES UR QL STRIP.AUTO: ABNORMAL MG/DL
LACTATE SERPL-SCNC: 1.8 MMOL/L (ref 0.5–2.2)
LACTATE SERPL-SCNC: 2.1 MMOL/L (ref 0.5–2.2)
LEUKOCYTE ESTERASE UR QL STRIP.AUTO: NEGATIVE UNIT/L
LIPASE SERPL-CCNC: 9 U/L
LYMPHOCYTES # BLD AUTO: 2.2 X10(3)/MCL (ref 0.6–4.6)
LYMPHOCYTES NFR BLD AUTO: 23 %
MCH RBC QN AUTO: 27.6 PG
MCHC RBC AUTO-ENTMCNC: 32.8 MG/DL (ref 33–36)
MCV RBC AUTO: 84.3 FL (ref 80–94)
MONOCYTES # BLD AUTO: 0.52 X10(3)/MCL (ref 0.1–1.3)
MONOCYTES NFR BLD AUTO: 5.4 %
MUCOUS THREADS URNS QL MICRO: ABNORMAL /LPF
NEUTROPHILS # BLD AUTO: 6.79 X10(3)/MCL (ref 2.1–9.2)
NEUTROPHILS NFR BLD AUTO: 71.1 %
NITRITE UR QL STRIP.AUTO: NEGATIVE
NRBC BLD AUTO-RTO: 0 % (ref 0–1)
PH UR STRIP.AUTO: 8.5 [PH]
PLATELET # BLD AUTO: 404 X10(3)/MCL (ref 140–371)
PMV BLD AUTO: 9.9 FL (ref 9.4–12.4)
POTASSIUM SERPL-SCNC: 4 MMOL/L (ref 3.5–5.1)
PROT SERPL-MCNC: 7.9 GM/DL (ref 6.4–8.3)
PROT UR QL STRIP.AUTO: ABNORMAL MG/DL
RBC # BLD AUTO: 5.21 X10(6)/MCL (ref 4.7–6.1)
RBC #/AREA URNS AUTO: ABNORMAL /HPF
RBC UR QL AUTO: NEGATIVE UNIT/L
SODIUM SERPL-SCNC: 139 MMOL/L (ref 136–145)
SP GR UR STRIP.AUTO: 1.03
SQUAMOUS #/AREA URNS LPF: ABNORMAL /HPF
TROPONIN I SERPL-MCNC: <0.01 NG/ML (ref 0–0.04)
UROBILINOGEN UR STRIP-ACNC: ABNORMAL MG/DL
WBC # SPEC AUTO: 9.6 X10(3)/MCL (ref 4.5–11.5)
WBC #/AREA URNS AUTO: ABNORMAL /HPF

## 2023-01-08 PROCEDURE — 99213 OFFICE O/P EST LOW 20 MIN: CPT | Mod: S$PBB,,, | Performed by: NURSE PRACTITIONER

## 2023-01-08 PROCEDURE — 96375 TX/PRO/DX INJ NEW DRUG ADDON: CPT

## 2023-01-08 PROCEDURE — 83690 ASSAY OF LIPASE: CPT | Performed by: PHYSICIAN ASSISTANT

## 2023-01-08 PROCEDURE — 81001 URINALYSIS AUTO W/SCOPE: CPT | Performed by: PHYSICIAN ASSISTANT

## 2023-01-08 PROCEDURE — 83605 ASSAY OF LACTIC ACID: CPT | Performed by: PHYSICIAN ASSISTANT

## 2023-01-08 PROCEDURE — 80053 COMPREHEN METABOLIC PANEL: CPT | Performed by: PHYSICIAN ASSISTANT

## 2023-01-08 PROCEDURE — 93005 ELECTROCARDIOGRAM TRACING: CPT

## 2023-01-08 PROCEDURE — 84484 ASSAY OF TROPONIN QUANT: CPT | Performed by: PHYSICIAN ASSISTANT

## 2023-01-08 PROCEDURE — 25500020 PHARM REV CODE 255: Performed by: PHYSICIAN ASSISTANT

## 2023-01-08 PROCEDURE — 99213 PR OFFICE/OUTPT VISIT, EST, LEVL III, 20-29 MIN: ICD-10-PCS | Mod: S$PBB,,, | Performed by: NURSE PRACTITIONER

## 2023-01-08 PROCEDURE — 63600175 PHARM REV CODE 636 W HCPCS: Performed by: PHYSICIAN ASSISTANT

## 2023-01-08 PROCEDURE — 99215 OFFICE O/P EST HI 40 MIN: CPT | Mod: PBBFAC,25 | Performed by: NURSE PRACTITIONER

## 2023-01-08 PROCEDURE — 99285 EMERGENCY DEPT VISIT HI MDM: CPT | Mod: 25,27

## 2023-01-08 PROCEDURE — 85025 COMPLETE CBC W/AUTO DIFF WBC: CPT | Performed by: PHYSICIAN ASSISTANT

## 2023-01-08 PROCEDURE — 96374 THER/PROPH/DIAG INJ IV PUSH: CPT | Mod: 59

## 2023-01-08 RX ORDER — SUCRALFATE 1 G/1
1 TABLET ORAL 2 TIMES DAILY
Qty: 20 TABLET | Refills: 0 | Status: SHIPPED | OUTPATIENT
Start: 2023-01-08 | End: 2023-01-18

## 2023-01-08 RX ORDER — MORPHINE SULFATE 2 MG/ML
2 INJECTION, SOLUTION INTRAMUSCULAR; INTRAVENOUS
Status: COMPLETED | OUTPATIENT
Start: 2023-01-08 | End: 2023-01-08

## 2023-01-08 RX ORDER — ONDANSETRON 2 MG/ML
4 INJECTION INTRAMUSCULAR; INTRAVENOUS
Status: COMPLETED | OUTPATIENT
Start: 2023-01-08 | End: 2023-01-08

## 2023-01-08 RX ORDER — ONDANSETRON 4 MG/1
4 TABLET, ORALLY DISINTEGRATING ORAL EVERY 6 HOURS PRN
Qty: 20 TABLET | Refills: 0 | Status: SHIPPED | OUTPATIENT
Start: 2023-01-08 | End: 2023-09-05 | Stop reason: SDUPTHER

## 2023-01-08 RX ORDER — MORPHINE SULFATE 2 MG/ML
2 INJECTION, SOLUTION INTRAMUSCULAR; INTRAVENOUS
Status: DISCONTINUED | OUTPATIENT
Start: 2023-01-08 | End: 2023-01-08

## 2023-01-08 RX ADMIN — IOPAMIDOL 100 ML: 755 INJECTION, SOLUTION INTRAVENOUS at 03:01

## 2023-01-08 RX ADMIN — ONDANSETRON 4 MG: 2 INJECTION INTRAMUSCULAR; INTRAVENOUS at 02:01

## 2023-01-08 RX ADMIN — MORPHINE SULFATE 2 MG: 2 INJECTION, SOLUTION INTRAMUSCULAR; INTRAVENOUS at 02:01

## 2023-01-08 NOTE — Clinical Note
"Chadwick Lorenzo (Richard) was seen and treated in our emergency department on 1/8/2023.  He may return to work on 01/10/2023.       If you have any questions or concerns, please don't hesitate to call.       RN    "

## 2023-01-08 NOTE — ED PROVIDER NOTES
Encounter Date: 1/8/2023       History     Chief Complaint   Patient presents with    Abdominal Pain     Midline upper abd pain since 3am today.  Vomitedx4.  Ekg done in triage     Chadwick Lorenzo is a 57 y.o. male with a history of CHF, T2DM, HLD, nephrolithiasis who presents to the ED complanining of abdominal pain, nausea, and vomiting. Patient reports 10/10 sharp stabbing epigastric abdominal pain that started all of a sudden at 3 am today. He has also been nauseas and vomiting since symptom onset. He at a large meal from chinese restaurant for dinner. He has never had pain like this before. He denies fevers, chills, chest pain, SOB, constipation, diarrhea, dysuria.     The history is provided by the patient. No  was used.   Review of patient's allergies indicates:  No Known Allergies  Past Medical History:   Diagnosis Date    CHF (congestive heart failure)     Diabetes mellitus, type 2     Hyperlipidemia     IGT (impaired glucose tolerance)     Kidney stones     Low testosterone in male     Migraine     Morbid obesity     Vitamin D deficiency      Past Surgical History:   Procedure Laterality Date    ANGIOGRAM, CORONARY, WITH LEFT HEART CATHETERIZATION N/A 8/1/2022    Procedure: Angiogram, Coronary, with Left Heart Cath;  Surgeon: Lev Manuel MD;  Location: [facility] CATH LAB;  Service: Cardiology;  Laterality: N/A;    kidney stones removal      rotator cuff surgery       Family History   Problem Relation Age of Onset    Cancer Mother     Heart disease Father      Social History     Tobacco Use    Smoking status: Never    Smokeless tobacco: Never   Substance Use Topics    Alcohol use: Not Currently    Drug use: Never     Review of Systems   Constitutional:  Negative for activity change, chills and fever.   HENT:  Negative for congestion and trouble swallowing.    Eyes:  Negative for photophobia and visual disturbance.   Respiratory:  Negative for chest tightness, shortness of breath and  wheezing.    Cardiovascular:  Negative for chest pain, palpitations and leg swelling.   Gastrointestinal:  Positive for abdominal pain, nausea and vomiting. Negative for constipation and diarrhea.   Genitourinary:  Negative for dysuria, frequency, hematuria and urgency.   Musculoskeletal:  Negative for arthralgias, back pain and gait problem.   Skin:  Negative for color change and rash.   Neurological:  Negative for dizziness, syncope, weakness, light-headedness, numbness and headaches.   Psychiatric/Behavioral:  Negative for agitation and confusion. The patient is not nervous/anxious.      Physical Exam     Initial Vitals [01/08/23 1206]   BP Pulse Resp Temp SpO2   (!) 141/83 (!) 59 18 98.1 °F (36.7 °C) 98 %      MAP       --         Physical Exam    Nursing note and vitals reviewed.  Constitutional: He appears well-developed and well-nourished. He is Obese . No distress.   Uncomfortable appearing   HENT:   Head: Normocephalic and atraumatic.   Mouth/Throat: No oropharyngeal exudate.   Eyes: EOM are normal. No scleral icterus.   Neck: Neck supple.   Normal range of motion.  Cardiovascular:  Normal rate and regular rhythm.           No murmur heard.  Pulmonary/Chest: No respiratory distress. He has no wheezes.   Abdominal: Abdomen is soft. He exhibits no distension. There is abdominal tenderness in the epigastric area.   No right CVA tenderness.  No left CVA tenderness. There is no rebound, no guarding and negative Gonzalez's sign.   Musculoskeletal:         General: No edema. Normal range of motion.      Cervical back: Normal range of motion and neck supple.     Neurological: He is alert and oriented to person, place, and time. No cranial nerve deficit.   Skin: Skin is warm and dry. Capillary refill takes less than 2 seconds. No erythema.   Psychiatric: He has a normal mood and affect. Thought content normal.       ED Course   Procedures  Labs Reviewed   COMPREHENSIVE METABOLIC PANEL - Abnormal; Notable for the  following components:       Result Value    Glucose Level 101 (*)     Blood Urea Nitrogen 7.3 (*)     Calcium Level Total 10.7 (*)     All other components within normal limits   URINALYSIS, REFLEX TO URINE CULTURE - Abnormal; Notable for the following components:    Protein, UA Trace (*)     Ketones, UA 1+ (*)     Urobilinogen, UA 1+ (*)     Mucous, UA Many (*)     All other components within normal limits   CBC WITH DIFFERENTIAL - Abnormal; Notable for the following components:    MCHC 32.8 (*)     Platelet 404 (*)     All other components within normal limits   LIPASE - Normal   LACTIC ACID, PLASMA - Normal   TROPONIN I - Normal   CBC W/ AUTO DIFFERENTIAL    Narrative:     The following orders were created for panel order CBC auto differential.  Procedure                               Abnormality         Status                     ---------                               -----------         ------                     CBC with Differential[[id number]]        Abnormal            Final result                 Please view results for these tests on the individual orders.   LACTIC ACID, PLASMA        ECG Results              EKG 12-lead ([place] and Vomiting for diabetes) Age > 20 (In process)  Result time 01/08/23 12:08:36      In process by Interface, Lab In [facility] (01/08/23 12:08:36)                   Narrative:    Test Reason : R11.2,    Vent. Rate : 057 BPM     Atrial Rate : 057 BPM     P-R Int : 194 ms          QRS Dur : 096 ms      QT Int : 408 ms       P-R-T Axes : 037 043 047 degrees     QTc Int : 397 ms    Sinus bradycardia  Otherwise normal ECG  When compared with ECG of 28-JUL-2022 13:30,  No significant change was found    Referred By:             Confirmed By:                                   Imaging Results              CT Abdomen Pelvis With Contrast (Final result)  Result time 01/08/23 15:54:08      Final result by Lev Adair MD (01/08/23 15:54:08)                   Impression:      1. Bibasilar  atelectatic changes lungs.  No acute process of the abdomen or pelvis.  2. Other secondary findings as noted.      Electronically signed by: Lev Adair MD  Date:    01/08/2023  Time:    15:54               Narrative:    EXAMINATION:  CT ABDOMEN PELVIS WITH CONTRAST    CLINICAL HISTORY:  Epigastric pain;    TECHNIQUE:  Multiple cross-sectional images of the abdomen pelvis were obtained after the intravenous administration of contrast.  Coronal and sagittal reformatted images were obtained.  An automated dose exposure technique was utilized.  This limits radiation does the patient.    COMPARISON:  07/14/2020    FINDINGS:  Dependent atelectatic changes lungs with trace effusions.  Heart size within normal limits with coronary artery calcifications.    The liver is hypodense suggestive hepatic steatosis.  Calcified gallstone is identified without gallbladder wall thickening or tensile wall gallbladder sign.  Spleen, adrenals, kidneys with Bosniak type 1 cyst on the right, and pancreas are normal.    Small bowel is of normal caliber.  Colon is of normal caliber with scattered colonic diverticula.  No adjacent inflammatory changes.  Normal appendix.    Bladder is under distended with wall thickening.  Prostate is enlarged.  Course and caliber of the abdominal aorta is normal.  No free fluid in the abdomen pelvis.  No evidence for adenopathy.    No suspicious calcifications or masses.  Soft tissues are normal.                                       Medications   morphine injection 2 mg (2 mg Intravenous Given 1/8/23 1425)   ondansetron injection 4 mg (4 mg Intravenous Given 1/8/23 1425)   iopamidoL (ISOVUE-370) injection 100 mL (100 mLs Intravenous Given 1/8/23 1540)     Medical Decision Making:   Differential Diagnosis:   Gastroenteritis, cholecystitis, PUD  Clinical Tests:   Lab Tests: Ordered and Reviewed  The following lab test(s) were unremarkable: CBC, CMP, Lipase and Urinalysis  Radiological Study: Ordered and  Reviewed           ED Course as of 01/08/23 1631   Sun Jan 08, 2023   1543 Pt re-evaluated. Reports improvement in pain and nausea [KD]   1625 CT abdomen/pelvis without acute process, gallstone without evience of cholecystitis. Suspect pain is 2/2 gastroenteritis vs biliary colic as patient did eat a large meal of chinese food prior to symptom onset. He is still resting comfortably. Stable for discharge with PCP follow up in 1 week. Return precautions given. Patient verbalized understanding. All questions answered.  [KD]      ED Course User Index  [KD] Bernice Ferguson PA-C                 Clinical Impression:   Final diagnoses:  [R11.2] Nausea and vomiting  [R10.13] Epigastric pain (Primary)        ED Disposition Condition    Discharge Stable          ED Prescriptions       Medication Sig Dispense Start Date End Date Auth. Provider    ondansetron (ZOFRAN-ODT) 4 MG TbDL Take 1 tablet (4 mg total) by mouth every 6 (six) hours as needed (nausea/vomiting). 20 tablet 1/8/2023 -- Bernice Ferguson PA-C    sucralfate (CARAFATE) 1 gram tablet Take 1 tablet (1 g total) by mouth 2 (two) times daily. for 10 days 20 tablet 1/8/2023 1/18/2023 Bernice Ferguson PA-C          Follow-up Information       Follow up With Specialties Details Why Contact Info    Guilherme Geronimo MD Family Medicine In 3 days Hospital follow up [address] [address]  [phone]      [facility] Emergency Medicine  If symptoms worsen [address] [facility] [address] [phone]             Bernice Ferguson PA-C  01/08/23 1633

## 2023-01-08 NOTE — PROGRESS NOTES
"Subjective:       Patient ID: Chadwick Lorenzo is a 57 y.o. male.    Vitals:  height is 5' 7.72" (1.72 m) and weight is 120.9 kg (266 lb 9.6 oz). His oral temperature is 97.4 °F (36.3 °C). His blood pressure is 144/84 (abnormal) and his pulse is 57 (abnormal). His respiration is 18 and oxygen saturation is 100%.     Chief Complaint: Abdominal Pain (Upper abd pain) and Emesis (X 4 today)    Pt is a 57-year-old male, here today for severe abdominal pain, vomiting that started this morning.  Rates pain 10/10.  Denies constipation, diarrhea, radiating pain.  States he has had kidney stones in the past, denies any other abdominal surgeries.      Cardiovascular: Negative.    Respiratory: Negative.     Gastrointestinal:  Positive for abdominal pain, nausea and vomiting.   Genitourinary: Negative.      Objective:      Physical Exam   Constitutional: He is oriented to person, place, and time. He appears well-developed. He appears distressed.   HENT:   Head: Normocephalic.   Eyes: Conjunctivae and EOM are normal. Pupils are equal, round, and reactive to light.   Neck: Neck supple.   Cardiovascular: Normal rate, regular rhythm and normal heart sounds.   Pulmonary/Chest: Effort normal and breath sounds normal.   Abdominal: Soft. There is abdominal tenderness in the right upper quadrant and epigastric area.   Musculoskeletal: Normal range of motion.         General: Normal range of motion.   Neurological: He is alert and oriented to person, place, and time.   Skin: Skin is warm and dry. Capillary refill takes less than 2 seconds.   Psychiatric: His behavior is normal.   Vitals reviewed.      Assessment:       1. Abdominal pain, unspecified abdominal location                No results found.   Plan:           Discussed with patient, due to severe abdominal pain will transfer to ED for further eval and treatment.  Patient transferred via wheelchair by [facility] staff.  Abdominal pain, unspecified abdominal location                     "

## 2023-01-13 ENCOUNTER — HOSPITAL ENCOUNTER (OUTPATIENT)
Dept: CARDIOLOGY | Facility: HOSPITAL | Age: 58
Discharge: HOME | End: 2023-01-13
Attending: NURSE PRACTITIONER
Payer: COMMERCIAL

## 2023-01-13 ENCOUNTER — OFFICE VISIT (OUTPATIENT)
Dept: FAMILY MEDICINE | Facility: CLINIC | Age: 58
End: 2023-01-13
Payer: COMMERCIAL

## 2023-01-13 VITALS
DIASTOLIC BLOOD PRESSURE: 81 MMHG | HEIGHT: 68 IN | WEIGHT: 260 LBS | BODY MASS INDEX: 39.4 KG/M2 | SYSTOLIC BLOOD PRESSURE: 120 MMHG

## 2023-01-13 VITALS
DIASTOLIC BLOOD PRESSURE: 84 MMHG | SYSTOLIC BLOOD PRESSURE: 121 MMHG | OXYGEN SATURATION: 97 % | TEMPERATURE: 99 F | WEIGHT: 260 LBS | RESPIRATION RATE: 18 BRPM | BODY MASS INDEX: 39.4 KG/M2 | HEART RATE: 79 BPM | HEIGHT: 68 IN

## 2023-01-13 DIAGNOSIS — R10.13 EPIGASTRIC ABDOMINAL PAIN: Primary | ICD-10-CM

## 2023-01-13 DIAGNOSIS — I42.8 NON-ISCHEMIC CARDIOMYOPATHY: ICD-10-CM

## 2023-01-13 LAB
AV INDEX (PROSTH): 0.81
AV MEAN GRADIENT: 6 MMHG
AV PEAK GRADIENT: 10 MMHG
AV REGURGITATION PRESSURE HALF TIME: 360.18 MS
AV VALVE AREA: 2.63 CM2
AV VELOCITY RATIO: 0.85
BSA FOR ECHO PROCEDURE: 2.38 M2
CV ECHO LV RWT: 0.57 CM
DOP CALC AO PEAK VEL: 1.62 M/S
DOP CALC AO VTI: 30.8 CM
DOP CALC LVOT AREA: 3.2 CM2
DOP CALC LVOT DIAMETER: 2.03 CM
DOP CALC LVOT PEAK VEL: 1.37 M/S
DOP CALC LVOT STROKE VOLUME: 80.87 CM3
DOP CALC MV VTI: 16.4 CM
DOP CALCLVOT PEAK VEL VTI: 25 CM
E WAVE DECELERATION TIME: 281.57 MSEC
E/A RATIO: 1.16
E/E' RATIO: 4.43 M/S
ECHO LV POSTERIOR WALL: 0.92 CM (ref 0.6–1.1)
EJECTION FRACTION: 60 %
FRACTIONAL SHORTENING: 35 % (ref 28–44)
HR MV ECHO: 79 BPM
INTERVENTRICULAR SEPTUM: 1.25 CM (ref 0.6–1.1)
LEFT ATRIUM SIZE: 3.38 CM
LEFT ATRIUM VOLUME INDEX MOD: 24 ML/M2
LEFT ATRIUM VOLUME MOD: 55 CM3
LEFT INTERNAL DIMENSION IN SYSTOLE: 2.08 CM (ref 2.1–4)
LEFT VENTRICLE DIASTOLIC VOLUME INDEX: 40.61 ML/M2
LEFT VENTRICLE DIASTOLIC VOLUME: 93 ML
LEFT VENTRICLE MASS INDEX: 45 G/M2
LEFT VENTRICLE SYSTOLIC VOLUME INDEX: 15.7 ML/M2
LEFT VENTRICLE SYSTOLIC VOLUME: 36 ML
LEFT VENTRICULAR INTERNAL DIMENSION IN DIASTOLE: 3.21 CM (ref 3.5–6)
LEFT VENTRICULAR MASS: 102.64 G
LV LATERAL E/E' RATIO: 3.4 M/S
LV SEPTAL E/E' RATIO: 6.38 M/S
LVOT MG: 4.16 MMHG
LVOT MV: 0.97 CM/S
MV MEAN GRADIENT: 1 MMHG
MV PEAK A VEL: 0.44 M/S
MV PEAK E VEL: 0.51 M/S
MV PEAK GRADIENT: 2 MMHG
MV VALVE AREA BY CONTINUITY EQUATION: 4.93 CM2
PISA AR MAX VEL: 3.52 M/S
PISA MRMAX VEL: 3.52 M/S
RA PRESSURE: 3 MMHG
RA WIDTH: 4.5 CM
RIGHT VENTRICULAR END-DIASTOLIC DIMENSION: 4.49 CM
TDI LATERAL: 0.15 M/S
TDI SEPTAL: 0.08 M/S
TDI: 0.12 M/S
TRICUSPID ANNULAR PLANE SYSTOLIC EXCURSION: 2.24 CM

## 2023-01-13 PROCEDURE — 3072F LOW RISK FOR RETINOPATHY: CPT | Mod: CPTII,,, | Performed by: NURSE PRACTITIONER

## 2023-01-13 PROCEDURE — 1159F PR MEDICATION LIST DOCUMENTED IN MEDICAL RECORD: ICD-10-PCS | Mod: CPTII,,, | Performed by: NURSE PRACTITIONER

## 2023-01-13 PROCEDURE — 3008F PR BODY MASS INDEX (BMI) DOCUMENTED: ICD-10-PCS | Mod: CPTII,,, | Performed by: NURSE PRACTITIONER

## 2023-01-13 PROCEDURE — 99213 PR OFFICE/OUTPT VISIT, EST, LEVL III, 20-29 MIN: ICD-10-PCS | Mod: ,,, | Performed by: NURSE PRACTITIONER

## 2023-01-13 PROCEDURE — 1159F MED LIST DOCD IN RCRD: CPT | Mod: CPTII,,, | Performed by: NURSE PRACTITIONER

## 2023-01-13 PROCEDURE — 3074F PR MOST RECENT SYSTOLIC BLOOD PRESSURE < 130 MM HG: ICD-10-PCS | Mod: CPTII,,, | Performed by: NURSE PRACTITIONER

## 2023-01-13 PROCEDURE — 3079F DIAST BP 80-89 MM HG: CPT | Mod: CPTII,,, | Performed by: NURSE PRACTITIONER

## 2023-01-13 PROCEDURE — 93306 TTE W/DOPPLER COMPLETE: CPT

## 2023-01-13 PROCEDURE — 3079F PR MOST RECENT DIASTOLIC BLOOD PRESSURE 80-89 MM HG: ICD-10-PCS | Mod: CPTII,,, | Performed by: NURSE PRACTITIONER

## 2023-01-13 PROCEDURE — 1160F PR REVIEW ALL MEDS BY PRESCRIBER/CLIN PHARMACIST DOCUMENTED: ICD-10-PCS | Mod: CPTII,,, | Performed by: NURSE PRACTITIONER

## 2023-01-13 PROCEDURE — 3072F PR LOW RISK FOR RETINOPATHY: ICD-10-PCS | Mod: CPTII,,, | Performed by: NURSE PRACTITIONER

## 2023-01-13 PROCEDURE — 3074F SYST BP LT 130 MM HG: CPT | Mod: CPTII,,, | Performed by: NURSE PRACTITIONER

## 2023-01-13 PROCEDURE — 3008F BODY MASS INDEX DOCD: CPT | Mod: CPTII,,, | Performed by: NURSE PRACTITIONER

## 2023-01-13 PROCEDURE — 1160F RVW MEDS BY RX/DR IN RCRD: CPT | Mod: CPTII,,, | Performed by: NURSE PRACTITIONER

## 2023-01-13 PROCEDURE — 99213 OFFICE O/P EST LOW 20 MIN: CPT | Mod: ,,, | Performed by: NURSE PRACTITIONER

## 2023-01-13 NOTE — ASSESSMENT & PLAN NOTE
Slightly improved  Of note, patient is scheduled for Colonoscopy with Dr. Cesar on 2/27/2023  Will call Dr. Cesar's office in an attempt to schedule an EGD  Discussed with patient that a surgery referral is needed as well in order to evaluated symptomatic cholelithiasis; he declines at present; states he prefers to see GI for EGD first  Will send refer GI for evaluation  Instructed to continue to monitor symptoms  Report to ED for any fever, chills, nausea, vomiting, worsening ABD pain CP, SOB, and/or worsening symptoms  Pt is agreeable to plan and verbalizes understanding

## 2023-01-13 NOTE — PROGRESS NOTES
Subjective:      Patient ID: Chadwick Lorenzo is a 57 y.o. Black or  male      Chief Complaint: Follow-up (Stomach Pain and no appetite )       Past Medical History:   Diagnosis Date    CHF (congestive heart failure)     Diabetes mellitus, type 2     Hyperlipidemia     IGT (impaired glucose tolerance)     Kidney stones     Low testosterone in male     Migraine     Morbid obesity     Vitamin D deficiency         HPI  Presents to clinic for ED follow up.  Seen in ED with complaints of epigastric pain.  CT revealed cholelithiasis.  States pain improved, but he still has epigastric pain.  Associated with early satiety.  Denies any nausea, vomiting, fever, chills, or malaise.        Review of Systems   Constitutional:  Negative for chills, fatigue, fever and unexpected weight change.   HENT: Negative.     Eyes: Negative.    Respiratory: Negative.  Negative for shortness of breath.    Cardiovascular: Negative.  Negative for chest pain.   Gastrointestinal: Negative.    Endocrine: Negative.    Genitourinary: Negative.    Musculoskeletal: Negative.    Integumentary:  Negative.   Allergic/Immunologic: Negative.    Neurological: Negative.  Negative for weakness.   Hematological: Negative.    Psychiatric/Behavioral: Negative.     All other systems reviewed and are negative.       Objective:      Vitals:    01/13/23 0739   BP: 121/84   Pulse: 79   Resp: 18   Temp: 98.9 °F (37.2 °C)      Body mass index is 39.53 kg/m².     Physical Exam  Vitals reviewed.   Constitutional:       Appearance: He is not toxic-appearing.   HENT:      Head: Normocephalic.      Mouth/Throat:      Mouth: Mucous membranes are moist.   Eyes:      Extraocular Movements: Extraocular movements intact.      Pupils: Pupils are equal, round, and reactive to light.   Cardiovascular:      Rate and Rhythm: Normal rate and regular rhythm.      Pulses: Normal pulses.      Heart sounds: Normal heart sounds.   Pulmonary:      Effort: Pulmonary effort is  "normal. No respiratory distress.      Breath sounds: Normal breath sounds.   Abdominal:      General: Bowel sounds are normal. There is no distension.      Palpations: Abdomen is soft.      Tenderness: There is no abdominal tenderness.   Musculoskeletal:         General: No tenderness. Normal range of motion.      Cervical back: Neck supple.   Skin:     General: Skin is warm and dry.   Neurological:      Mental Status: He is alert and oriented to person, place, and time.   Psychiatric:         Mood and Affect: Mood normal.          Current Outpatient Medications:     albuterol (PROVENTIL/VENTOLIN HFA) 90 mcg/actuation inhaler, Inhale 2 puffs into the lungs daily as needed for Shortness of Breath., Disp: 18 g, Rfl: 0    aspirin (ECOTRIN) 81 MG EC tablet, Take 1 tablet (81 mg total) by mouth once daily., Disp: 90 tablet, Rfl: 3    atorvastatin (LIPITOR) 40 MG tablet, Take 1 tablet (40 mg total) by mouth once daily., Disp: 90 tablet, Rfl: 3    BD ULTRA-FINE MINI PEN NEEDLE 31 gauge x 3/16" Ndle, SMARTSIG:Pre-Filled Pen Syringe SUB-Q As Directed, Disp: , Rfl:     blood-glucose meter Misc, 1 Device by Misc.(Non-Drug; Combo Route) route 3 (three) times daily before meals. (Patient taking differently: 1 Device by Misc.(Non-Drug; Combo Route) route 2 (two) times a day.), Disp: 1 each, Rfl: 0    CONTOUR NEXT TEST STRIPS Strp, USE AS DIRECTED THREE TIMES DAILY BEFORE MEALS (Patient taking differently: 2 (two) times a day.), Disp: 100 strip, Rfl: 0    ergocalciferol, vitamin D2, (VITAMIN D ORAL), Take 2,000 Units by mouth., Disp: , Rfl:     lancets Misc, 1 lancet by Misc.(Non-Drug; Combo Route) route 3 (three) times daily before meals., Disp: 100 each, Rfl: 11    lancing device Misc, 1 Device by Misc.(Non-Drug; Combo Route) route 3 (three) times daily before meals., Disp: 1 each, Rfl: 0    metFORMIN (GLUCOPHAGE) 500 MG tablet, Take 1 tablet (500 mg total) by mouth 2 (two) times daily with meals., Disp: 60 tablet, Rfl: 3    " "NURTEC 75 mg odt, Take 75 mg by mouth daily as needed., Disp: , Rfl:     testosterone cypionate (DEPOTESTOTERONE CYPIONATE) 100 mg/mL injection, Inject 1 mL (100 mg total) into the muscle every 14 (fourteen) days. for 10 doses, Disp: 10 mL, Rfl: 0    TRULICITY 0.75 mg/0.5 mL pen injector, Inject 0.75 mg into the skin once a week., Disp: , Rfl:     ergocalciferol (ERGOCALCIFEROL) 50,000 unit Cap, Take 1 capsule (50,000 Units total) by mouth every 7 days. (Patient not taking: Reported on 1/13/2023), Disp: 4 capsule, Rfl: 5    metoprolol succinate (TOPROL-XL) 25 MG 24 hr tablet, Take 1 tablet (25 mg total) by mouth once daily. (Patient not taking: Reported on 1/13/2023), Disp: 90 tablet, Rfl: 3    ondansetron (ZOFRAN-ODT) 4 MG TbDL, Take 1 tablet (4 mg total) by mouth every 6 (six) hours as needed (nausea/vomiting). (Patient not taking: Reported on 1/13/2023), Disp: 20 tablet, Rfl: 0    sacubitriL-valsartan (ENTRESTO) 24-26 mg per tablet, Take 1 tablet by mouth 2 (two) times daily., Disp: 180 tablet, Rfl: 3    sucralfate (CARAFATE) 1 gram tablet, Take 1 tablet (1 g total) by mouth 2 (two) times daily. for 10 days (Patient not taking: Reported on 1/13/2023), Disp: 20 tablet, Rfl: 0    syringe with needle, safety 3 mL 23 gauge x 1" Syrg, 1 each by Misc.(Non-Drug; Combo Route) route as needed (to administer testosterone injections IM). (Patient not taking: Reported on 1/8/2023), Disp: 25 each, Rfl: 0    Current Facility-Administered Medications:     sodium chloride 0.9% flush 10 mL, 10 mL, Intravenous, PRN, Lev Manuel MD    Assessment & Plan:     Problem List Items Addressed This Visit          GI    Epigastric abdominal pain - Primary     Slightly improved  Of note, patient is scheduled for Colonoscopy with Dr. Cesar on 2/27/2023  Will call Dr. Cesar's office in an attempt to schedule an EGD  Discussed with patient that a surgery referral is needed as well in order to evaluated symptomatic cholelithiasis; he " declines at present; states he prefers to see GI for EGD first  Will send refer GI for evaluation  Instructed to continue to monitor symptoms  Report to ED for any fever, chills, nausea, vomiting, worsening ABD pain CP, SOB, and/or worsening symptoms  Pt is agreeable to plan and verbalizes understanding                     Prior to the patient's arrival on the same day, I spent (5) minutes reviewing chart. Once in the exam room with the patient, I spent (12 ) minutes in the room with the member performing a history and exam as well as reviewing the test results and recommendations with the patient. After leaving the exam room, I spent an additional (3 ) minutes completing the electronic health record. The total time spent that day caring for the member is (20 ) minutes, and this time - including the breakdown - is documented in the medical record.

## 2023-01-30 ENCOUNTER — PATIENT MESSAGE (OUTPATIENT)
Dept: ADMINISTRATIVE | Facility: HOSPITAL | Age: 58
End: 2023-01-30
Payer: COMMERCIAL

## 2023-02-24 DIAGNOSIS — I15.2 HYPERTENSION ASSOCIATED WITH TYPE 2 DIABETES MELLITUS: Chronic | ICD-10-CM

## 2023-02-24 DIAGNOSIS — E11.69 MIXED DIABETIC HYPERLIPIDEMIA ASSOCIATED WITH TYPE 2 DIABETES MELLITUS: ICD-10-CM

## 2023-02-24 DIAGNOSIS — E78.2 MIXED DIABETIC HYPERLIPIDEMIA ASSOCIATED WITH TYPE 2 DIABETES MELLITUS: ICD-10-CM

## 2023-02-24 DIAGNOSIS — E11.65 TYPE 2 DIABETES MELLITUS WITH HYPERGLYCEMIA, WITH LONG-TERM CURRENT USE OF INSULIN: ICD-10-CM

## 2023-02-24 DIAGNOSIS — E11.59 HYPERTENSION ASSOCIATED WITH TYPE 2 DIABETES MELLITUS: Chronic | ICD-10-CM

## 2023-02-24 DIAGNOSIS — Z79.4 TYPE 2 DIABETES MELLITUS WITH HYPERGLYCEMIA, WITH LONG-TERM CURRENT USE OF INSULIN: ICD-10-CM

## 2023-02-24 RX ORDER — METFORMIN HYDROCHLORIDE 500 MG/1
500 TABLET ORAL 2 TIMES DAILY WITH MEALS
Qty: 60 TABLET | Refills: 3 | Status: SHIPPED | OUTPATIENT
Start: 2023-02-24 | End: 2023-05-15 | Stop reason: SDUPTHER

## 2023-02-24 NOTE — TELEPHONE ENCOUNTER
----- Message from Emeterio Sammy sent at 2/24/2023 11:46 AM CST -----  .Type:  RX Refill Request    Who Called: pt  Refill or New Rx:Refill  RX Name and Strength:metFORMIN (GLUCOPHAGE) 500 MG tablet  How is the patient currently taking it? (ex. 1XDay):Route: Take 1 tablet (500 mg total) by mouth 2 (two) times daily with meals. - Oral  Is this a 30 day or 90 day RX: 90  Preferred Pharmacy with phone number:[id number] - [place] - [address]  Local or Mail Order:local  Ordering Provider:  Would the patient rather a call back or a response via [facility]? Call back  Best Call Back Number: [phone]  Additional Information: pt called pharmacy and was told to call the clinic for refill

## 2023-03-17 ENCOUNTER — TELEPHONE (OUTPATIENT)
Dept: FAMILY MEDICINE | Facility: CLINIC | Age: 58
End: 2023-03-17
Payer: COMMERCIAL

## 2023-04-04 ENCOUNTER — LAB VISIT (OUTPATIENT)
Dept: LAB | Facility: HOSPITAL | Age: 58
End: 2023-04-04
Attending: FAMILY MEDICINE
Payer: COMMERCIAL

## 2023-04-04 DIAGNOSIS — I15.2 HYPERTENSION ASSOCIATED WITH TYPE 2 DIABETES MELLITUS: Chronic | ICD-10-CM

## 2023-04-04 DIAGNOSIS — E11.65 TYPE 2 DIABETES MELLITUS WITH HYPERGLYCEMIA, WITH LONG-TERM CURRENT USE OF INSULIN: ICD-10-CM

## 2023-04-04 DIAGNOSIS — Z79.4 TYPE 2 DIABETES MELLITUS WITH HYPERGLYCEMIA, WITH LONG-TERM CURRENT USE OF INSULIN: ICD-10-CM

## 2023-04-04 DIAGNOSIS — E11.59 HYPERTENSION ASSOCIATED WITH TYPE 2 DIABETES MELLITUS: Chronic | ICD-10-CM

## 2023-04-04 LAB
ALBUMIN SERPL-MCNC: 4.1 G/DL (ref 3.5–5)
ALBUMIN/GLOB SERPL: 1.2 RATIO (ref 1.1–2)
ALP SERPL-CCNC: 73 UNIT/L (ref 40–150)
ALT SERPL-CCNC: 20 UNIT/L (ref 0–55)
AST SERPL-CCNC: 18 UNIT/L (ref 5–34)
BILIRUBIN DIRECT+TOT PNL SERPL-MCNC: 0.7 MG/DL
BUN SERPL-MCNC: 10.4 MG/DL (ref 8.4–25.7)
CALCIUM SERPL-MCNC: 10.1 MG/DL (ref 8.4–10.2)
CHLORIDE SERPL-SCNC: 109 MMOL/L (ref 98–107)
CHOLEST SERPL-MCNC: 127 MG/DL
CHOLEST/HDLC SERPL: 4 {RATIO} (ref 0–5)
CO2 SERPL-SCNC: 25 MMOL/L (ref 22–29)
CREAT SERPL-MCNC: 0.9 MG/DL (ref 0.73–1.18)
EST. AVERAGE GLUCOSE BLD GHB EST-MCNC: 122.6 MG/DL
GFR SERPLBLD CREATININE-BSD FMLA CKD-EPI: >60 MLS/MIN/1.73/M2
GLOBULIN SER-MCNC: 3.5 GM/DL (ref 2.4–3.5)
GLUCOSE SERPL-MCNC: 104 MG/DL (ref 74–100)
HBA1C MFR BLD: 5.9 %
HDLC SERPL-MCNC: 35 MG/DL (ref 35–60)
LDLC SERPL CALC-MCNC: 77 MG/DL (ref 50–140)
POTASSIUM SERPL-SCNC: 4.2 MMOL/L (ref 3.5–5.1)
PROT SERPL-MCNC: 7.6 GM/DL (ref 6.4–8.3)
SODIUM SERPL-SCNC: 141 MMOL/L (ref 136–145)
TRIGL SERPL-MCNC: 75 MG/DL (ref 34–140)
VLDLC SERPL CALC-MCNC: 15 MG/DL

## 2023-04-04 PROCEDURE — 80053 COMPREHEN METABOLIC PANEL: CPT

## 2023-04-04 PROCEDURE — 83036 HEMOGLOBIN GLYCOSYLATED A1C: CPT

## 2023-04-04 PROCEDURE — 36415 COLL VENOUS BLD VENIPUNCTURE: CPT

## 2023-04-04 PROCEDURE — 80061 LIPID PANEL: CPT

## 2023-04-06 ENCOUNTER — OFFICE VISIT (OUTPATIENT)
Dept: FAMILY MEDICINE | Facility: CLINIC | Age: 58
End: 2023-04-06
Payer: COMMERCIAL

## 2023-04-06 VITALS
BODY MASS INDEX: 38.25 KG/M2 | TEMPERATURE: 97 F | RESPIRATION RATE: 19 BRPM | WEIGHT: 252.38 LBS | SYSTOLIC BLOOD PRESSURE: 138 MMHG | HEART RATE: 74 BPM | HEIGHT: 68 IN | OXYGEN SATURATION: 96 % | DIASTOLIC BLOOD PRESSURE: 80 MMHG

## 2023-04-06 DIAGNOSIS — Z11.4 ENCOUNTER FOR SCREENING FOR HIV: ICD-10-CM

## 2023-04-06 DIAGNOSIS — E78.2 MIXED HYPERLIPIDEMIA: Chronic | ICD-10-CM

## 2023-04-06 DIAGNOSIS — E11.59 HYPERTENSION ASSOCIATED WITH TYPE 2 DIABETES MELLITUS: Chronic | ICD-10-CM

## 2023-04-06 DIAGNOSIS — E78.2 MIXED DIABETIC HYPERLIPIDEMIA ASSOCIATED WITH TYPE 2 DIABETES MELLITUS: ICD-10-CM

## 2023-04-06 DIAGNOSIS — I10 PRIMARY HYPERTENSION: Primary | Chronic | ICD-10-CM

## 2023-04-06 DIAGNOSIS — N52.9 ERECTILE DYSFUNCTION, UNSPECIFIED ERECTILE DYSFUNCTION TYPE: ICD-10-CM

## 2023-04-06 DIAGNOSIS — E11.69 MIXED DIABETIC HYPERLIPIDEMIA ASSOCIATED WITH TYPE 2 DIABETES MELLITUS: ICD-10-CM

## 2023-04-06 DIAGNOSIS — Z12.5 PROSTATE CANCER SCREENING: ICD-10-CM

## 2023-04-06 DIAGNOSIS — E55.9 VITAMIN D DEFICIENCY: Chronic | ICD-10-CM

## 2023-04-06 DIAGNOSIS — Z11.59 NEED FOR HEPATITIS C SCREENING TEST: ICD-10-CM

## 2023-04-06 DIAGNOSIS — I15.2 HYPERTENSION ASSOCIATED WITH TYPE 2 DIABETES MELLITUS: Chronic | ICD-10-CM

## 2023-04-06 DIAGNOSIS — E11.65 TYPE 2 DIABETES MELLITUS WITH HYPERGLYCEMIA, WITHOUT LONG-TERM CURRENT USE OF INSULIN: Chronic | ICD-10-CM

## 2023-04-06 DIAGNOSIS — E66.01 CLASS 2 SEVERE OBESITY DUE TO EXCESS CALORIES WITH SERIOUS COMORBIDITY AND BODY MASS INDEX (BMI) OF 38.0 TO 38.9 IN ADULT: Chronic | ICD-10-CM

## 2023-04-06 PROBLEM — E11.9 LONG-TERM INSULIN USE IN TYPE 2 DIABETES: Status: RESOLVED | Noted: 2022-07-01 | Resolved: 2023-04-06

## 2023-04-06 PROBLEM — E66.812 CLASS 2 SEVERE OBESITY DUE TO EXCESS CALORIES WITH SERIOUS COMORBIDITY AND BODY MASS INDEX (BMI) OF 38.0 TO 38.9 IN ADULT: Chronic | Status: ACTIVE | Noted: 2022-07-01

## 2023-04-06 PROBLEM — Z79.4 TYPE 2 DIABETES MELLITUS WITH HYPERGLYCEMIA, WITH LONG-TERM CURRENT USE OF INSULIN: Chronic | Status: ACTIVE | Noted: 2022-06-23

## 2023-04-06 PROBLEM — Z79.4 LONG-TERM INSULIN USE IN TYPE 2 DIABETES: Status: RESOLVED | Noted: 2022-07-01 | Resolved: 2023-04-06

## 2023-04-06 PROCEDURE — 1159F PR MEDICATION LIST DOCUMENTED IN MEDICAL RECORD: ICD-10-PCS | Mod: CPTII,,, | Performed by: FAMILY MEDICINE

## 2023-04-06 PROCEDURE — 1160F RVW MEDS BY RX/DR IN RCRD: CPT | Mod: CPTII,,, | Performed by: FAMILY MEDICINE

## 2023-04-06 PROCEDURE — 99214 PR OFFICE/OUTPT VISIT, EST, LEVL IV, 30-39 MIN: ICD-10-PCS | Mod: ,,, | Performed by: FAMILY MEDICINE

## 2023-04-06 PROCEDURE — 99214 OFFICE O/P EST MOD 30 MIN: CPT | Mod: ,,, | Performed by: FAMILY MEDICINE

## 2023-04-06 PROCEDURE — 1159F MED LIST DOCD IN RCRD: CPT | Mod: CPTII,,, | Performed by: FAMILY MEDICINE

## 2023-04-06 PROCEDURE — 3008F BODY MASS INDEX DOCD: CPT | Mod: CPTII,,, | Performed by: FAMILY MEDICINE

## 2023-04-06 PROCEDURE — 3075F SYST BP GE 130 - 139MM HG: CPT | Mod: CPTII,,, | Performed by: FAMILY MEDICINE

## 2023-04-06 PROCEDURE — 1160F PR REVIEW ALL MEDS BY PRESCRIBER/CLIN PHARMACIST DOCUMENTED: ICD-10-PCS | Mod: CPTII,,, | Performed by: FAMILY MEDICINE

## 2023-04-06 PROCEDURE — 3008F PR BODY MASS INDEX (BMI) DOCUMENTED: ICD-10-PCS | Mod: CPTII,,, | Performed by: FAMILY MEDICINE

## 2023-04-06 PROCEDURE — 3079F PR MOST RECENT DIASTOLIC BLOOD PRESSURE 80-89 MM HG: ICD-10-PCS | Mod: CPTII,,, | Performed by: FAMILY MEDICINE

## 2023-04-06 PROCEDURE — 3072F PR LOW RISK FOR RETINOPATHY: ICD-10-PCS | Mod: CPTII,,, | Performed by: FAMILY MEDICINE

## 2023-04-06 PROCEDURE — 3079F DIAST BP 80-89 MM HG: CPT | Mod: CPTII,,, | Performed by: FAMILY MEDICINE

## 2023-04-06 PROCEDURE — 3072F LOW RISK FOR RETINOPATHY: CPT | Mod: CPTII,,, | Performed by: FAMILY MEDICINE

## 2023-04-06 PROCEDURE — 3075F PR MOST RECENT SYSTOLIC BLOOD PRESS GE 130-139MM HG: ICD-10-PCS | Mod: CPTII,,, | Performed by: FAMILY MEDICINE

## 2023-04-06 RX ORDER — SILDENAFIL 50 MG/1
50 TABLET, FILM COATED ORAL DAILY PRN
Qty: 10 TABLET | Refills: 2 | Status: SHIPPED | OUTPATIENT
Start: 2023-04-06 | End: 2023-10-10

## 2023-04-06 RX ORDER — DULAGLUTIDE 0.75 MG/.5ML
0.75 INJECTION, SOLUTION SUBCUTANEOUS WEEKLY
Qty: 4 PEN | Refills: 5 | Status: SHIPPED | OUTPATIENT
Start: 2023-04-06 | End: 2023-10-03

## 2023-04-06 NOTE — PROGRESS NOTES
Subjective:      Patient ID: Chadwick Lorenzo is a 57 y.o. male.    Chief Complaint: Hyperglycemia    Patient here for routine surveillance visit on chronic conditions and with acute complaints of ED    Problem List Items Addressed This Visit       Type 2 diabetes mellitus with hyperglycemia, without long-term current use of insulin (Chronic)    Relevant Medications    blood sugar diagnostic (CONTOUR NEXT TEST STRIPS) Strp    TRULICITY 0.75 mg/0.5 mL pen injector    Other Relevant Orders    Comprehensive Metabolic Panel    Lipid Panel    Hemoglobin A1C    Urinalysis    BMI 38.0-38.9,adult (Chronic)    Class 2 severe obesity due to excess calories with serious comorbidity and body mass index (BMI) of 38.0 to 38.9 in adult (Chronic)    Primary hypertension - Primary (Chronic)    Relevant Orders    CBC Auto Differential    Comprehensive Metabolic Panel    Lipid Panel    Urinalysis    Mixed hyperlipidemia (Chronic)    Relevant Orders    Comprehensive Metabolic Panel    Lipid Panel    Vitamin D deficiency (Chronic)    Relevant Orders    Vitamin D    Hypertension associated with type 2 diabetes mellitus (Chronic)    Relevant Medications    blood sugar diagnostic (CONTOUR NEXT TEST STRIPS) Strp    TRULICITY 0.75 mg/0.5 mL pen injector    Other Relevant Orders    Comprehensive Metabolic Panel    Lipid Panel    Hemoglobin A1C    Urinalysis    Mixed diabetic hyperlipidemia associated with type 2 diabetes mellitus    Relevant Medications    blood sugar diagnostic (CONTOUR NEXT TEST STRIPS) Strp    TRULICITY 0.75 mg/0.5 mL pen injector    Other Relevant Orders    Comprehensive Metabolic Panel    Lipid Panel    Hemoglobin A1C    Urinalysis     Other Visit Diagnoses       Erectile dysfunction, unspecified erectile dysfunction type        Relevant Medications    sildenafiL (VIAGRA) 50 MG tablet    Need for hepatitis C screening test        Relevant Orders    Hepatitis C Antibody    Encounter for screening for HIV        Relevant  "Orders    HIV 1/2 Ag/Ab (4th Gen)    Prostate cancer screening        Relevant Orders    PSA, Screening            The patient's Health Maintenance was reviewed and the following appears to be due:   Health Maintenance Due   Topic Date Due    Hepatitis C Screening  Never done    Pneumococcal Vaccines (Age 0-64) (1 - PCV) Never done    HIV Screening  Never done    Colorectal Cancer Screening  Never done    Shingles Vaccine (1 of 2) Never done    COVID-19 Vaccine (4 - Booster for Pfizer series) 11/22/2021       Past Medical History:  Past Medical History:   Diagnosis Date    CHF (congestive heart failure)     Diabetes mellitus, type 2     Hyperlipidemia     IGT (impaired glucose tolerance)     Kidney stones     Low testosterone in male     Migraine     Morbid obesity     Vitamin D deficiency      Past Surgical History:   Procedure Laterality Date    ANGIOGRAM, CORONARY, WITH LEFT HEART CATHETERIZATION N/A 08/01/2022    Procedure: Angiogram, Coronary, with Left Heart Cath;  Surgeon: Lev Manuel MD;  Location: [facility] CATH LAB;  Service: Cardiology;  Laterality: N/A;    kidney stones removal      rotator cuff surgery       Review of patient's allergies indicates:  No Known Allergies  Current Outpatient Medications on File Prior to Visit   Medication Sig Dispense Refill    albuterol (PROVENTIL/VENTOLIN HFA) 90 mcg/actuation inhaler Inhale 2 puffs into the lungs daily as needed for Shortness of Breath. 18 g 0    aspirin (ECOTRIN) 81 MG EC tablet Take 1 tablet (81 mg total) by mouth once daily. 90 tablet 3    atorvastatin (LIPITOR) 40 MG tablet Take 1 tablet (40 mg total) by mouth once daily. 90 tablet 3    BD ULTRA-FINE MINI PEN NEEDLE 31 gauge x 3/16" Ndle SMARTSIG:Pre-Filled Pen Syringe SUB-Q As Directed      blood-glucose meter Misc 1 Device by Misc.(Non-Drug; Combo Route) route 3 (three) times daily before meals. (Patient taking differently: 1 Device by Misc.(Non-Drug; Combo Route) route 2 (two) times a day.) 1 " "each 0    lancets Misc 1 lancet by Misc.(Non-Drug; Combo Route) route 3 (three) times daily before meals. 100 each 11    lancing device Misc 1 Device by Misc.(Non-Drug; Combo Route) route 3 (three) times daily before meals. 1 each 0    metFORMIN (GLUCOPHAGE) 500 MG tablet Take 1 tablet (500 mg total) by mouth 2 (two) times daily with meals. 60 tablet 3    metoprolol succinate (TOPROL-XL) 25 MG 24 hr tablet Take 1 tablet (25 mg total) by mouth once daily. 90 tablet 3    NURTEC 75 mg odt Take 75 mg by mouth daily as needed.      sacubitriL-valsartan (ENTRESTO) 24-26 mg per tablet Take 1 tablet by mouth 2 (two) times daily. 180 tablet 3    syringe with needle, safety 3 mL 23 gauge x 1" Syrg 1 each by Misc.(Non-Drug; Combo Route) route as needed (to administer testosterone injections IM). 25 each 0    [DISCONTINUED] CONTOUR NEXT TEST STRIPS Strp USE AS DIRECTED THREE TIMES DAILY BEFORE MEALS (Patient taking differently: 2 (two) times a day.) 100 strip 0    [DISCONTINUED] TRULICITY 0.75 mg/0.5 mL pen injector Inject 0.75 mg into the skin once a week.      ergocalciferol, vitamin D2, (VITAMIN D ORAL) Take 2,000 Units by mouth.      ondansetron (ZOFRAN-ODT) 4 MG TbDL Take 1 tablet (4 mg total) by mouth every 6 (six) hours as needed (nausea/vomiting). (Patient not taking: Reported on 4/6/2023) 20 tablet 0    testosterone cypionate (DEPOTESTOTERONE CYPIONATE) 100 mg/mL injection Inject 1 mL (100 mg total) into the muscle every 14 (fourteen) days. for 10 doses 10 mL 0    [DISCONTINUED] insulin aspart U-100 (NOVOLOG) 100 unit/mL injection Inject 6 Units into the skin 3 (three) times daily before meals. 10 mL 1     Current Facility-Administered Medications on File Prior to Visit   Medication Dose Route Frequency Provider Last Rate Last Admin    sodium chloride 0.9% flush 10 mL  10 mL Intravenous PRN Lev Manuel MD         Social History     Socioeconomic History    Marital status:     Number of children: 2 " "  Occupational History    Occupation: medical assistant    Occupation: Medical Assistant     Employer: [employer]     Comment: [employer]   Tobacco Use    Smoking status: Never    Smokeless tobacco: Never   Substance and Sexual Activity    Alcohol use: Never    Drug use: Never    Sexual activity: Yes     Partners: Female     Birth control/protection: None     Family History   Problem Relation Age of Onset    Cancer Mother     Heart disease Father        Review of Systems   Genitourinary:         Erectile dysfunction   All other systems reviewed and are negative.    Objective:   /80 (BP Location: Right arm, Patient Position: Sitting, BP Method: Large (Automatic))   Pulse 74   Temp 97.1 °F (36.2 °C) (Oral)   Resp 19   Ht 5' 8" (1.727 m)   Wt 114.5 kg (252 lb 6.4 oz)   SpO2 96%   BMI 38.38 kg/m²     Physical Exam  Vitals and nursing note reviewed.   Constitutional:       General: He is awake.      Appearance: Normal appearance. He is well-developed and well-groomed. He is morbidly obese.   HENT:      Head: Normocephalic and atraumatic.      Right Ear: Tympanic membrane, ear canal and external ear normal.      Left Ear: Tympanic membrane, ear canal and external ear normal.      Nose: Nose normal.      Mouth/Throat:      Mouth: Mucous membranes are moist.      Pharynx: Oropharynx is clear.   Eyes:      Extraocular Movements: Extraocular movements intact.      Conjunctiva/sclera: Conjunctivae normal.      Pupils: Pupils are equal, round, and reactive to light.   Cardiovascular:      Rate and Rhythm: Normal rate and regular rhythm.      Pulses: Normal pulses.      Heart sounds: Normal heart sounds.   Pulmonary:      Effort: Pulmonary effort is normal.      Breath sounds: Normal breath sounds.   Abdominal:      General: Abdomen is flat. Bowel sounds are normal.      Palpations: Abdomen is soft.   Musculoskeletal:         General: Normal range of motion.      Cervical back: Normal range of motion and neck supple. "   Skin:     General: Skin is warm and dry.      Capillary Refill: Capillary refill takes less than 2 seconds.   Neurological:      General: No focal deficit present.      Mental Status: He is alert and oriented to person, place, and time. Mental status is at baseline.   Psychiatric:         Mood and Affect: Mood normal.         Behavior: Behavior is cooperative.         Thought Content: Thought content normal.         Judgment: Judgment normal.       Procedures     Lab Visit on 04/04/2023   Component Date Value Ref Range Status    Hemoglobin A1c 04/04/2023 5.9  <=7.0 % Final    Estimated Average Glucose 04/04/2023 122.6  mg/dL Final    Sodium Level 04/04/2023 141  136 - 145 mmol/L Final    Potassium Level 04/04/2023 4.2  3.5 - 5.1 mmol/L Final    Chloride 04/04/2023 109 (H)  98 - 107 mmol/L Final    Carbon Dioxide 04/04/2023 25  22 - 29 mmol/L Final    Glucose Level 04/04/2023 104 (H)  74 - 100 mg/dL Final    Blood Urea Nitrogen 04/04/2023 10.4  8.4 - 25.7 mg/dL Final    Creatinine 04/04/2023 0.90  0.73 - 1.18 mg/dL Final    Calcium Level Total 04/04/2023 10.1  8.4 - 10.2 mg/dL Final    Protein Total 04/04/2023 7.6  6.4 - 8.3 gm/dL Final    Albumin Level 04/04/2023 4.1  3.5 - 5.0 g/dL Final    Globulin 04/04/2023 3.5  2.4 - 3.5 gm/dL Final    Albumin/Globulin Ratio 04/04/2023 1.2  1.1 - 2.0 ratio Final    Bilirubin Total 04/04/2023 0.7  <=1.5 mg/dL Final    Alkaline Phosphatase 04/04/2023 73  40 - 150 unit/L Final    Alanine Aminotransferase 04/04/2023 20  0 - 55 unit/L Final    Aspartate Aminotransferase 04/04/2023 18  5 - 34 unit/L Final    eGFR 04/04/2023 >60  mls/min/1.73/m2 Final    Cholesterol Total 04/04/2023 127  <=200 mg/dL Final    HDL Cholesterol 04/04/2023 35  35 - 60 mg/dL Final    Triglyceride 04/04/2023 75  34 - 140 mg/dL Final    Cholesterol/HDL Ratio 04/04/2023 4  0 - 5 Final    Very Low Density Lipoprotein 04/04/2023 15   Final    LDL Cholesterol 04/04/2023 77.00  50.00 - 140.00 mg/dL Final    Hospital Outpatient Visit on 01/13/2023   Component Date Value Ref Range Status    BSA 01/13/2023 2.38  m2 Final    LV LATERAL E/E' RATIO 01/13/2023 3.40  m/s Final    EF 01/13/2023 60  % Final    Left Ventricular Outflow Tract Martine* 01/13/2023 0.97  cm/s Final    Left Ventricular Outflow Tract Martine* 01/13/2023 4.16  mmHg Final    TDI LATERAL 01/13/2023 0.15  m/s Final    LVIDd 01/13/2023 3.21 (A)  3.5 - 6.0 cm Final    IVS 01/13/2023 1.25 (A)  0.6 - 1.1 cm Final    Posterior Wall 01/13/2023 0.92  0.6 - 1.1 cm Final    LVIDs 01/13/2023 2.08 (A)  2.1 - 4.0 cm Final    FS 01/13/2023 35  28 - 44 % Final    LV mass 01/13/2023 102.64  g Final    LA size 01/13/2023 3.38  cm Final    RVDD 01/13/2023 4.49  cm Final    TAPSE 01/13/2023 2.24  cm Final    Left Ventricle Relative Wall Thick* 01/13/2023 0.57  cm Final    AV regurgitation pressure 1/2 time 01/13/2023 360.23937706077858  ms Final    AV mean gradient 01/13/2023 6  mmHg Final    AV valve area 01/13/2023 2.63  cm2 Final    AV Velocity Ratio 01/13/2023 0.85   Final    AV index (prosthetic) 01/13/2023 0.81   Final    MV mean gradient 01/13/2023 1  mmHg Final    MV valve area by continuity eq 01/13/2023 4.93  cm2 Final    E/A ratio 01/13/2023 1.16   Final    E wave deceleration time 01/13/2023 281.57  msec Final    LVOT diameter 01/13/2023 2.03  cm Final    LVOT area 01/13/2023 3.2  cm2 Final    LVOT peak camila 01/13/2023 1.37  m/s Final    LVOT peak VTI 01/13/2023 25.00  cm Final    Ao peak camila 01/13/2023 1.62  m/s Final    Ao VTI 01/13/2023 30.8  cm Final    Mr max camila 01/13/2023 3.52  m/s Final    LVOT stroke volume 01/13/2023 80.87  cm3 Final    AV peak gradient 01/13/2023 10  mmHg Final    MV peak gradient 01/13/2023 2  mmHg Final    MV Peak E Camila 01/13/2023 0.51  m/s Final    AR Max Camila 01/13/2023 3.52  m/s Final    MV VTI 01/13/2023 16.4  cm Final    MV Peak A Camila 01/13/2023 0.44  m/s Final    LV Systolic Volume 01/13/2023 36.00  mL Final    LV Systolic Volume  Index 01/13/2023 15.7  mL/m2 Final    LV Diastolic Volume 01/13/2023 93.00  mL Final    LV Diastolic Volume Index 01/13/2023 40.61  mL/m2 Final    LV Mass Index 01/13/2023 45  g/m2 Final    LA Volume Index (Mod) 01/13/2023 24.0  mL/m2 Final    Mitral Valve Heart Rate 01/13/2023 79  bpm Final    LA volume (mod) 01/13/2023 55.00  cm3 Final    RA Width 01/13/2023 4.50  cm Final    TDI SEPTAL 01/13/2023 0.08  m/s Final    LV SEPTAL E/E' RATIO 01/13/2023 6.38  m/s Final    Right Atrial Pressure (from IVC) 01/13/2023 3  mmHg Final    Mean e' 01/13/2023 0.12  m/s Final    E/E' ratio 01/13/2023 4.43  m/s Final   Admission on 01/08/2023, Discharged on 01/08/2023   Component Date Value Ref Range Status    Sodium Level 01/08/2023 139  136 - 145 mmol/L Final    Potassium Level 01/08/2023 4.0  3.5 - 5.1 mmol/L Final    Chloride 01/08/2023 106  98 - 107 mmol/L Final    Carbon Dioxide 01/08/2023 22  22 - 29 mmol/L Final    Glucose Level 01/08/2023 101 (H)  74 - 100 mg/dL Final    Blood Urea Nitrogen 01/08/2023 7.3 (L)  8.4 - 25.7 mg/dL Final    Creatinine 01/08/2023 0.82  0.73 - 1.18 mg/dL Final    Calcium Level Total 01/08/2023 10.7 (H)  8.4 - 10.2 mg/dL Final    Protein Total 01/08/2023 7.9  6.4 - 8.3 gm/dL Final    Albumin Level 01/08/2023 4.5  3.5 - 5.0 g/dL Final    Globulin 01/08/2023 3.4  2.4 - 3.5 gm/dL Final    Albumin/Globulin Ratio 01/08/2023 1.3  1.1 - 2.0 ratio Final    Bilirubin Total 01/08/2023 0.7  <=1.5 mg/dL Final    Alkaline Phosphatase 01/08/2023 88  40 - 150 unit/L Final    Alanine Aminotransferase 01/08/2023 20  0 - 55 unit/L Final    Aspartate Aminotransferase 01/08/2023 20  5 - 34 unit/L Final    eGFR 01/08/2023 >90  mls/min/1.73/m2 Final    Lipase Level 01/08/2023 9  <=60 U/L Final    Lactic Acid Level 01/08/2023 2.1  0.5 - 2.2 mmol/L Final    Troponin-I 01/08/2023 <0.010  0.000 - 0.045 ng/mL Final    Color, UA 01/08/2023 Yellow  Yellow, Light-Yellow, Dark Yellow, Hannah, Straw Final    Appearance, UA  01/08/2023 Clear  Clear Final    Specific Gravity, UA 01/08/2023 1.030   Final    pH, UA 01/08/2023 8.5  5.0 - 8.5 Final    Protein, UA 01/08/2023 Trace (A)  Negative mg/dL Final    Glucose, UA 01/08/2023 Normal  Negative, Normal mg/dL Final    Ketones, UA 01/08/2023 1+ (A)  Negative mg/dL Final    Blood, UA 01/08/2023 Negative  Negative unit/L Final    Bilirubin, UA 01/08/2023 Negative  Negative mg/dL Final    Urobilinogen, UA 01/08/2023 1+ (A)  0.2, 1.0, Normal mg/dL Final    Nitrites, UA 01/08/2023 Negative  Negative Final    Leukocyte Esterase, UA 01/08/2023 Negative  Negative unit/L Final    WBC, UA 01/08/2023 0-5  None Seen, 0-2, 3-5, 0-5 /HPF Final    Bacteria, UA 01/08/2023 None Seen  None Seen /HPF Final    Squamous Epithelial Cells, UA 01/08/2023 None Seen  None Seen /HPF Final    Mucous, UA 01/08/2023 Many (A)  None Seen /LPF Final    Hyaline Casts, UA 01/08/2023 None Seen  None Seen /lpf Final    RBC, UA 01/08/2023 0-5  None Seen, 0-2, 3-5, 0-5 /HPF Final    WBC 01/08/2023 9.6  4.5 - 11.5 x10(3)/mcL Final    RBC 01/08/2023 5.21  4.70 - 6.10 x10(6)/mcL Final    Hgb 01/08/2023 14.4  14.0 - 18.0 gm/dL Final    Hct 01/08/2023 43.9  42.0 - 52.0 % Final    MCV 01/08/2023 84.3  80.0 - 94.0 fL Final    MCH 01/08/2023 27.6  pg Final    MCHC 01/08/2023 32.8 (L)  33.0 - 36.0 mg/dL Final    RDW 01/08/2023 13.8  11.6 - 14.4 % Final    Platelet 01/08/2023 404 (H)  140 - 371 x10(3)/mcL Final    MPV 01/08/2023 9.9  9.4 - 12.4 fL Final    Neut % 01/08/2023 71.1  % Final    Lymph % 01/08/2023 23.0  % Final    Mono % 01/08/2023 5.4  % Final    Eos % 01/08/2023 0.0  % Final    Basophil % 01/08/2023 0.3  % Final    Lymph # 01/08/2023 2.20  0.6 - 4.6 x10(3)/mcL Final    Neut # 01/08/2023 6.79  2.1 - 9.2 x10(3)/mcL Final    Mono # 01/08/2023 0.52  0.1 - 1.3 x10(3)/mcL Final    Eos # 01/08/2023 0.00  0 - 0.9 x10(3)/mcL Final    Baso # 01/08/2023 0.03  0 - 0.2 x10(3)/mcL Final    IG# 01/08/2023 0.02  0 - 0.04 x10(3)/mcL Final     IG% 01/08/2023 0.2  % Final    NRBC% 01/08/2023 0.0  0 - 1 % Final    Lactic Acid Level 01/08/2023 1.8  0.5 - 2.2 mmol/L Final   Lab Visit on 12/08/2022   Component Date Value Ref Range Status    Testosterone Total 12/08/2022 457.79  220.91 - 715.81 ng/dL Final   Office Visit on 11/29/2022   Component Date Value Ref Range Status    POC Rapid COVID 11/29/2022 Positive (A)  Negative Final     Acceptable 11/29/2022 Yes   Final    Rapid Influenza A Ag 11/29/2022 Negative  Negative Final    Rapid Influenza B Ag 11/29/2022 Negative  Negative Final     Acceptable 11/29/2022 Yes   Final            Assessment:     1. Primary hypertension    2. Mixed hyperlipidemia    3. Type 2 diabetes mellitus with hyperglycemia, without long-term current use of insulin    4. Hypertension associated with type 2 diabetes mellitus    5. Mixed diabetic hyperlipidemia associated with type 2 diabetes mellitus    6. Erectile dysfunction, unspecified erectile dysfunction type    7. Vitamin D deficiency    8. BMI 38.0-38.9,adult    9. Class 2 severe obesity due to excess calories with serious comorbidity and body mass index (BMI) of 38.0 to 38.9 in adult    10. Need for hepatitis C screening test    11. Encounter for screening for HIV    12. Prostate cancer screening      Plan:   I have changed Chadwick Lorenzo's CONTOUR NEXT TEST STRIPS to blood sugar diagnostic. I am also having him start on sildenafiL. Additionally, I am having him maintain his NURTEC, lancing device, lancets, blood-glucose meter, BD ULTRA-FINE MINI PEN NEEDLE, albuterol, atorvastatin, aspirin, metoprolol succinate, ENTRESTO, testosterone cypionate, (syringe with needle, safety), ondansetron, (ergocalciferol, vitamin D2, (VITAMIN D ORAL)), metFORMIN, and TRULICITY. We will continue to administer sodium chloride 0.9%.  Problem List Items Addressed This Visit       Type 2 diabetes mellitus with hyperglycemia, without long-term current use of insulin  (Chronic)    Relevant Medications    blood sugar diagnostic (CONTOUR NEXT TEST STRIPS) Strp    TRULICITY 0.75 mg/0.5 mL pen injector    Other Relevant Orders    Comprehensive Metabolic Panel    Lipid Panel    Hemoglobin A1C    Urinalysis    BMI 38.0-38.9,adult (Chronic)    Class 2 severe obesity due to excess calories with serious comorbidity and body mass index (BMI) of 38.0 to 38.9 in adult (Chronic)    Primary hypertension - Primary (Chronic)    Relevant Orders    CBC Auto Differential    Comprehensive Metabolic Panel    Lipid Panel    Urinalysis    Mixed hyperlipidemia (Chronic)    Relevant Orders    Comprehensive Metabolic Panel    Lipid Panel    Vitamin D deficiency (Chronic)    Relevant Orders    Vitamin D    Hypertension associated with type 2 diabetes mellitus (Chronic)    Relevant Medications    blood sugar diagnostic (CONTOUR NEXT TEST STRIPS) Strp    TRULICITY 0.75 mg/0.5 mL pen injector    Other Relevant Orders    Comprehensive Metabolic Panel    Lipid Panel    Hemoglobin A1C    Urinalysis    Mixed diabetic hyperlipidemia associated with type 2 diabetes mellitus    Relevant Medications    blood sugar diagnostic (CONTOUR NEXT TEST STRIPS) Strp    TRULICITY 0.75 mg/0.5 mL pen injector    Other Relevant Orders    Comprehensive Metabolic Panel    Lipid Panel    Hemoglobin A1C    Urinalysis     Other Visit Diagnoses       Erectile dysfunction, unspecified erectile dysfunction type        Relevant Medications    sildenafiL (VIAGRA) 50 MG tablet    Need for hepatitis C screening test        Relevant Orders    Hepatitis C Antibody    Encounter for screening for HIV        Relevant Orders    HIV 1/2 Ag/Ab (4th Gen)    Prostate cancer screening        Relevant Orders    PSA, Screening          No follow-ups on file.    Chadwick was seen today for hyperglycemia.    Diagnoses and all orders for this visit:    Primary hypertension  -     CBC Auto Differential; Future  -     Comprehensive Metabolic Panel; Future  -      Lipid Panel; Future  -     Urinalysis; Future  -     Urinalysis   Continue current prescription medications. Refills as needed   Condition/Symptoms controlled/stable   Surveillance labs ordered as needed, or reviewed in visit.   RTC 6 months (as scheduled) or PRN      Mixed hyperlipidemia  -     Comprehensive Metabolic Panel; Future  -     Lipid Panel; Future   Continue current prescription medications. Refills as needed   Condition/Symptoms controlled/stable   Surveillance labs ordered as needed, or reviewed in visit.   RTC 6 months (as scheduled) or PRN      Type 2 diabetes mellitus with hyperglycemia, without long-term current use of insulin  -     blood sugar diagnostic (CONTOUR NEXT TEST STRIPS) Strp; Apply 1 each topically 2 (two) times a day.  -     TRULICITY 0.75 mg/0.5 mL pen injector; Inject 0.75 mg into the skin once a week.  -     Comprehensive Metabolic Panel; Future  -     Lipid Panel; Future  -     Hemoglobin A1C; Future  -     Urinalysis; Future  -     Urinalysis   Continue current prescription medications. Refills as needed   Condition/Symptoms controlled/stable   Surveillance labs ordered as needed, or reviewed in visit.   RTC 6 months (as scheduled) or PRN      Hypertension associated with type 2 diabetes mellitus  -     blood sugar diagnostic (CONTOUR NEXT TEST STRIPS) Strp; Apply 1 each topically 2 (two) times a day.  -     TRULICITY 0.75 mg/0.5 mL pen injector; Inject 0.75 mg into the skin once a week.  -     Comprehensive Metabolic Panel; Future  -     Lipid Panel; Future  -     Hemoglobin A1C; Future  -     Urinalysis; Future  -     Urinalysis   Continue current prescription medications. Refills as needed   Condition/Symptoms controlled/stable   Surveillance labs ordered as needed, or reviewed in visit.   RTC 6 months (as scheduled) or PRN      Mixed diabetic hyperlipidemia associated with type 2 diabetes mellitus  -     blood sugar diagnostic (CONTOUR NEXT TEST STRIPS) Strp; Apply 1 each  topically 2 (two) times a day.  -     TRULICITY 0.75 mg/0.5 mL pen injector; Inject 0.75 mg into the skin once a week.  -     Comprehensive Metabolic Panel; Future  -     Lipid Panel; Future  -     Hemoglobin A1C; Future  -     Urinalysis; Future  -     Urinalysis   Continue current prescription medications. Refills as needed   Condition/Symptoms controlled/stable   Surveillance labs ordered as needed, or reviewed in visit.   RTC 6 months (as scheduled) or PRN      Erectile dysfunction, unspecified erectile dysfunction type  -     sildenafiL (VIAGRA) 50 MG tablet; Take 1 tablet (50 mg total) by mouth daily as needed for Erectile Dysfunction.    Vitamin D deficiency  -     Vitamin D; Future    BMI 38.0-38.9,adult  Class 2 severe obesity due to excess calories with serious comorbidity and body mass index (BMI) of 38.0 to 38.9 in adult   Weight loss encouraged    Need for hepatitis C screening test  -     Hepatitis C Antibody; Future    Encounter for screening for HIV  -     HIV 1/2 Ag/Ab (4th Gen); Future    Prostate cancer screening  -     PSA, Screening; Future      Medications Ordered This Encounter   Medications    blood sugar diagnostic (CONTOUR NEXT TEST STRIPS) Strp     Sig: Apply 1 each topically 2 (two) times a day.     Dispense:  200 each     Refill:  99    sildenafiL (VIAGRA) 50 MG tablet     Sig: Take 1 tablet (50 mg total) by mouth daily as needed for Erectile Dysfunction.     Dispense:  10 tablet     Refill:  2    TRULICITY 0.75 mg/0.5 mL pen injector     Sig: Inject 0.75 mg into the skin once a week.     Dispense:  4 pen     Refill:  5     [unfilled]  Orders Placed This Encounter   Procedures    CBC Auto Differential     Standing Status:   Future     Standing Expiration Date:   4/6/2024    Comprehensive Metabolic Panel     Standing Status:   Future     Standing Expiration Date:   4/6/2024    Lipid Panel     Standing Status:   Future     Standing Expiration Date:   4/6/2024    Hemoglobin A1C     Standing  "Status:   Future     Standing Expiration Date:   4/6/2024    Urinalysis     Standing Status:   Future     Number of Occurrences:   1     Standing Expiration Date:   4/6/2024    Vitamin D     Standing Status:   Future     Standing Expiration Date:   4/6/2024    PSA, Screening     Standing Status:   Future     Standing Expiration Date:   4/6/2024    Hepatitis C Antibody     Standing Status:   Future     Standing Expiration Date:   6/4/2024     Order Specific Question:   Release to patient     Answer:   Immediate    HIV 1/2 Ag/Ab (4th Gen)     Standing Status:   Future     Standing Expiration Date:   6/4/2024     Order Specific Question:   Release to patient     Answer:   Immediate       Medication List with Changes/Refills   New Medications    SILDENAFIL (VIAGRA) 50 MG TABLET    Take 1 tablet (50 mg total) by mouth daily as needed for Erectile Dysfunction.   Current Medications    ALBUTEROL (PROVENTIL/VENTOLIN HFA) 90 MCG/ACTUATION INHALER    Inhale 2 puffs into the lungs daily as needed for Shortness of Breath.    ASPIRIN (ECOTRIN) 81 MG EC TABLET    Take 1 tablet (81 mg total) by mouth once daily.    ATORVASTATIN (LIPITOR) 40 MG TABLET    Take 1 tablet (40 mg total) by mouth once daily.    BD ULTRA-FINE MINI PEN NEEDLE 31 GAUGE X 3/16" NDLE    SMARTSIG:Pre-Filled Pen Syringe SUB-Q As Directed    BLOOD-GLUCOSE METER MISC    1 Device by Misc.(Non-Drug; Combo Route) route 3 (three) times daily before meals.    ERGOCALCIFEROL, VITAMIN D2, (VITAMIN D ORAL)    Take 2,000 Units by mouth.    LANCETS MISC    1 lancet by Misc.(Non-Drug; Combo Route) route 3 (three) times daily before meals.    LANCING DEVICE MISC    1 Device by Misc.(Non-Drug; Combo Route) route 3 (three) times daily before meals.    METFORMIN (GLUCOPHAGE) 500 MG TABLET    Take 1 tablet (500 mg total) by mouth 2 (two) times daily with meals.    METOPROLOL SUCCINATE (TOPROL-XL) 25 MG 24 HR TABLET    Take 1 tablet (25 mg total) by mouth once daily.    NURTEC 75 " "MG ODT    Take 75 mg by mouth daily as needed.    ONDANSETRON (ZOFRAN-ODT) 4 MG TBDL    Take 1 tablet (4 mg total) by mouth every 6 (six) hours as needed (nausea/vomiting).    SACUBITRIL-VALSARTAN (ENTRESTO) 24-26 MG PER TABLET    Take 1 tablet by mouth 2 (two) times daily.    SYRINGE WITH NEEDLE, SAFETY 3 ML 23 GAUGE X 1" SYRG    1 each by Misc.(Non-Drug; Combo Route) route as needed (to administer testosterone injections IM).    TESTOSTERONE CYPIONATE (DEPOTESTOTERONE CYPIONATE) 100 MG/ML INJECTION    Inject 1 mL (100 mg total) into the muscle every 14 (fourteen) days. for 10 doses   Changed and/or Refilled Medications    Modified Medication Previous Medication    BLOOD SUGAR DIAGNOSTIC (CONTOUR NEXT TEST STRIPS) STRP CONTOUR NEXT TEST STRIPS Strp       Apply 1 each topically 2 (two) times a day.    USE AS DIRECTED THREE TIMES DAILY BEFORE MEALS    TRULICITY 0.75 MG/0.5 ML PEN INJECTOR TRULICITY 0.75 mg/0.5 mL pen injector       Inject 0.75 mg into the skin once a week.    Inject 0.75 mg into the skin once a week.      Medication List with Changes/Refills   New Medications    SILDENAFIL (VIAGRA) 50 MG TABLET    Take 1 tablet (50 mg total) by mouth daily as needed for Erectile Dysfunction.       Start Date: 4/6/2023  End Date: 7/5/2023   Current Medications    ALBUTEROL (PROVENTIL/VENTOLIN HFA) 90 MCG/ACTUATION INHALER    Inhale 2 puffs into the lungs daily as needed for Shortness of Breath.       Start Date: 8/1/2022  End Date: --    ASPIRIN (ECOTRIN) 81 MG EC TABLET    Take 1 tablet (81 mg total) by mouth once daily.       Start Date: 9/8/2022  End Date: 9/8/2023    ATORVASTATIN (LIPITOR) 40 MG TABLET    Take 1 tablet (40 mg total) by mouth once daily.       Start Date: 9/8/2022  End Date: 9/8/2023    BD ULTRA-FINE MINI PEN NEEDLE 31 GAUGE X 3/16" NDLE    SMARTSIG:Pre-Filled Pen Syringe SUB-Q As Directed       Start Date: 6/24/2022 End Date: --    BLOOD-GLUCOSE METER MISC    1 Device by Misc.(Non-Drug; Combo " "Route) route 3 (three) times daily before meals.       Start Date: 6/24/2022 End Date: --    ERGOCALCIFEROL, VITAMIN D2, (VITAMIN D ORAL)    Take 2,000 Units by mouth.       Start Date: --        End Date: --    LANCETS MISC    1 lancet by Misc.(Non-Drug; Combo Route) route 3 (three) times daily before meals.       Start Date: 6/24/2022 End Date: --    LANCING DEVICE MISC    1 Device by Misc.(Non-Drug; Combo Route) route 3 (three) times daily before meals.       Start Date: 6/24/2022 End Date: --    METFORMIN (GLUCOPHAGE) 500 MG TABLET    Take 1 tablet (500 mg total) by mouth 2 (two) times daily with meals.       Start Date: 2/24/2023 End Date: --    METOPROLOL SUCCINATE (TOPROL-XL) 25 MG 24 HR TABLET    Take 1 tablet (25 mg total) by mouth once daily.       Start Date: 9/8/2022  End Date: 9/8/2023    NURTEC 75 MG ODT    Take 75 mg by mouth daily as needed.       Start Date: 1/14/2022 End Date: --    ONDANSETRON (ZOFRAN-ODT) 4 MG TBDL    Take 1 tablet (4 mg total) by mouth every 6 (six) hours as needed (nausea/vomiting).       Start Date: 1/8/2023  End Date: --    SACUBITRIL-VALSARTAN (ENTRESTO) 24-26 MG PER TABLET    Take 1 tablet by mouth 2 (two) times daily.       Start Date: 9/8/2022  End Date: 9/8/2023    SYRINGE WITH NEEDLE, SAFETY 3 ML 23 GAUGE X 1" SYRG    1 each by Misc.(Non-Drug; Combo Route) route as needed (to administer testosterone injections IM).       Start Date: 10/19/2022End Date: 10/19/2023    TESTOSTERONE CYPIONATE (DEPOTESTOTERONE CYPIONATE) 100 MG/ML INJECTION    Inject 1 mL (100 mg total) into the muscle every 14 (fourteen) days. for 10 doses       Start Date: 10/5/2022 End Date: 2/9/2023   Changed and/or Refilled Medications    Modified Medication Previous Medication    BLOOD SUGAR DIAGNOSTIC (CONTOUR NEXT TEST STRIPS) STRP CONTOUR NEXT TEST STRIPS Strp       Apply 1 each topically 2 (two) times a day.    USE AS DIRECTED THREE TIMES DAILY BEFORE MEALS       Start Date: 4/6/2023  End Date: " 10/3/2023    Start Date: 7/21/2022 End Date: 4/6/2023    TRULICITY 0.75 MG/0.5 ML PEN INJECTOR TRULICITY 0.75 mg/0.5 mL pen injector       Inject 0.75 mg into the skin once a week.    Inject 0.75 mg into the skin once a week.       Start Date: 4/6/2023  End Date: 10/3/2023    Start Date: 11/28/2022End Date: 4/6/2023

## 2023-04-10 ENCOUNTER — OFFICE VISIT (OUTPATIENT)
Dept: CARDIOLOGY | Facility: CLINIC | Age: 58
End: 2023-04-10
Payer: COMMERCIAL

## 2023-04-10 VITALS
BODY MASS INDEX: 38.83 KG/M2 | SYSTOLIC BLOOD PRESSURE: 128 MMHG | HEIGHT: 68 IN | HEART RATE: 66 BPM | DIASTOLIC BLOOD PRESSURE: 79 MMHG | RESPIRATION RATE: 18 BRPM | TEMPERATURE: 99 F | WEIGHT: 256.19 LBS | OXYGEN SATURATION: 100 %

## 2023-04-10 DIAGNOSIS — E78.2 MIXED HYPERLIPIDEMIA: Chronic | ICD-10-CM

## 2023-04-10 DIAGNOSIS — I10 PRIMARY HYPERTENSION: Chronic | ICD-10-CM

## 2023-04-10 DIAGNOSIS — I42.8 NONISCHEMIC CARDIOMYOPATHY: Primary | ICD-10-CM

## 2023-04-10 PROCEDURE — 3078F PR MOST RECENT DIASTOLIC BLOOD PRESSURE < 80 MM HG: ICD-10-PCS | Mod: CPTII,,, | Performed by: NURSE PRACTITIONER

## 2023-04-10 PROCEDURE — 3008F BODY MASS INDEX DOCD: CPT | Mod: CPTII,,, | Performed by: NURSE PRACTITIONER

## 2023-04-10 PROCEDURE — 3072F PR LOW RISK FOR RETINOPATHY: ICD-10-PCS | Mod: CPTII,,, | Performed by: NURSE PRACTITIONER

## 2023-04-10 PROCEDURE — 1160F RVW MEDS BY RX/DR IN RCRD: CPT | Mod: CPTII,,, | Performed by: NURSE PRACTITIONER

## 2023-04-10 PROCEDURE — 1159F MED LIST DOCD IN RCRD: CPT | Mod: CPTII,,, | Performed by: NURSE PRACTITIONER

## 2023-04-10 PROCEDURE — 99214 PR OFFICE/OUTPT VISIT, EST, LEVL IV, 30-39 MIN: ICD-10-PCS | Mod: S$PBB,,, | Performed by: NURSE PRACTITIONER

## 2023-04-10 PROCEDURE — 1159F PR MEDICATION LIST DOCUMENTED IN MEDICAL RECORD: ICD-10-PCS | Mod: CPTII,,, | Performed by: NURSE PRACTITIONER

## 2023-04-10 PROCEDURE — 3078F DIAST BP <80 MM HG: CPT | Mod: CPTII,,, | Performed by: NURSE PRACTITIONER

## 2023-04-10 PROCEDURE — 99215 OFFICE O/P EST HI 40 MIN: CPT | Mod: PBBFAC | Performed by: NURSE PRACTITIONER

## 2023-04-10 PROCEDURE — 1160F PR REVIEW ALL MEDS BY PRESCRIBER/CLIN PHARMACIST DOCUMENTED: ICD-10-PCS | Mod: CPTII,,, | Performed by: NURSE PRACTITIONER

## 2023-04-10 PROCEDURE — 99214 OFFICE O/P EST MOD 30 MIN: CPT | Mod: S$PBB,,, | Performed by: NURSE PRACTITIONER

## 2023-04-10 PROCEDURE — 3008F PR BODY MASS INDEX (BMI) DOCUMENTED: ICD-10-PCS | Mod: CPTII,,, | Performed by: NURSE PRACTITIONER

## 2023-04-10 PROCEDURE — 3072F LOW RISK FOR RETINOPATHY: CPT | Mod: CPTII,,, | Performed by: NURSE PRACTITIONER

## 2023-04-10 PROCEDURE — 3074F SYST BP LT 130 MM HG: CPT | Mod: CPTII,,, | Performed by: NURSE PRACTITIONER

## 2023-04-10 PROCEDURE — 3074F PR MOST RECENT SYSTOLIC BLOOD PRESSURE < 130 MM HG: ICD-10-PCS | Mod: CPTII,,, | Performed by: NURSE PRACTITIONER

## 2023-04-10 NOTE — PROGRESS NOTES
CHIEF COMPLAINT:   Chief Complaint   Patient presents with    Follow-up     4 mos f/u w/echo denies all cardiac targets pt states he is not taking metoprolol                     Review of patient's allergies indicates:  No Known Allergies                                       HPI:  Chadwick Lorenzo 57 y.o. male with HFrEF/NICMO with recovered EF 60% per ECHO Jan. 2023, , DM II, IGT, who presents to cardiology clinic for routine follow-up and ongoing care.  The patient completed an echocardiogram June 2022 that revealed severely decreased systolic function, with estimated ejection fraction 25% (see report below).  He underwent a subsequent cardiac catheterization on 8.1.22 that revealed moderate CAD, 60%  to 1st Mrg lesion.  The patient had a repeat echocardiogram on 1.13.23 that revealed a recovered ejection fraction of 60%. (See full report below).     Today the patient reports that he feels great.  He denies any cardiac complaints of chest pain, shortness breath, peripheral edema, orthopnea, PND, lightheadedness dizziness or syncope.  He states he is able to mow his lawn with a push mower and denies experiencing any ischemic symptoms. Of note, the patient reports that he has lost approximately 40 lbs through continued lifestyle changes including diet and exercise.  He reports compliance with medications but states he is not taking his metoprolol.       CARDIAC TESTING:  ECHO 1.13.23  The left ventricle is normal in size with concentric remodeling and normal systolic function.  The estimated ejection fraction is 60%.  Normal left ventricular diastolic function.  Normal right ventricular size with normal right ventricular systolic function.  Mild aortic regurgitation.  Normal central venous pressure (3 mmHg).         Echo 6.22.22    Interpretation Summary  · Concentric hypertrophy and severely decreased systolic function.  · The estimated ejection fraction is 25%.  · Grade I left ventricular diastolic  dysfunction.  · Mild aortic regurgitation.  · Normal right ventricular size with normal right ventricular systolic function.  · Intermediate central venous pressure (8 mmHg).  · The estimated PA systolic pressure is 16 mmHg.  · There is severe left ventricular global hypokinesis.    Cardiac catheterization 8.1.22    Conclusion  · The pre-procedure left ventricular end diastolic pressure was 7.  · The 1st Mrg lesion was 60% stenosed.  · The estimated blood loss was none.  · There was non-obstructive coronary artery disease..    FINDINGS  The patient has Moderate CAD  Blood loss:  less than 10 cc.    RECOMMENDATIONS  Medical management  Maximize GDMT for patient's cardiomyopathy. Avoid cardiac toxins.  Risk factor modifications  Activity -- avoid straining with affected limb for one week  Exercise on regular basis.    EKG done 6/23  Normal sinus rhythm, rate 60. Normal ECG       Past Medical History:   has a past medical history of CHF (congestive heart failure), Diabetes mellitus, type 2, IGT (impaired glucose tolerance), Kidney stones, Low testosterone in male, Migraine, Morbid obesity, and Vitamin D deficiency. HLD    Past Surgical History:   has a past surgical history that includes rotator cuff surgery and kidney stones removal.     Family History:  family history includes Cancer in his mother; Heart disease in his father.     Social History:   reports that he has never smoked. He has never used smokeless tobacco. He reports previous alcohol use. He reports that he does not use drugs.                                                                                                                                                                                                                                                                                         Patient Active Problem List   Diagnosis    Nocturia    Dizziness    Fatigue    IGT (impaired glucose tolerance)    Chest pain    Asthma    Low testosterone     Mixed diabetic hyperlipidemia associated with type 2 diabetes mellitus    Type 2 diabetes mellitus with hyperglycemia, without long-term current use of insulin    Nonischemic cardiomyopathy    BMI 38.0-38.9,adult    Colon cancer screening    FH: colon cancer in relative diagnosed at >50 years old    Hx of non anemic vitamin B12 deficiency    Immunization due    Thunderclap headache    Headache    Migraine with aura    Mild intermittent asthma    Class 2 severe obesity due to excess calories with serious comorbidity and body mass index (BMI) of 38.0 to 38.9 in adult    Seasonal allergies    Severe acute respiratory syndrome coronavirus 2 (SARS-CoV-2) vaccination not indicated    Primary hypertension    Mixed hyperlipidemia    Vitamin D deficiency    Angina of effort    Hypertension associated with type 2 diabetes mellitus    Epigastric abdominal pain     Past Surgical History:   Procedure Laterality Date    ANGIOGRAM, CORONARY, WITH LEFT HEART CATHETERIZATION N/A 08/01/2022    Procedure: Angiogram, Coronary, with Left Heart Cath;  Surgeon: Lev Manuel MD;  Location: [facility] CATH LAB;  Service: Cardiology;  Laterality: N/A;    kidney stones removal      rotator cuff surgery       Social History     Socioeconomic History    Marital status:     Number of children: 2   Occupational History    Occupation: medical assistant    Occupation: Medical Assistant     Employer: [employer]     Comment: [employer]   Tobacco Use    Smoking status: Never    Smokeless tobacco: Never   Substance and Sexual Activity    Alcohol use: Never    Drug use: Never    Sexual activity: Yes     Partners: Female     Birth control/protection: None        Family History   Problem Relation Age of Onset    Cancer Mother     Heart disease Father          Current Outpatient Medications:     albuterol (PROVENTIL/VENTOLIN HFA) 90 mcg/actuation inhaler, Inhale 2 puffs into the lungs daily as needed for Shortness of Breath., Disp: 18 g, Rfl: 0    aspirin  "(ECOTRIN) 81 MG EC tablet, Take 1 tablet (81 mg total) by mouth once daily., Disp: 90 tablet, Rfl: 3    atorvastatin (LIPITOR) 40 MG tablet, Take 1 tablet (40 mg total) by mouth once daily., Disp: 90 tablet, Rfl: 3    BD ULTRA-FINE MINI PEN NEEDLE 31 gauge x 3/16" Ndle, SMARTSIG:Pre-Filled Pen Syringe SUB-Q As Directed, Disp: , Rfl:     blood sugar diagnostic (CONTOUR NEXT TEST STRIPS) Strp, Apply 1 each topically 2 (two) times a day., Disp: 200 each, Rfl: 99    blood-glucose meter Misc, 1 Device by Misc.(Non-Drug; Combo Route) route 3 (three) times daily before meals. (Patient taking differently: 1 Device by Misc.(Non-Drug; Combo Route) route 2 (two) times a day.), Disp: 1 each, Rfl: 0    ergocalciferol, vitamin D2, (VITAMIN D ORAL), Take 2,000 Units by mouth., Disp: , Rfl:     lancets Misc, 1 lancet by Misc.(Non-Drug; Combo Route) route 3 (three) times daily before meals., Disp: 100 each, Rfl: 11    lancing device Misc, 1 Device by Misc.(Non-Drug; Combo Route) route 3 (three) times daily before meals., Disp: 1 each, Rfl: 0    metFORMIN (GLUCOPHAGE) 500 MG tablet, Take 1 tablet (500 mg total) by mouth 2 (two) times daily with meals., Disp: 60 tablet, Rfl: 3    NURTEC 75 mg odt, Take 75 mg by mouth daily as needed., Disp: , Rfl:     sacubitriL-valsartan (ENTRESTO) 24-26 mg per tablet, Take 1 tablet by mouth 2 (two) times daily., Disp: 180 tablet, Rfl: 3    sildenafiL (VIAGRA) 50 MG tablet, Take 1 tablet (50 mg total) by mouth daily as needed for Erectile Dysfunction., Disp: 10 tablet, Rfl: 2    syringe with needle, safety 3 mL 23 gauge x 1" Syrg, 1 each by Misc.(Non-Drug; Combo Route) route as needed (to administer testosterone injections IM)., Disp: 25 each, Rfl: 0    TRULICITY 0.75 mg/0.5 mL pen injector, Inject 0.75 mg into the skin once a week., Disp: 4 pen, Rfl: 5    metoprolol succinate (TOPROL-XL) 25 MG 24 hr tablet, Take 1 tablet (25 mg total) by mouth once daily. (Patient not taking: Reported on 4/10/2023), " "Disp: 90 tablet, Rfl: 3    ondansetron (ZOFRAN-ODT) 4 MG TbDL, Take 1 tablet (4 mg total) by mouth every 6 (six) hours as needed (nausea/vomiting). (Patient not taking: Reported on 4/6/2023), Disp: 20 tablet, Rfl: 0    testosterone cypionate (DEPOTESTOTERONE CYPIONATE) 100 mg/mL injection, Inject 1 mL (100 mg total) into the muscle every 14 (fourteen) days. for 10 doses, Disp: 10 mL, Rfl: 0    Current Facility-Administered Medications:     sodium chloride 0.9% flush 10 mL, 10 mL, Intravenous, PRN, Lev Manuel MD     ROS:                                                                                                                                                                             Review of Systems   Constitutional: Negative.    HENT: Negative.     Eyes: Negative.    Respiratory: Negative.  Negative for shortness of breath.    Cardiovascular: Negative.    Gastrointestinal: Negative.    Genitourinary: Negative.    Musculoskeletal: Negative.    Skin: Negative.    Neurological: Negative.    Endo/Heme/Allergies: Negative.    Psychiatric/Behavioral: Negative.        Blood pressure 128/79, pulse 66, temperature 98.6 °F (37 °C), resp. rate 18, height 5' 8" (1.727 m), weight 116.2 kg (256 lb 2.8 oz), SpO2 100 %.   PE:  Physical Exam  Constitutional:       Appearance: Normal appearance.   HENT:      Head: Normocephalic.   Eyes:      Pupils: Pupils are equal, round, and reactive to light.   Cardiovascular:      Rate and Rhythm: Normal rate and regular rhythm.      Pulses: Normal pulses.   Pulmonary:      Effort: Pulmonary effort is normal.   Musculoskeletal:         General: Normal range of motion.   Skin:     General: Skin is warm and dry.   Neurological:      General: No focal deficit present.      Mental Status: He is alert and oriented to person, place, and time.   Psychiatric:         Mood and Affect: Mood normal.         Behavior: Behavior normal.              ASSESSMENT/PLAN:  NICMO/HFrEF (recovered) " NYHA Class I  - LVEF- 60% per ECHO 1.13.23 recovered -->LVEF- 25% per ECHO June 2022  - s/p [facility] - moderate CAD (60% 1st Mrg lesion ) 8.1.22  - Denies SOB, RAYO   - Euvolemic upon exam, warm and dry  - Continue GDMT: Entresto 24/26 mg BID, Metoprolol succinate 25 mg qd.  Reports   - counseled patient on low-salt diet    CAD  - s/p [facility] - moderate CAD (60% 1st Mrg lesion ) (8.1.22)  - Denies CP, SOB  - continue ASA, Atorvastatin,  BB  - instructed patient to continue with lifestyle modifications, including heart healthy, low-cholesterol diet and    HLD  - LDL -77, (goal <70)  - Continue Atorvastatin 40 mg  - Counseled on heart healthy, low-cholesterol diet activity as tolerated  - Has repeat labs     DM  - A1C- 5.9  - Continue management per PCP     Follow-up in Cardiology Clinic in 6 months or sooner if needed  Follow with PCP directed

## 2023-05-15 DIAGNOSIS — E11.65 TYPE 2 DIABETES MELLITUS WITH HYPERGLYCEMIA, WITH LONG-TERM CURRENT USE OF INSULIN: ICD-10-CM

## 2023-05-15 DIAGNOSIS — E11.69 MIXED DIABETIC HYPERLIPIDEMIA ASSOCIATED WITH TYPE 2 DIABETES MELLITUS: ICD-10-CM

## 2023-05-15 DIAGNOSIS — Z79.4 TYPE 2 DIABETES MELLITUS WITH HYPERGLYCEMIA, WITH LONG-TERM CURRENT USE OF INSULIN: ICD-10-CM

## 2023-05-15 DIAGNOSIS — I15.2 HYPERTENSION ASSOCIATED WITH TYPE 2 DIABETES MELLITUS: Chronic | ICD-10-CM

## 2023-05-15 DIAGNOSIS — E11.59 HYPERTENSION ASSOCIATED WITH TYPE 2 DIABETES MELLITUS: Chronic | ICD-10-CM

## 2023-05-15 DIAGNOSIS — E78.2 MIXED DIABETIC HYPERLIPIDEMIA ASSOCIATED WITH TYPE 2 DIABETES MELLITUS: ICD-10-CM

## 2023-05-15 RX ORDER — METFORMIN HYDROCHLORIDE 500 MG/1
500 TABLET ORAL 2 TIMES DAILY WITH MEALS
Qty: 60 TABLET | Refills: 3 | Status: SHIPPED | OUTPATIENT
Start: 2023-05-15 | End: 2023-10-16 | Stop reason: SDUPTHER

## 2023-05-15 NOTE — TELEPHONE ENCOUNTER
----- Message from Nicolas Genao sent at 5/15/2023 12:30 PM CDT -----  .Type:  Needs Medical Advice    Who Called: Chadwick  Symptoms (please be specific):    How long has patient had these symptoms:    Pharmacy name and phone #:  [facility] metFORMIN (GLUCOPHAGE) 500 MG tablet  Would the patient rather a call back or a response via [facility]?   Best Call Back Number: [phone] please leave a message, he may be with a patient.

## 2023-05-16 ENCOUNTER — CLINICAL SUPPORT (OUTPATIENT)
Dept: URGENT CARE | Facility: CLINIC | Age: 58
End: 2023-05-16
Payer: COMMERCIAL

## 2023-05-16 ENCOUNTER — OFFICE VISIT (OUTPATIENT)
Dept: URGENT CARE | Facility: CLINIC | Age: 58
End: 2023-05-16
Payer: COMMERCIAL

## 2023-05-16 VITALS
SYSTOLIC BLOOD PRESSURE: 131 MMHG | RESPIRATION RATE: 16 BRPM | HEIGHT: 68 IN | HEART RATE: 64 BPM | TEMPERATURE: 98 F | BODY MASS INDEX: 40.44 KG/M2 | OXYGEN SATURATION: 99 % | WEIGHT: 266.81 LBS | DIASTOLIC BLOOD PRESSURE: 87 MMHG

## 2023-05-16 DIAGNOSIS — J02.9 PHARYNGITIS, UNSPECIFIED ETIOLOGY: ICD-10-CM

## 2023-05-16 DIAGNOSIS — Z11.52 ENCOUNTER FOR SCREENING FOR COVID-19: Primary | ICD-10-CM

## 2023-05-16 DIAGNOSIS — R05.9 COUGH, UNSPECIFIED TYPE: Primary | ICD-10-CM

## 2023-05-16 DIAGNOSIS — R68.89 FLU-LIKE SYMPTOMS: Primary | ICD-10-CM

## 2023-05-16 LAB
B PERT.PT PRMT NPH QL NAA+NON-PROBE: NOT DETECTED
C PNEUM DNA NPH QL NAA+NON-PROBE: NOT DETECTED
CTP QC/QA: YES
HADV DNA NPH QL NAA+NON-PROBE: NOT DETECTED
HCOV 229E RNA NPH QL NAA+NON-PROBE: NOT DETECTED
HCOV HKU1 RNA NPH QL NAA+NON-PROBE: NOT DETECTED
HCOV NL63 RNA NPH QL NAA+NON-PROBE: NOT DETECTED
HCOV OC43 RNA NPH QL NAA+NON-PROBE: NOT DETECTED
HMPV RNA NPH QL NAA+NON-PROBE: NOT DETECTED
HPIV1 RNA NPH QL NAA+NON-PROBE: NOT DETECTED
HPIV2 RNA NPH QL NAA+NON-PROBE: NOT DETECTED
HPIV3 RNA NPH QL NAA+NON-PROBE: NOT DETECTED
HPIV4 RNA NPH QL NAA+NON-PROBE: NOT DETECTED
M PNEUMO DNA NPH QL NAA+NON-PROBE: NOT DETECTED
MOLECULAR STREP A: NEGATIVE
POC MOLECULAR INFLUENZA A AGN: NEGATIVE
POC MOLECULAR INFLUENZA B AGN: NEGATIVE
RSV RNA NPH QL NAA+NON-PROBE: NOT DETECTED
RV+EV RNA NPH QL NAA+NON-PROBE: NOT DETECTED
SARS-COV-2 RDRP RESP QL NAA+PROBE: NEGATIVE
STREP A PCR (OHS): NOT DETECTED

## 2023-05-16 PROCEDURE — 87651 STREP A DNA AMP PROBE: CPT | Performed by: NURSE PRACTITIONER

## 2023-05-16 PROCEDURE — 99214 PR OFFICE/OUTPT VISIT, EST, LEVL IV, 30-39 MIN: ICD-10-PCS | Mod: S$PBB,,, | Performed by: NURSE PRACTITIONER

## 2023-05-16 PROCEDURE — 99215 OFFICE O/P EST HI 40 MIN: CPT | Mod: PBBFAC | Performed by: NURSE PRACTITIONER

## 2023-05-16 PROCEDURE — 87502 INFLUENZA DNA AMP PROBE: CPT | Mod: PBBFAC | Performed by: NURSE PRACTITIONER

## 2023-05-16 PROCEDURE — 87635 SARS-COV-2 COVID-19 AMP PRB: CPT | Mod: PBBFAC

## 2023-05-16 PROCEDURE — 87633 RESP VIRUS 12-25 TARGETS: CPT | Performed by: NURSE PRACTITIONER

## 2023-05-16 PROCEDURE — 87651 STREP A DNA AMP PROBE: CPT | Mod: PBBFAC | Performed by: NURSE PRACTITIONER

## 2023-05-16 PROCEDURE — 87798 DETECT AGENT NOS DNA AMP: CPT | Performed by: NURSE PRACTITIONER

## 2023-05-16 PROCEDURE — 99214 OFFICE O/P EST MOD 30 MIN: CPT | Mod: S$PBB,,, | Performed by: NURSE PRACTITIONER

## 2023-05-16 RX ORDER — BENZONATATE 200 MG/1
200 CAPSULE ORAL 3 TIMES DAILY PRN
Qty: 30 CAPSULE | Refills: 0 | Status: SHIPPED | OUTPATIENT
Start: 2023-05-16 | End: 2023-05-26

## 2023-05-16 RX ORDER — PROMETHAZINE HYDROCHLORIDE AND DEXTROMETHORPHAN HYDROBROMIDE 6.25; 15 MG/5ML; MG/5ML
5 SYRUP ORAL EVERY 6 HOURS PRN
Qty: 100 ML | Refills: 0 | Status: SHIPPED | OUTPATIENT
Start: 2023-05-16 | End: 2023-05-21

## 2023-05-16 NOTE — PROGRESS NOTES
Subjective:      Patient ID: Chadwick Lorenzo is a 57 y.o. male.    Vitals:  vitals were not taken for this visit.     Chief Complaint: No chief complaint on file.    HPI  ROS   Objective:     Physical Exam    Assessment:     1. Encounter for screening for COVID-19        Plan:       Encounter for screening for COVID-19  -     POCT COVID-19 Rapid Screening

## 2023-05-16 NOTE — PATIENT INSTRUCTIONS
"CEPACOL Throat/Cough Lozenges.     A test called a "Respiratory PCR Panel" is being done for you today.  This is a test that is done with a nasal swab, and checks for approximately 20 viruses that can be causing your illness. It is very unlikely that this will change your treatment plan or plan of care as discussed during your visit, but in the event you need something different/additional, we will let you know through your portal zoraida, or by phone within 7 days.    See patient education for guidance.    We will notify you if your Strep test is positive.  If you need antibiotics, we will automatically send them to your pharmacy and notify you that we did that.    Otherwise, you just have a viral infection causing a sore throat that will get better on it's own. Take tylenol and motrin as you need it. Cold water, fluids.    "

## 2023-05-16 NOTE — PROGRESS NOTES
"Subjective:      Patient ID: Chadwick Lorenzo is a 57 y.o. male.    Vitals:  height is 5' 8" (1.727 m) and weight is 121 kg (266 lb 12.8 oz). His temperature is 98.2 °F (36.8 °C). His blood pressure is 131/87 and his pulse is 64. His respiration is 16 and oxygen saturation is 99%.     Chief Complaint: Sore Throat (Sore throat, chills, cough, HA since Monday. [facility] COVID test negative PTA. )    HPI as stated in CC. Onset yesterday. As stated in CC. Sore throat worst symptom. Coughed all night. States he thinks he has COVID.  ROS   Objective:     Physical Exam   Constitutional: He is oriented to person, place, and time.  Non-toxic appearance. He appears ill. No distress. obesity  HENT:   Nose: Rhinorrhea and congestion present.   Mouth/Throat: Posterior oropharyngeal erythema present. No oropharyngeal exudate.   Eyes: Conjunctivae are normal. Pupils are equal, round, and reactive to light.   Cardiovascular: Normal rate, regular rhythm and normal heart sounds.   Pulmonary/Chest: Effort normal and breath sounds normal.   Abdominal: Normal appearance.   Musculoskeletal:      Cervical back: He exhibits tenderness.   Lymphadenopathy:     He has cervical adenopathy.   Neurological: He is alert and oriented to person, place, and time.   Skin: Skin is warm, dry and not diaphoretic.   Psychiatric: His behavior is normal. Mood, judgment and thought content normal.   Nursing note and vitals reviewed.    Assessment:     1. Sore throat    2. Flu-like symptoms      Results for orders placed or performed in visit on 05/16/23   POCT Influenza A/B Molecular   Result Value Ref Range    POC Molecular Influenza A Ag Negative Negative, Not Reported    POC Molecular Influenza B Ag Negative Negative, Not Reported     Acceptable Yes    POCT Strep A, Molecular   Result Value Ref Range    Molecular Strep A, POC Negative Negative     Acceptable Yes        Plan:       Sore throat  -     POCT Influenza A/B " "Molecular  -     POCT Strep A, Molecular  -     Respiratory Panel  -     Strep Group A by PCR    Flu-like symptoms  -     POCT Influenza A/B Molecular  -     POCT Strep A, Molecular  -     Respiratory Panel  -     Strep Group A by PCR      A test called a "Respiratory PCR Panel" is being done for you today.  This is a test that is done with a nasal swab, and checks for approximately 20 viruses that can be causing your illness. It is very unlikely that this will change your treatment plan or plan of care as discussed during your visit, but in the event you need something different/additional, we will let you know through your portal zoraida, or by phone within 7 days.    See patient education for guidance.    We will notify you if your Strep test is positive.  If you need antibiotics, we will automatically send them to your pharmacy and notify you that we did that.    Otherwise, you just have a viral infection causing a sore throat that will get better on it's own. Take tylenol and motrin as you need it. Cold water, fluids.              "

## 2023-05-16 NOTE — LETTER
May 16, 2023      [facility]  [facility][1] [id number]  Phone: [phone]       Patient: Chadwick Lorenzo   YOB: 1965  Date of Visit: 05/16/2023    To Whom It May Concern:    Luzmaria Lorenzo  was at [facility] on 05/16/2023. The patient may return to work/school when results are received/reviewed. If you have any questions or concerns, or if I can be of further assistance, please do not hesitate to contact me.    Sincerely,    SINGH Barros NP

## 2023-09-05 ENCOUNTER — TELEPHONE (OUTPATIENT)
Dept: EMERGENCY MEDICINE | Facility: HOSPITAL | Age: 58
End: 2023-09-05
Payer: COMMERCIAL

## 2023-09-05 ENCOUNTER — HOSPITAL ENCOUNTER (EMERGENCY)
Facility: HOSPITAL | Age: 58
Discharge: HOME | End: 2023-09-05
Attending: STUDENT IN AN ORGANIZED HEALTH CARE EDUCATION/TRAINING PROGRAM
Payer: COMMERCIAL

## 2023-09-05 VITALS
BODY MASS INDEX: 41.66 KG/M2 | DIASTOLIC BLOOD PRESSURE: 66 MMHG | SYSTOLIC BLOOD PRESSURE: 120 MMHG | WEIGHT: 274 LBS | TEMPERATURE: 98 F | HEART RATE: 78 BPM | RESPIRATION RATE: 20 BRPM | OXYGEN SATURATION: 97 %

## 2023-09-05 DIAGNOSIS — R11.0 NAUSEA: Primary | ICD-10-CM

## 2023-09-05 DIAGNOSIS — R42 DIZZINESS: ICD-10-CM

## 2023-09-05 DIAGNOSIS — B34.9 NONSPECIFIC SYNDROME SUGGESTIVE OF VIRAL ILLNESS: ICD-10-CM

## 2023-09-05 LAB
ALBUMIN SERPL-MCNC: 3.9 G/DL (ref 3.5–5)
ALBUMIN/GLOB SERPL: 1.1 RATIO (ref 1.1–2)
ALP SERPL-CCNC: 86 UNIT/L (ref 40–150)
ALT SERPL-CCNC: 24 UNIT/L (ref 0–55)
APPEARANCE UR: ABNORMAL
AST SERPL-CCNC: 19 UNIT/L (ref 5–34)
BACTERIA #/AREA URNS AUTO: ABNORMAL /HPF
BASOPHILS # BLD AUTO: 0.04 X10(3)/MCL
BASOPHILS NFR BLD AUTO: 0.3 %
BILIRUB SERPL-MCNC: 1.5 MG/DL
BILIRUB UR QL STRIP.AUTO: NEGATIVE
BUN SERPL-MCNC: 7.4 MG/DL (ref 8.4–25.7)
CALCIUM SERPL-MCNC: 10.4 MG/DL (ref 8.4–10.2)
CHLORIDE SERPL-SCNC: 105 MMOL/L (ref 98–107)
CK MB SERPL-MCNC: <0.3 NG/ML
CK SERPL-CCNC: 103 U/L (ref 30–200)
CO2 SERPL-SCNC: 25 MMOL/L (ref 22–29)
COLOR UR: YELLOW
CREAT SERPL-MCNC: 0.93 MG/DL (ref 0.73–1.18)
EOSINOPHIL # BLD AUTO: 0.06 X10(3)/MCL (ref 0–0.9)
EOSINOPHIL NFR BLD AUTO: 0.5 %
ERYTHROCYTE [DISTWIDTH] IN BLOOD BY AUTOMATED COUNT: 13.5 % (ref 11.5–17)
FLUAV AG UPPER RESP QL IA.RAPID: NOT DETECTED
FLUBV AG UPPER RESP QL IA.RAPID: NOT DETECTED
GFR SERPLBLD CREATININE-BSD FMLA CKD-EPI: >60 MLS/MIN/1.73/M2
GLOBULIN SER-MCNC: 3.5 GM/DL (ref 2.4–3.5)
GLUCOSE SERPL-MCNC: 130 MG/DL (ref 74–100)
GLUCOSE UR QL STRIP.AUTO: NORMAL
HCT VFR BLD AUTO: 40.6 % (ref 42–52)
HGB BLD-MCNC: 12.9 G/DL (ref 14–18)
HYALINE CASTS #/AREA URNS LPF: ABNORMAL /LPF
IMM GRANULOCYTES # BLD AUTO: 0.06 X10(3)/MCL (ref 0–0.04)
IMM GRANULOCYTES NFR BLD AUTO: 0.5 %
KETONES UR QL STRIP.AUTO: NEGATIVE
LEUKOCYTE ESTERASE UR QL STRIP.AUTO: 500
LYMPHOCYTES # BLD AUTO: 2.48 X10(3)/MCL (ref 0.6–4.6)
LYMPHOCYTES NFR BLD AUTO: 20.5 %
MCH RBC QN AUTO: 27.9 PG (ref 27–31)
MCHC RBC AUTO-ENTMCNC: 31.8 G/DL (ref 33–36)
MCV RBC AUTO: 87.9 FL (ref 80–94)
MONOCYTES # BLD AUTO: 0.81 X10(3)/MCL (ref 0.1–1.3)
MONOCYTES NFR BLD AUTO: 6.7 %
MUCOUS THREADS URNS QL MICRO: ABNORMAL /LPF
NEUTROPHILS # BLD AUTO: 8.65 X10(3)/MCL (ref 2.1–9.2)
NEUTROPHILS NFR BLD AUTO: 71.5 %
NITRITE UR QL STRIP.AUTO: ABNORMAL
NRBC BLD AUTO-RTO: 0 %
PH UR STRIP.AUTO: 5.5 [PH]
PLATELET # BLD AUTO: 290 X10(3)/MCL (ref 130–400)
PMV BLD AUTO: 9.8 FL (ref 7.4–10.4)
POCT GLUCOSE: 128 MG/DL (ref 70–110)
POTASSIUM SERPL-SCNC: 3.6 MMOL/L (ref 3.5–5.1)
PROT SERPL-MCNC: 7.4 GM/DL (ref 6.4–8.3)
PROT UR QL STRIP.AUTO: ABNORMAL
RBC # BLD AUTO: 4.62 X10(6)/MCL (ref 4.7–6.1)
RBC #/AREA URNS AUTO: ABNORMAL /HPF
RBC UR QL AUTO: NEGATIVE
SARS-COV-2 RNA RESP QL NAA+PROBE: NOT DETECTED
SODIUM SERPL-SCNC: 137 MMOL/L (ref 136–145)
SP GR UR STRIP.AUTO: 1.02 (ref 1–1.03)
SQUAMOUS #/AREA URNS LPF: ABNORMAL /HPF
TROPONIN I SERPL-MCNC: <0.01 NG/ML (ref 0–0.04)
UROBILINOGEN UR STRIP-ACNC: NORMAL
WBC # SPEC AUTO: 12.1 X10(3)/MCL (ref 4.5–11.5)
WBC #/AREA URNS AUTO: >100 /HPF
WBC CLUMPS UR QL AUTO: ABNORMAL /HPF

## 2023-09-05 PROCEDURE — 81001 URINALYSIS AUTO W/SCOPE: CPT | Performed by: STUDENT IN AN ORGANIZED HEALTH CARE EDUCATION/TRAINING PROGRAM

## 2023-09-05 PROCEDURE — 63600175 PHARM REV CODE 636 W HCPCS: Performed by: STUDENT IN AN ORGANIZED HEALTH CARE EDUCATION/TRAINING PROGRAM

## 2023-09-05 PROCEDURE — 80053 COMPREHEN METABOLIC PANEL: CPT | Performed by: PHYSICIAN ASSISTANT

## 2023-09-05 PROCEDURE — 87077 CULTURE AEROBIC IDENTIFY: CPT | Performed by: STUDENT IN AN ORGANIZED HEALTH CARE EDUCATION/TRAINING PROGRAM

## 2023-09-05 PROCEDURE — 84484 ASSAY OF TROPONIN QUANT: CPT | Performed by: PHYSICIAN ASSISTANT

## 2023-09-05 PROCEDURE — 99284 EMERGENCY DEPT VISIT MOD MDM: CPT

## 2023-09-05 PROCEDURE — 96374 THER/PROPH/DIAG INJ IV PUSH: CPT

## 2023-09-05 PROCEDURE — 0240U COVID/FLU A&B PCR: CPT | Performed by: STUDENT IN AN ORGANIZED HEALTH CARE EDUCATION/TRAINING PROGRAM

## 2023-09-05 PROCEDURE — 82553 CREATINE MB FRACTION: CPT | Performed by: PHYSICIAN ASSISTANT

## 2023-09-05 PROCEDURE — 82550 ASSAY OF CK (CPK): CPT | Performed by: PHYSICIAN ASSISTANT

## 2023-09-05 PROCEDURE — 87088 URINE BACTERIA CULTURE: CPT | Performed by: STUDENT IN AN ORGANIZED HEALTH CARE EDUCATION/TRAINING PROGRAM

## 2023-09-05 PROCEDURE — 93005 ELECTROCARDIOGRAM TRACING: CPT

## 2023-09-05 PROCEDURE — 82962 GLUCOSE BLOOD TEST: CPT

## 2023-09-05 PROCEDURE — 25000003 PHARM REV CODE 250: Performed by: STUDENT IN AN ORGANIZED HEALTH CARE EDUCATION/TRAINING PROGRAM

## 2023-09-05 PROCEDURE — 85025 COMPLETE CBC W/AUTO DIFF WBC: CPT | Performed by: PHYSICIAN ASSISTANT

## 2023-09-05 RX ORDER — MECLIZINE HYDROCHLORIDE 25 MG/1
50 TABLET ORAL
Status: COMPLETED | OUTPATIENT
Start: 2023-09-05 | End: 2023-09-05

## 2023-09-05 RX ORDER — ONDANSETRON 4 MG/1
4 TABLET, ORALLY DISINTEGRATING ORAL EVERY 6 HOURS PRN
Qty: 14 TABLET | Refills: 0 | Status: SHIPPED | OUTPATIENT
Start: 2023-09-05

## 2023-09-05 RX ORDER — ONDANSETRON 2 MG/ML
4 INJECTION INTRAMUSCULAR; INTRAVENOUS
Status: COMPLETED | OUTPATIENT
Start: 2023-09-05 | End: 2023-09-05

## 2023-09-05 RX ORDER — MECLIZINE HYDROCHLORIDE 25 MG/1
25 TABLET ORAL 3 TIMES DAILY PRN
Qty: 14 TABLET | Refills: 0 | Status: SHIPPED | OUTPATIENT
Start: 2023-09-05

## 2023-09-05 RX ADMIN — MECLIZINE HYDROCHLORIDE 50 MG: 25 TABLET ORAL at 07:09

## 2023-09-05 RX ADMIN — ONDANSETRON 4 MG: 2 INJECTION INTRAMUSCULAR; INTRAVENOUS at 07:09

## 2023-09-05 NOTE — Clinical Note
"Chadwick Mata" Maura was seen and treated in our emergency department on 9/5/2023.  He may return to work on 09/06/2023.       If you have any questions or concerns, please don't hesitate to call.      Zhane FREEMAN    "

## 2023-09-05 NOTE — Clinical Note
"Chadwick Mata" Maura was seen and treated in our emergency department on 9/5/2023.  He may return to work on 09/07/2023.       If you have any questions or concerns, please don't hesitate to call.      Zhane FREEMAN    "

## 2023-09-05 NOTE — TELEPHONE ENCOUNTER
Patient informed of urine culture results and Keflex order request to be called in at [facility] in [facility] Keflex 500 mg I po 4 times daily for 5 days no refills per Dr. Yony Rockwell

## 2023-09-05 NOTE — ED PROVIDER NOTES
Encounter Date: 9/5/2023       History     Chief Complaint   Patient presents with    Dizziness     Hosp[employer] Employee w co dizziness/nausea when coming to work this am. .  EKG OBTAINED.       57-year-old male presents to ED for not feeling well for 2 days.  States it started yesterday, [employer] employee.  Reports mild dizziness and overall malaise.  Reports polyuria.  No dysuria or hematuria, no flank pain.  Significant other states he felt warm yesterday but no recorded temperature.  Reports some mild headache.  States he did not take any of his medications this morning.  Well controlled diabetic with reported A1c of 6.  No recent changes in his medications.  No obvious sick contacts.  States when he sits up he feels mildly worse. No chest pain or pressure, no cough, no HEENT complaints.  Has no other complaints or concerns at this time.    Review of patient's allergies indicates:  No Known Allergies  Past Medical History:   Diagnosis Date    CHF (congestive heart failure)     Diabetes mellitus, type 2     Hyperlipidemia     IGT (impaired glucose tolerance)     Kidney stones     Low testosterone in male     Migraine     Morbid obesity     Vitamin D deficiency      Past Surgical History:   Procedure Laterality Date    ANGIOGRAM, CORONARY, WITH LEFT HEART CATHETERIZATION N/A 08/01/2022    Procedure: Angiogram, Coronary, with Left Heart Cath;  Surgeon: Lev Manuel MD;  Location: [facility] CATH LAB;  Service: Cardiology;  Laterality: N/A;    kidney stones removal      rotator cuff surgery       Family History   Problem Relation Age of Onset    Cancer Mother     Heart disease Father      Social History     Tobacco Use    Smoking status: Never    Smokeless tobacco: Never   Substance Use Topics    Alcohol use: Never    Drug use: Never     Review of Systems   Constitutional:  Positive for fatigue and fever. Negative for chills.   HENT:  Negative for congestion, rhinorrhea and sore throat.    Eyes:   Negative for pain, discharge and itching.   Respiratory:  Negative for chest tightness and shortness of breath.    Cardiovascular:  Negative for chest pain and palpitations.   Gastrointestinal:  Negative for abdominal pain, nausea and vomiting.   Genitourinary:  Negative for dysuria and hematuria.   Musculoskeletal:  Positive for myalgias. Negative for neck pain.   Skin:  Negative for color change and rash.   Neurological:  Positive for dizziness. Negative for weakness and headaches.   Psychiatric/Behavioral:  Negative for confusion. The patient is not hyperactive.        Physical Exam     Initial Vitals [09/05/23 0712]   BP Pulse Resp Temp SpO2   125/77 83 18 97.9 °F (36.6 °C) 96 %      MAP       --         Physical Exam    Constitutional: He appears well-developed and well-nourished. He is not diaphoretic. No distress.   HENT:   Head: Normocephalic and atraumatic.   Eyes: Conjunctivae and EOM are normal. Pupils are equal, round, and reactive to light.   Neck: Neck supple. No tracheal deviation present.   Normal range of motion.  Cardiovascular:  Normal rate, regular rhythm and normal heart sounds.           Pulmonary/Chest: Breath sounds normal. No respiratory distress.   Abdominal: Abdomen is soft. There is no abdominal tenderness. There is no rebound.   Musculoskeletal:         General: No tenderness. Normal range of motion.      Cervical back: Normal range of motion and neck supple.     Neurological: He is alert and oriented to person, place, and time. He has normal strength. GCS score is 15. GCS eye subscore is 4. GCS verbal subscore is 5. GCS motor subscore is 6.   Skin: Skin is warm and dry. Capillary refill takes less than 2 seconds. No rash noted.   Psychiatric: He has a normal mood and affect. His behavior is normal. Judgment and thought content normal.       ED Course   Procedures  Labs Reviewed   CULTURE, URINE - Abnormal; Notable for the following components:       Result Value    Urine Culture >/=  100,000 colonies/ml Escherichia coli (*)     All other components within normal limits   COMPREHENSIVE METABOLIC PANEL - Abnormal; Notable for the following components:    Glucose Level 130 (*)     Blood Urea Nitrogen 7.4 (*)     Calcium Level Total 10.4 (*)     All other components within normal limits   CBC WITH DIFFERENTIAL - Abnormal; Notable for the following components:    WBC 12.10 (*)     RBC 4.62 (*)     Hgb 12.9 (*)     Hct 40.6 (*)     MCHC 31.8 (*)     IG# 0.06 (*)     All other components within normal limits   URINALYSIS, REFLEX TO URINE CULTURE - Abnormal; Notable for the following components:    Appearance, UA Turbid (*)     Protein, UA Trace (*)     Nitrites, UA 2+ (*)     Leukocyte Esterase,  (*)     WBC, UA >100 (*)     WBC Clumps, UA Few (*)     Bacteria, UA Trace (*)     Squamous Epithelial Cells, UA Trace (*)     Mucous, UA Many (*)     All other components within normal limits   POCT GLUCOSE - Abnormal; Notable for the following components:    POCT Glucose 128 (*)     All other components within normal limits   TROPONIN I - Normal   CK - Normal   CK-MB - Normal   COVID/FLU A&B PCR - Normal    Narrative:     The Xpert Xpress SARS-CoV-2/FLU/RSV plus is a rapid, multiplexed real-time PCR test intended for the simultaneous qualitative detection and differentiation of SARS-CoV-2, Influenza A, Influenza B, and respiratory syncytial virus (RSV) viral RNA in either nasopharyngeal swab or nasal swab specimens.         CBC W/ AUTO DIFFERENTIAL    Narrative:     The following orders were created for panel order CBC auto differential.  Procedure                               Abnormality         Status                     ---------                               -----------         ------                     CBC with Differential[[id number]]        Abnormal            Final result                 Please view results for these tests on the individual orders.   EXTRA TUBES    Narrative:     The following  orders were created for panel order EXTRA TUBES.  Procedure                               Abnormality         Status                     ---------                               -----------         ------                     Light Blue Top Hold[[id number]]                              In process                 Red Top Hold[[id number]]                                     In process                 Gold Top Hold[[id number]]                                    In process                   Please view results for these tests on the individual orders.   LIGHT BLUE TOP HOLD   RED TOP HOLD   GOLD TOP HOLD     EKG Readings: (Independently Interpreted)   Initial Reading: No STEMI. Rhythm: Normal Sinus Rhythm. Ectopy: No Ectopy. Conduction: Normal. Axis: Normal. Clinical Impression: Normal Sinus Rhythm     ECG Results              EKG 12-lead (Final result)  Result time 09/05/23 20:04:11      Final result by Interface, Lab In [facility] (09/05/23 20:04:11)                   Narrative:    Test Reason : R42,    Vent. Rate : 081 BPM     Atrial Rate : 081 BPM     P-R Int : 172 ms          QRS Dur : 106 ms      QT Int : 336 ms       P-R-T Axes : 033 026 050 degrees     QTc Int : 390 ms    Normal sinus rhythm  Normal ECG  Confirmed by Desmond Bangura MD ([id number]) on 9/5/2023 8:04:01 PM    Referred By: [email or web address]   SELF           Confirmed By:Desmond Bangura MD                                  Imaging Results    None          Medications   ondansetron injection 4 mg (4 mg Intravenous Given 9/5/23 0757)   meclizine tablet 50 mg (50 mg Oral Given 9/5/23 0757)     Medical Decision Making  Risk  Prescription drug management.         APC / Resident Notes:   57-year-old male presents to ED for multiple complaints including fatigue, fever and orthostatic component    Differential.  Orthostatic hypotension, electrolyte derangement, urinary tract infection, viral syndrome, fatigue    Patient in no distress.  Has a benign physical exam and grossly stable vitals.   Did not take his medication this morning however states in the department he feels grossly asymptomatic.  His workup including electrolytes, EKG, vital sign monitoring were all grossly unremarkable.  He was provided medication for the intermittent nausea and provided strict return precautions to return immediately with any worsening of symptoms. Stable for discharge and released.    There was a significant delay in obtaining his urinalysis which ultimately did return culture positive after d/c.  We did notify the patient by telephone and called in a prescription for an antibiotic for 5 days. (Moiz)                         Clinical Impression:   Final diagnoses:  [R42] Dizziness  [R11.0] Nausea (Primary)  [B34.9] Nonspecific syndrome suggestive of viral illness        ED Disposition Condition    Discharge Stable          ED Prescriptions       Medication Sig Dispense Start Date End Date Auth. Provider    meclizine (ANTIVERT) 25 mg tablet Take 1 tablet (25 mg total) by mouth 3 (three) times daily as needed for Dizziness. 14 tablet 9/5/2023 -- Yony Rockwell MD    ondansetron (ZOFRAN-ODT) 4 MG TbDL Take 1 tablet (4 mg total) by mouth every 6 (six) hours as needed (nausea/vomiting). 14 tablet 9/5/2023 -- Yony Rockwell MD          Follow-up Information       Follow up With Specialties Details Why Contact Info    Guilherme Geronimo MD Family Medicine Schedule an appointment as soon as possible for a visit   [address] [place] [address]  [address] [place] [id number] [phone]      [facility] Emergency Medicine  As needed, If symptoms worsen [address] [facility] [id number] [phone]             Yony Rockwell MD  09/13/23 101

## 2023-09-07 LAB — BACTERIA UR CULT: ABNORMAL

## 2023-10-03 NOTE — PROGRESS NOTES
CHIEF COMPLAINT:   Chief Complaint   Patient presents with    F/U 6 month visit     Denies any cardiac problems                     Review of patient's allergies indicates:  No Known Allergies                                       HPI:  Chadwick Lorenzo 58 y.o. male with HFrEF/NICMO with recovered EF 60% per ECHO Jan. 2023, DM II, IGT presents to cardiology clinic for routine follow-up and ongoing care.  Latest Echocardiogram obtained on 1.13.23 revealed an ejection fraction of 60%, which is recovered from previous EF of 25% per ECHO June 2022.  Patient underwent a left heart catheterization on 8.1.22 that revealed moderate CAD, 60% to 1st Mrg lesion.     Patient presents to clinic today denying any cardiac complaints of chest pain, shortness of breath, palpitations, orthopnea, PND, peripheral edema, lightheadedness, dizziness or syncope.  The patient states that he is able to perform his yard work which includes mowing his lawn with a self propel push mower and weed eating without experiencing any cardiac symptoms.  The patient also notes recent weight gain but does not participate in any form of regular exercise.  He reports compliance with his current medications and is tolerating without issue.        CARDIAC TESTING:  ECHO 1.13.23  The left ventricle is normal in size with concentric remodeling and normal systolic function.  The estimated ejection fraction is 60%.  Normal left ventricular diastolic function.  Normal right ventricular size with normal right ventricular systolic function.  Mild aortic regurgitation.  Normal central venous pressure (3 mmHg).       Echo 6.22.22    Interpretation Summary  · Concentric hypertrophy and severely decreased systolic function.  · The estimated ejection fraction is 25%.  · Grade I left ventricular diastolic dysfunction.  · Mild aortic regurgitation.  · Normal right ventricular size with normal right ventricular systolic function.  · Intermediate central venous  pressure (8 mmHg).  · The estimated PA systolic pressure is 16 mmHg.  · There is severe left ventricular global hypokinesis.    Cardiac catheterization 8.1.22    Conclusion  · The pre-procedure left ventricular end diastolic pressure was 7.  · The 1st Mrg lesion was 60% stenosed.  · The estimated blood loss was none.  · There was non-obstructive coronary artery disease..    FINDINGS  The patient has Moderate CAD  Blood loss:  less than 10 cc.    RECOMMENDATIONS  Medical management  Maximize GDMT for patient's cardiomyopathy. Avoid cardiac toxins.  Risk factor modifications  Activity -- avoid straining with affected limb for one week  Exercise on regular basis.    EKG done 6/23  Normal sinus rhythm, rate 60. Normal ECG       Past Medical History:   has a past medical history of CHF (congestive heart failure), Diabetes mellitus, type 2, IGT (impaired glucose tolerance), Kidney stones, Low testosterone in male, Migraine, Morbid obesity, and Vitamin D deficiency. HLD    Past Surgical History:   has a past surgical history that includes rotator cuff surgery and kidney stones removal.     Family History:  family history includes Cancer in his mother; Heart disease in his father.     Social History:   reports that he has never smoked. He has never used smokeless tobacco. He reports previous alcohol use. He reports that he does not use drugs.                                                                                                                                                                                                                                                                                         Patient Active Problem List   Diagnosis    Nocturia    Dizziness    Fatigue    IGT (impaired glucose tolerance)    Chest pain    Asthma    Low testosterone    Mixed diabetic hyperlipidemia associated with type 2 diabetes mellitus    Type 2 diabetes mellitus with hyperglycemia, without long-term current use of  insulin    Nonischemic cardiomyopathy    BMI 38.0-38.9,adult    Screen for colon cancer    FH: colon cancer in relative diagnosed at >50 years old    Hx of non anemic vitamin B12 deficiency    Immunization due    Thunderclap headache    Headache    Migraine with aura    Mild intermittent asthma    Class 2 severe obesity due to excess calories with serious comorbidity and body mass index (BMI) of 38.0 to 38.9 in adult    Seasonal allergies    Severe acute respiratory syndrome coronavirus 2 (SARS-CoV-2) vaccination not indicated    Primary hypertension    Mixed hyperlipidemia    Vitamin D deficiency    Angina of effort    Hypertension associated with type 2 diabetes mellitus    Epigastric abdominal pain     Past Surgical History:   Procedure Laterality Date    ANGIOGRAM, CORONARY, WITH LEFT HEART CATHETERIZATION N/A 08/01/2022    Procedure: Angiogram, Coronary, with Left Heart Cath;  Surgeon: Lev Manuel MD;  Location: [facility] CATH LAB;  Service: Cardiology;  Laterality: N/A;    kidney stones removal      rotator cuff surgery       Social History     Socioeconomic History    Marital status:     Number of children: 2   Occupational History    Occupation: medical assistant    Occupation: Medical Assistant     Employer: [employer]     Comment: [employer]   Tobacco Use    Smoking status: Never    Smokeless tobacco: Never   Substance and Sexual Activity    Alcohol use: Never    Drug use: Never    Sexual activity: Yes     Partners: Female     Birth control/protection: None        Family History   Problem Relation Age of Onset    Cancer Mother     Heart disease Father          Current Outpatient Medications:     albuterol (PROVENTIL/VENTOLIN HFA) 90 mcg/actuation inhaler, Inhale 2 puffs into the lungs daily as needed for Shortness of Breath., Disp: 18 g, Rfl: 0    aspirin (ECOTRIN) 81 MG EC tablet, Take 1 tablet (81 mg total) by mouth once daily., Disp: 90 tablet, Rfl: 3    atorvastatin (LIPITOR) 40 MG tablet, Take 1 tablet  "(40 mg total) by mouth once daily., Disp: 90 tablet, Rfl: 3    BD ULTRA-FINE MINI PEN NEEDLE 31 gauge x 3/16" Ndle, SMARTSIG:Pre-Filled Pen Syringe SUB-Q As Directed, Disp: , Rfl:     blood-glucose meter Misc, 1 Device by Misc.(Non-Drug; Combo Route) route 3 (three) times daily before meals. (Patient taking differently: 1 Device by Misc.(Non-Drug; Combo Route) route 2 (two) times a day.), Disp: 1 each, Rfl: 0    lancets Misc, 1 lancet by Misc.(Non-Drug; Combo Route) route 3 (three) times daily before meals., Disp: 100 each, Rfl: 11    lancing device Misc, 1 Device by Misc.(Non-Drug; Combo Route) route 3 (three) times daily before meals., Disp: 1 each, Rfl: 0    metFORMIN (GLUCOPHAGE) 500 MG tablet, Take 1 tablet (500 mg total) by mouth 2 (two) times daily with meals., Disp: 60 tablet, Rfl: 3    metoprolol succinate (TOPROL-XL) 25 MG 24 hr tablet, Take 1 tablet (25 mg total) by mouth once daily., Disp: 90 tablet, Rfl: 3    NURTEC 75 mg odt, Take 75 mg by mouth daily as needed., Disp: , Rfl:     sacubitriL-valsartan (ENTRESTO) 24-26 mg per tablet, Take 1 tablet by mouth 2 (two) times daily., Disp: , Rfl:     sildenafiL (VIAGRA) 50 MG tablet, Take 1 tablet (50 mg total) by mouth daily as needed for Erectile Dysfunction., Disp: 10 tablet, Rfl: 2    syringe with needle, safety 3 mL 23 gauge x 1" Syrg, 1 each by Misc.(Non-Drug; Combo Route) route as needed (to administer testosterone injections IM)., Disp: 25 each, Rfl: 0    ergocalciferol, vitamin D2, (VITAMIN D ORAL), Take 2,000 Units by mouth., Disp: , Rfl:     meclizine (ANTIVERT) 25 mg tablet, Take 1 tablet (25 mg total) by mouth 3 (three) times daily as needed for Dizziness. (Patient not taking: Reported on 10/10/2023), Disp: 14 tablet, Rfl: 0    ondansetron (ZOFRAN-ODT) 4 MG TbDL, Take 1 tablet (4 mg total) by mouth every 6 (six) hours as needed (nausea/vomiting). (Patient not taking: Reported on 10/10/2023), Disp: 14 tablet, Rfl: 0    testosterone cypionate " "(DEPOTESTOTERONE CYPIONATE) 100 mg/mL injection, Inject 1 mL (100 mg total) into the muscle every 14 (fourteen) days. for 10 doses (Patient not taking: Reported on 10/10/2023), Disp: 10 mL, Rfl: 0    Current Facility-Administered Medications:     sodium chloride 0.9% flush 10 mL, 10 mL, Intravenous, PRN, Lev Manuel MD     ROS:                                                                                                                                                                             Review of Systems   Constitutional: Negative.    HENT: Negative.     Eyes: Negative.    Respiratory: Negative.  Negative for shortness of breath.    Cardiovascular: Negative.    Gastrointestinal: Negative.    Genitourinary: Negative.    Musculoskeletal: Negative.    Skin: Negative.    Neurological: Negative.    Endo/Heme/Allergies: Negative.    Psychiatric/Behavioral: Negative.          Blood pressure 132/72, pulse 72, temperature 97.8 °F (36.6 °C), temperature source Oral, resp. rate 20, height 5' 8" (1.727 m), weight 125.3 kg (276 lb 3.8 oz), SpO2 98 %.   PE:  Physical Exam  Constitutional:       Appearance: Normal appearance.   HENT:      Head: Normocephalic.   Eyes:      Pupils: Pupils are equal, round, and reactive to light.   Cardiovascular:      Rate and Rhythm: Normal rate and regular rhythm.      Pulses: Normal pulses.   Pulmonary:      Effort: Pulmonary effort is normal.   Musculoskeletal:         General: Normal range of motion.   Skin:     General: Skin is warm and dry.   Neurological:      General: No focal deficit present.      Mental Status: He is alert and oriented to person, place, and time.   Psychiatric:         Mood and Affect: Mood normal.         Behavior: Behavior normal.                ASSESSMENT/PLAN:  NICMO/HFrEF (recovered) NYHA Class I  - LVEF- 60% per ECHO 1.13.23 recovered -->LVEF- 25% per ECHO June 2022  - [facility] - moderate CAD (60% 1st Mrg lesion ) 8.1.22  - Denies SOB, RAYO   - Euvolemic " upon exam, warm and dry  - Continue GDMT: Entresto 24/26 mg BID, Metoprolol succinate 25 mg qd.    - counseled patient on low-salt diet    CAD, nonobstructive   - [facility] - moderate CAD (60% 1st Mrg lesion ) (8.1.22)  - Denies any cardiac complaints   - Continue ASA, Atorvastatin,  BB  - Instructed patient to continue with lifestyle modifications, including heart healthy, low-cholesterol diet and increased exercise as tolerated     HLD  - LDL -67- at goal   - Continue Atorvastatin 40 mg  - Counseled on heart healthy, low-cholesterol diet activity as tolerated    DM II  - A1C- 6.2  - Continue management per PCP     Obesity  - Counseled on weight loss measures through diet and increased exercise as tolerated    Follow-up in Cardiology Clinic in 6 months or sooner if needed  Follow with PCP directed

## 2023-10-05 DIAGNOSIS — E78.2 MIXED DIABETIC HYPERLIPIDEMIA ASSOCIATED WITH TYPE 2 DIABETES MELLITUS: ICD-10-CM

## 2023-10-05 DIAGNOSIS — Z79.4 TYPE 2 DIABETES MELLITUS WITH HYPERGLYCEMIA, WITH LONG-TERM CURRENT USE OF INSULIN: ICD-10-CM

## 2023-10-05 DIAGNOSIS — E11.65 TYPE 2 DIABETES MELLITUS WITH HYPERGLYCEMIA, WITH LONG-TERM CURRENT USE OF INSULIN: ICD-10-CM

## 2023-10-05 DIAGNOSIS — E78.2 MIXED HYPERLIPIDEMIA: ICD-10-CM

## 2023-10-05 DIAGNOSIS — E11.69 MIXED DIABETIC HYPERLIPIDEMIA ASSOCIATED WITH TYPE 2 DIABETES MELLITUS: ICD-10-CM

## 2023-10-05 RX ORDER — ATORVASTATIN CALCIUM 40 MG/1
40 TABLET, FILM COATED ORAL DAILY
Qty: 90 TABLET | Refills: 3 | Status: SHIPPED | OUTPATIENT
Start: 2023-10-05 | End: 2023-12-21 | Stop reason: SDUPTHER

## 2023-10-05 RX ORDER — METOPROLOL SUCCINATE 25 MG/1
25 TABLET, EXTENDED RELEASE ORAL DAILY
Qty: 90 TABLET | Refills: 3 | Status: SHIPPED | OUTPATIENT
Start: 2023-10-05 | End: 2023-12-21 | Stop reason: SDUPTHER

## 2023-10-05 NOTE — PROGRESS NOTES
[facility]  [address] [facility] [address]      PATIENT NAME: Chadwick Lorenzo  : 1965  DATE: 10/11/23  MRN: [id number]      Billing Provider: Clyde Hyatt MD  Level of Service:   Patient PCP Information       Provider PCP Type    Clyde Hyatt MD General            Reason for Visit / Chief Complaint: Establish Care (Patient here to establish care with new provider)       History of Present Illness / Problem Focused Workflow     Chadwick Lorenzo presents to the clinic with Establish Care (Patient here to establish care with new provider)       Patient is here to establish care with me as his PCP. 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     No complaints today but feels like his glucose is getting out of control. States he checks his sugars twice a day.  States it runs in the 120s to 130s.     Does not do much exercise but does yardwork once every 2 weeks.    States he has migraines every now and then. Has seen neuro and gets nurtec which helps.     States that he has low vit d and low testosterone. Was taking shots but it has been a couple months.     PMH: HFrEF/NICMO with recovered EF 60% per echo in 2023, DM, kidney stones, low testosterone, migraines, morbid obesity, vit d def, hld    Past Surgical History:   has a past surgical history that includes right rotator cuff surgery and kidney stones removal.      Family History:  family history includes Cancer in his mother; Heart disease in his father.   Mother has colon cancer - diagnosed 48.  Maternal grandmother - cancer (unknown)  Father - CAD     Social History:   reports that he has never smoked. He has never used smokeless tobacco. He reports previous alcohol use. He reports that he does not use drugs.     Review of Systems     10-point ROS performed and negative except as above.      Medical / Social / Family History     Past Medical History:   Diagnosis Date    CHF  "(congestive heart failure)     Diabetes mellitus, type 2     Hyperlipidemia     IGT (impaired glucose tolerance)     Kidney stones     Low testosterone in male     Migraine     Morbid obesity     Vitamin D deficiency        Past Surgical History:   Procedure Laterality Date    ANGIOGRAM, CORONARY, WITH LEFT HEART CATHETERIZATION N/A 08/01/2022    Procedure: Angiogram, Coronary, with Left Heart Cath;  Surgeon: Lev Manuel MD;  Location: [facility] CATH LAB;  Service: Cardiology;  Laterality: N/A;    kidney stones removal      rotator cuff surgery         Social History    reports that he has never smoked. He has never used smokeless tobacco. He reports that he does not drink alcohol and does not use drugs.    Family History  's family history includes Cancer in his mother; Heart disease in his father; No Known Problems in his brother and sister.    Medications and Allergies     Medications  Outpatient Medications Marked as Taking for the 10/11/23 encounter (Office Visit) with Clyde Hyatt MD   Medication Sig Dispense Refill    albuterol (PROVENTIL/VENTOLIN HFA) 90 mcg/actuation inhaler Inhale 2 puffs into the lungs daily as needed for Shortness of Breath. 18 g 0    atorvastatin (LIPITOR) 40 MG tablet Take 1 tablet (40 mg total) by mouth once daily. 90 tablet 3    BD ULTRA-FINE MINI PEN NEEDLE 31 gauge x 3/16" Ndle SMARTSIG:Pre-Filled Pen Syringe SUB-Q As Directed      blood-glucose meter Misc 1 Device by Misc.(Non-Drug; Combo Route) route 3 (three) times daily before meals. (Patient taking differently: 1 Device by Misc.(Non-Drug; Combo Route) route 2 (two) times a day.) 1 each 0    ergocalciferol, vitamin D2, (VITAMIN D ORAL) Take 2,000 Units by mouth.      lancets Misc 1 lancet by Misc.(Non-Drug; Combo Route) route 3 (three) times daily before meals. 100 each 11    lancing device Misc 1 Device by Misc.(Non-Drug; Combo Route) route 3 (three) times daily before meals. 1 each 0    meclizine (ANTIVERT) 25 " "mg tablet Take 1 tablet (25 mg total) by mouth 3 (three) times daily as needed for Dizziness. 14 tablet 0    metFORMIN (GLUCOPHAGE) 500 MG tablet Take 1 tablet (500 mg total) by mouth 2 (two) times daily with meals. 60 tablet 3    metoprolol succinate (TOPROL-XL) 25 MG 24 hr tablet Take 1 tablet (25 mg total) by mouth once daily. 90 tablet 3    NURTEC 75 mg odt Take 75 mg by mouth daily as needed.      ondansetron (ZOFRAN-ODT) 4 MG TbDL Take 1 tablet (4 mg total) by mouth every 6 (six) hours as needed (nausea/vomiting). 14 tablet 0    sacubitriL-valsartan (ENTRESTO) 24-26 mg per tablet Take 1 tablet by mouth 2 (two) times daily.      syringe with needle, safety 3 mL 23 gauge x 1" Syrg 1 each by Misc.(Non-Drug; Combo Route) route as needed (to administer testosterone injections IM). 25 each 0     Current Facility-Administered Medications for the 10/11/23 encounter (Office Visit) with Clyde Hyatt MD   Medication Dose Route Frequency Provider Last Rate Last Admin    sodium chloride 0.9% flush 10 mL  10 mL Intravenous PRN Lev Manuel MD           Allergies  Review of patient's allergies indicates:  No Known Allergies    Physical Examination     Vitals:    10/11/23 0747   BP: 127/79   Pulse: 67   Resp: 16   Temp: 98.4 °F (36.9 °C)     General: AAOx3, NAD, alert and cooperative  HEENT:  EOMI, normal conjunctiva  Neck: no LAD, no JVD, supple, no masses or thyromegaly  CVS: S1/S2 nml, RRR, mild systolic murmur, rubs or gallops,  Resp: CTA B/L, no rhonchi, rales, or wheezing  GI: no TTP, not distended, BS+, obese  Skin: not jaundiced, warm, no rashes  Musculoskeletal: normal ROM, no joint tenderness, normal muscular development  Protective Sensation (w/ 10 gram monofilament):  Right: Intact  Left: Intact    Visual Inspection:  Normal -  Bilateral    Pedal Pulses:   Right: Present  Left: Present    Posterior Tibialis Pulses:   Right:Present  Left: Present    Extremities: no peripheral edema, peripheral pulses " intact  Neuro: CN II-XII grossly intact, strength and sensation symmetric and intact throughout, no focal neurological deficits        Results   Reviewed recent labs.        Assessment and Plan (including Health Maintenance)     Plan:   There are no diagnoses linked to this encounter.    NICMO/HFeEF recovered NYHA Class 1  Follows with Cards  Echo on 1/13/23 showed EF of 60%  Continue with entresto 24/26 mg bid, metoprolol 25 mg qd  Will start jardiance    CAD  LHC on 8/1/22. Showed moderate CAD  Continue Aspirin, atorvastatin, metoprolol    HLD  LDL was 67 on 10/9/23  HDL 37  Continue with atorvastatin  Recommend omega fatty acid (fish oil)    Diabetes Mellitus  A1c 6.2  Continue metformin 500 mg bid.  Will add mounjaro 2.5 mg qweek for 4 weeks and assess  Also adding jardiance  On an ARB (entresto)  Will order urine protein/cr  Foot exam today  Will be seeing eye doctor soon    ED  On sildenafil    Vit D Def  Vit D level is 21  Recommend 800 IU of Vit D daily  Will recheck    Testosterone Def  Will recheck levels (testosterone, LH, FSH)    Immunizations and Health maintenance  Will get flu shot provided by Gwyn bustamante  Pneumovax today  No need for AAA screening (never smoker)  No need for CT low dose (never smoker)  PSA screen done - wn  Colonoscopy - Colonsocpy done this year at [facility]. Will try to get records.  Recommend exercise - start with 20 minute walks daily.      F/u in 3 months.    Health Maintenance Due   Topic Date Due    Pneumococcal Vaccines (Age 0-64) (1 - PCV) Never done    Shingles Vaccine (1 of 2) Never done    Foot Exam  07/01/2023    Eye Exam  07/28/2023    Influenza Vaccine (1) 09/01/2023    COVID-19 Vaccine (4 - 2023-24 season) 09/01/2023    Diabetes Urine Screening  09/20/2023         Health Maintenance Topics with due status: Not Due       Topic Last Completion Date    TETANUS VACCINE 06/05/2020    Colorectal Cancer Screening 02/27/2023    Lipid Panel 10/09/2023    Hemoglobin A1c  10/09/2023    High Dose Statin 10/10/2023    Aspirin/Antiplatelet Therapy 10/10/2023       No follow-ups on file.     Future Appointments   Date Time Provider Department Center   4/12/2024  9:15 AM Paulina Banuelos FNP [facility]            Signature:  Clyde Hyatt MD  [facility]  [address] [facility] [address]    Date of encounter: 10/11/23

## 2023-10-09 ENCOUNTER — LAB VISIT (OUTPATIENT)
Dept: LAB | Facility: HOSPITAL | Age: 58
End: 2023-10-09
Attending: FAMILY MEDICINE
Payer: COMMERCIAL

## 2023-10-09 DIAGNOSIS — E78.2 MIXED HYPERLIPIDEMIA: Chronic | ICD-10-CM

## 2023-10-09 DIAGNOSIS — E11.59 HYPERTENSION ASSOCIATED WITH TYPE 2 DIABETES MELLITUS: Chronic | ICD-10-CM

## 2023-10-09 DIAGNOSIS — Z11.4 ENCOUNTER FOR SCREENING FOR HIV: ICD-10-CM

## 2023-10-09 DIAGNOSIS — Z11.59 NEED FOR HEPATITIS C SCREENING TEST: ICD-10-CM

## 2023-10-09 DIAGNOSIS — Z12.5 PROSTATE CANCER SCREENING: ICD-10-CM

## 2023-10-09 DIAGNOSIS — E55.9 VITAMIN D DEFICIENCY: Chronic | ICD-10-CM

## 2023-10-09 DIAGNOSIS — I15.2 HYPERTENSION ASSOCIATED WITH TYPE 2 DIABETES MELLITUS: Chronic | ICD-10-CM

## 2023-10-09 DIAGNOSIS — E11.00 DIABETES MELLITUS WITH NONKETOTIC HYPEROSMOLARITY: Primary | ICD-10-CM

## 2023-10-09 DIAGNOSIS — E11.65 TYPE 2 DIABETES MELLITUS WITH HYPERGLYCEMIA, WITHOUT LONG-TERM CURRENT USE OF INSULIN: Chronic | ICD-10-CM

## 2023-10-09 DIAGNOSIS — I10 PRIMARY HYPERTENSION: ICD-10-CM

## 2023-10-09 DIAGNOSIS — E78.2 MIXED DIABETIC HYPERLIPIDEMIA ASSOCIATED WITH TYPE 2 DIABETES MELLITUS: ICD-10-CM

## 2023-10-09 DIAGNOSIS — E11.69 MIXED DIABETIC HYPERLIPIDEMIA ASSOCIATED WITH TYPE 2 DIABETES MELLITUS: ICD-10-CM

## 2023-10-09 LAB
ALBUMIN SERPL-MCNC: 3.9 G/DL (ref 3.5–5)
ALBUMIN/GLOB SERPL: 1.1 RATIO (ref 1.1–2)
ALP SERPL-CCNC: 85 UNIT/L (ref 40–150)
ALT SERPL-CCNC: 20 UNIT/L (ref 0–55)
AST SERPL-CCNC: 17 UNIT/L (ref 5–34)
BASOPHILS # BLD AUTO: 0.04 X10(3)/MCL
BASOPHILS NFR BLD AUTO: 0.6 %
BILIRUB SERPL-MCNC: 0.6 MG/DL
BUN SERPL-MCNC: 9.9 MG/DL (ref 8.4–25.7)
CALCIUM SERPL-MCNC: 10.2 MG/DL (ref 8.4–10.2)
CHLORIDE SERPL-SCNC: 108 MMOL/L (ref 98–107)
CHOLEST SERPL-MCNC: 119 MG/DL
CHOLEST/HDLC SERPL: 3 {RATIO} (ref 0–5)
CO2 SERPL-SCNC: 27 MMOL/L (ref 22–29)
CREAT SERPL-MCNC: 0.78 MG/DL (ref 0.73–1.18)
DEPRECATED CALCIDIOL+CALCIFEROL SERPL-MC: 21 NG/ML (ref 30–80)
EOSINOPHIL # BLD AUTO: 0.3 X10(3)/MCL (ref 0–0.9)
EOSINOPHIL NFR BLD AUTO: 4.4 %
ERYTHROCYTE [DISTWIDTH] IN BLOOD BY AUTOMATED COUNT: 13.5 % (ref 11.5–17)
EST. AVERAGE GLUCOSE BLD GHB EST-MCNC: 131.2 MG/DL
GFR SERPLBLD CREATININE-BSD FMLA CKD-EPI: >60 MLS/MIN/1.73/M2
GLOBULIN SER-MCNC: 3.4 GM/DL (ref 2.4–3.5)
GLUCOSE SERPL-MCNC: 125 MG/DL (ref 74–100)
HBA1C MFR BLD: 6.2 %
HCT VFR BLD AUTO: 43.4 % (ref 42–52)
HCV AB SERPL QL IA: NONREACTIVE
HDLC SERPL-MCNC: 37 MG/DL (ref 35–60)
HGB BLD-MCNC: 13.5 G/DL (ref 14–18)
HIV 1+2 AB+HIV1 P24 AG SERPL QL IA: NONREACTIVE
IMM GRANULOCYTES # BLD AUTO: 0.02 X10(3)/MCL (ref 0–0.04)
IMM GRANULOCYTES NFR BLD AUTO: 0.3 %
LDLC SERPL CALC-MCNC: 67 MG/DL (ref 50–140)
LYMPHOCYTES # BLD AUTO: 3.75 X10(3)/MCL (ref 0.6–4.6)
LYMPHOCYTES NFR BLD AUTO: 55.1 %
MCH RBC QN AUTO: 27.6 PG (ref 27–31)
MCHC RBC AUTO-ENTMCNC: 31.1 G/DL (ref 33–36)
MCV RBC AUTO: 88.6 FL (ref 80–94)
MONOCYTES # BLD AUTO: 0.4 X10(3)/MCL (ref 0.1–1.3)
MONOCYTES NFR BLD AUTO: 5.9 %
NEUTROPHILS # BLD AUTO: 2.3 X10(3)/MCL (ref 2.1–9.2)
NEUTROPHILS NFR BLD AUTO: 33.7 %
NRBC BLD AUTO-RTO: 0 %
PLATELET # BLD AUTO: 303 X10(3)/MCL (ref 130–400)
PMV BLD AUTO: 9.6 FL (ref 7.4–10.4)
POTASSIUM SERPL-SCNC: 4.1 MMOL/L (ref 3.5–5.1)
PROT SERPL-MCNC: 7.3 GM/DL (ref 6.4–8.3)
PSA SERPL-MCNC: 1.51 NG/ML
RBC # BLD AUTO: 4.9 X10(6)/MCL (ref 4.7–6.1)
SODIUM SERPL-SCNC: 140 MMOL/L (ref 136–145)
TRIGL SERPL-MCNC: 74 MG/DL (ref 34–140)
VLDLC SERPL CALC-MCNC: 15 MG/DL
WBC # SPEC AUTO: 6.81 X10(3)/MCL (ref 4.5–11.5)

## 2023-10-09 PROCEDURE — 80061 LIPID PANEL: CPT

## 2023-10-09 PROCEDURE — 80053 COMPREHEN METABOLIC PANEL: CPT

## 2023-10-09 PROCEDURE — 83036 HEMOGLOBIN GLYCOSYLATED A1C: CPT

## 2023-10-09 PROCEDURE — 87389 HIV-1 AG W/HIV-1&-2 AB AG IA: CPT

## 2023-10-09 PROCEDURE — 36415 COLL VENOUS BLD VENIPUNCTURE: CPT

## 2023-10-09 PROCEDURE — 82306 VITAMIN D 25 HYDROXY: CPT

## 2023-10-09 PROCEDURE — 85025 COMPLETE CBC W/AUTO DIFF WBC: CPT

## 2023-10-09 PROCEDURE — 84153 ASSAY OF PSA TOTAL: CPT

## 2023-10-09 PROCEDURE — 86803 HEPATITIS C AB TEST: CPT

## 2023-10-10 ENCOUNTER — OFFICE VISIT (OUTPATIENT)
Dept: CARDIOLOGY | Facility: CLINIC | Age: 58
End: 2023-10-10
Payer: COMMERCIAL

## 2023-10-10 VITALS
HEART RATE: 72 BPM | SYSTOLIC BLOOD PRESSURE: 132 MMHG | WEIGHT: 276.25 LBS | OXYGEN SATURATION: 98 % | DIASTOLIC BLOOD PRESSURE: 72 MMHG | RESPIRATION RATE: 20 BRPM | BODY MASS INDEX: 41.87 KG/M2 | TEMPERATURE: 98 F | HEIGHT: 68 IN

## 2023-10-10 DIAGNOSIS — I10 PRIMARY HYPERTENSION: Chronic | ICD-10-CM

## 2023-10-10 DIAGNOSIS — E11.59 HYPERTENSION ASSOCIATED WITH TYPE 2 DIABETES MELLITUS: Chronic | ICD-10-CM

## 2023-10-10 DIAGNOSIS — I15.2 HYPERTENSION ASSOCIATED WITH TYPE 2 DIABETES MELLITUS: Chronic | ICD-10-CM

## 2023-10-10 DIAGNOSIS — I42.8 NONISCHEMIC CARDIOMYOPATHY: Primary | ICD-10-CM

## 2023-10-10 PROCEDURE — 99214 PR OFFICE/OUTPT VISIT, EST, LEVL IV, 30-39 MIN: ICD-10-PCS | Mod: S$PBB,,, | Performed by: NURSE PRACTITIONER

## 2023-10-10 PROCEDURE — 1159F PR MEDICATION LIST DOCUMENTED IN MEDICAL RECORD: ICD-10-PCS | Mod: CPTII,,, | Performed by: NURSE PRACTITIONER

## 2023-10-10 PROCEDURE — 1160F RVW MEDS BY RX/DR IN RCRD: CPT | Mod: CPTII,,, | Performed by: NURSE PRACTITIONER

## 2023-10-10 PROCEDURE — 99215 OFFICE O/P EST HI 40 MIN: CPT | Mod: PBBFAC | Performed by: NURSE PRACTITIONER

## 2023-10-10 PROCEDURE — 3078F DIAST BP <80 MM HG: CPT | Mod: CPTII,,, | Performed by: NURSE PRACTITIONER

## 2023-10-10 PROCEDURE — 3075F PR MOST RECENT SYSTOLIC BLOOD PRESS GE 130-139MM HG: ICD-10-PCS | Mod: CPTII,,, | Performed by: NURSE PRACTITIONER

## 2023-10-10 PROCEDURE — 3072F PR LOW RISK FOR RETINOPATHY: ICD-10-PCS | Mod: CPTII,,, | Performed by: NURSE PRACTITIONER

## 2023-10-10 PROCEDURE — 4010F PR ACE/ARB THEARPY RXD/TAKEN: ICD-10-PCS | Mod: CPTII,,, | Performed by: NURSE PRACTITIONER

## 2023-10-10 PROCEDURE — 3008F PR BODY MASS INDEX (BMI) DOCUMENTED: ICD-10-PCS | Mod: CPTII,,, | Performed by: NURSE PRACTITIONER

## 2023-10-10 PROCEDURE — 3072F LOW RISK FOR RETINOPATHY: CPT | Mod: CPTII,,, | Performed by: NURSE PRACTITIONER

## 2023-10-10 PROCEDURE — 3044F HG A1C LEVEL LT 7.0%: CPT | Mod: CPTII,,, | Performed by: NURSE PRACTITIONER

## 2023-10-10 PROCEDURE — 4010F ACE/ARB THERAPY RXD/TAKEN: CPT | Mod: CPTII,,, | Performed by: NURSE PRACTITIONER

## 2023-10-10 PROCEDURE — 99214 OFFICE O/P EST MOD 30 MIN: CPT | Mod: S$PBB,,, | Performed by: NURSE PRACTITIONER

## 2023-10-10 PROCEDURE — 3044F PR MOST RECENT HEMOGLOBIN A1C LEVEL <7.0%: ICD-10-PCS | Mod: CPTII,,, | Performed by: NURSE PRACTITIONER

## 2023-10-10 PROCEDURE — 3075F SYST BP GE 130 - 139MM HG: CPT | Mod: CPTII,,, | Performed by: NURSE PRACTITIONER

## 2023-10-10 PROCEDURE — 3078F PR MOST RECENT DIASTOLIC BLOOD PRESSURE < 80 MM HG: ICD-10-PCS | Mod: CPTII,,, | Performed by: NURSE PRACTITIONER

## 2023-10-10 PROCEDURE — 3008F BODY MASS INDEX DOCD: CPT | Mod: CPTII,,, | Performed by: NURSE PRACTITIONER

## 2023-10-10 PROCEDURE — 1159F MED LIST DOCD IN RCRD: CPT | Mod: CPTII,,, | Performed by: NURSE PRACTITIONER

## 2023-10-10 PROCEDURE — 1160F PR REVIEW ALL MEDS BY PRESCRIBER/CLIN PHARMACIST DOCUMENTED: ICD-10-PCS | Mod: CPTII,,, | Performed by: NURSE PRACTITIONER

## 2023-10-10 RX ORDER — SACUBITRIL AND VALSARTAN 24; 26 MG/1; MG/1
1 TABLET, FILM COATED ORAL 2 TIMES DAILY
COMMUNITY
End: 2023-12-21 | Stop reason: SDUPTHER

## 2023-10-10 NOTE — PATIENT INSTRUCTIONS
Follow-up in Cardiology Clinic in 6 months or sooner if needed  Increase Exercise as Tolerated   Follow with PCP directed

## 2023-10-11 ENCOUNTER — OFFICE VISIT (OUTPATIENT)
Dept: INTERNAL MEDICINE | Facility: CLINIC | Age: 58
End: 2023-10-11
Payer: COMMERCIAL

## 2023-10-11 VITALS
HEIGHT: 68 IN | RESPIRATION RATE: 16 BRPM | BODY MASS INDEX: 41.83 KG/M2 | WEIGHT: 276 LBS | OXYGEN SATURATION: 97 % | DIASTOLIC BLOOD PRESSURE: 79 MMHG | SYSTOLIC BLOOD PRESSURE: 127 MMHG | TEMPERATURE: 98 F | HEART RATE: 67 BPM

## 2023-10-11 DIAGNOSIS — E11.65 TYPE 2 DIABETES MELLITUS WITH HYPERGLYCEMIA, WITH LONG-TERM CURRENT USE OF INSULIN: ICD-10-CM

## 2023-10-11 DIAGNOSIS — E66.01 SEVERE OBESITY (BMI >= 40): ICD-10-CM

## 2023-10-11 DIAGNOSIS — G43.101 MIGRAINE WITH AURA AND WITH STATUS MIGRAINOSUS, NOT INTRACTABLE: ICD-10-CM

## 2023-10-11 DIAGNOSIS — R79.89 LOW TESTOSTERONE: ICD-10-CM

## 2023-10-11 DIAGNOSIS — Z23 NEED FOR PNEUMOCOCCAL 20-VALENT CONJUGATE VACCINATION: ICD-10-CM

## 2023-10-11 DIAGNOSIS — E11.00 TYPE 2 DIABETES MELLITUS WITH HYPEROSMOLARITY WITHOUT COMA, WITHOUT LONG-TERM CURRENT USE OF INSULIN: ICD-10-CM

## 2023-10-11 DIAGNOSIS — E11.69 MIXED DIABETIC HYPERLIPIDEMIA ASSOCIATED WITH TYPE 2 DIABETES MELLITUS: ICD-10-CM

## 2023-10-11 DIAGNOSIS — E55.9 VITAMIN D DEFICIENCY: Primary | ICD-10-CM

## 2023-10-11 DIAGNOSIS — E11.59 HYPERTENSION ASSOCIATED WITH TYPE 2 DIABETES MELLITUS: Chronic | ICD-10-CM

## 2023-10-11 DIAGNOSIS — E78.2 MIXED DIABETIC HYPERLIPIDEMIA ASSOCIATED WITH TYPE 2 DIABETES MELLITUS: ICD-10-CM

## 2023-10-11 DIAGNOSIS — Z79.4 TYPE 2 DIABETES MELLITUS WITH HYPERGLYCEMIA, WITH LONG-TERM CURRENT USE OF INSULIN: ICD-10-CM

## 2023-10-11 DIAGNOSIS — I50.22 CHRONIC SYSTOLIC CONGESTIVE HEART FAILURE: ICD-10-CM

## 2023-10-11 DIAGNOSIS — I15.2 HYPERTENSION ASSOCIATED WITH TYPE 2 DIABETES MELLITUS: Chronic | ICD-10-CM

## 2023-10-11 PROCEDURE — 90677 PCV20 VACCINE IM: CPT | Mod: PBBFAC

## 2023-10-11 PROCEDURE — 99214 OFFICE O/P EST MOD 30 MIN: CPT | Mod: PBBFAC,25 | Performed by: INTERNAL MEDICINE

## 2023-10-11 RX ORDER — RIMEGEPANT SULFATE 75 MG/75MG
75 TABLET, ORALLY DISINTEGRATING ORAL DAILY PRN
Qty: 8 TABLET | Refills: 1 | Status: SHIPPED | OUTPATIENT
Start: 2023-10-11

## 2023-10-11 RX ORDER — RIMEGEPANT SULFATE 75 MG/75MG
75 TABLET, ORALLY DISINTEGRATING ORAL DAILY PRN
Qty: 30 TABLET | Refills: 1 | Status: SHIPPED | OUTPATIENT
Start: 2023-10-11 | End: 2023-10-11 | Stop reason: SDUPTHER

## 2023-10-11 RX ORDER — TIRZEPATIDE 2.5 MG/.5ML
2.5 INJECTION, SOLUTION SUBCUTANEOUS
Qty: 4 PEN | Refills: 0 | Status: SHIPPED | OUTPATIENT
Start: 2023-10-11 | End: 2023-10-30

## 2023-10-16 RX ORDER — METFORMIN HYDROCHLORIDE 500 MG/1
500 TABLET ORAL 2 TIMES DAILY WITH MEALS
Qty: 60 TABLET | Refills: 3 | Status: SHIPPED | OUTPATIENT
Start: 2023-10-16 | End: 2024-03-01 | Stop reason: SDUPTHER

## 2023-10-30 RX ORDER — TIRZEPATIDE 5 MG/.5ML
5 INJECTION, SOLUTION SUBCUTANEOUS
Qty: 4 PEN | Refills: 0 | Status: SHIPPED | OUTPATIENT
Start: 2023-10-30 | End: 2023-11-21

## 2023-11-30 DIAGNOSIS — E11.00 TYPE 2 DIABETES MELLITUS WITH HYPEROSMOLARITY WITHOUT COMA, WITHOUT LONG-TERM CURRENT USE OF INSULIN: Primary | ICD-10-CM

## 2023-12-21 DIAGNOSIS — E11.69 MIXED DIABETIC HYPERLIPIDEMIA ASSOCIATED WITH TYPE 2 DIABETES MELLITUS: ICD-10-CM

## 2023-12-21 DIAGNOSIS — E11.65 TYPE 2 DIABETES MELLITUS WITH HYPERGLYCEMIA, WITH LONG-TERM CURRENT USE OF INSULIN: ICD-10-CM

## 2023-12-21 DIAGNOSIS — Z79.4 TYPE 2 DIABETES MELLITUS WITH HYPERGLYCEMIA, WITH LONG-TERM CURRENT USE OF INSULIN: ICD-10-CM

## 2023-12-21 DIAGNOSIS — E78.2 MIXED DIABETIC HYPERLIPIDEMIA ASSOCIATED WITH TYPE 2 DIABETES MELLITUS: ICD-10-CM

## 2023-12-21 DIAGNOSIS — E78.2 MIXED HYPERLIPIDEMIA: ICD-10-CM

## 2023-12-21 RX ORDER — METOPROLOL SUCCINATE 25 MG/1
25 TABLET, EXTENDED RELEASE ORAL DAILY
Qty: 90 TABLET | Refills: 3 | Status: SHIPPED | OUTPATIENT
Start: 2023-12-21 | End: 2024-12-20

## 2023-12-21 RX ORDER — ATORVASTATIN CALCIUM 40 MG/1
40 TABLET, FILM COATED ORAL DAILY
Qty: 90 TABLET | Refills: 3 | Status: SHIPPED | OUTPATIENT
Start: 2023-12-21 | End: 2024-12-20

## 2023-12-21 RX ORDER — ASPIRIN 81 MG/1
81 TABLET ORAL DAILY
Qty: 90 TABLET | Refills: 3 | Status: SHIPPED | OUTPATIENT
Start: 2023-12-21 | End: 2024-12-20

## 2023-12-21 RX ORDER — SACUBITRIL AND VALSARTAN 24; 26 MG/1; MG/1
1 TABLET, FILM COATED ORAL 2 TIMES DAILY
Qty: 180 TABLET | Refills: 3 | Status: SHIPPED | OUTPATIENT
Start: 2023-12-21

## 2023-12-26 DIAGNOSIS — E11.00 TYPE 2 DIABETES MELLITUS WITH HYPEROSMOLARITY WITHOUT COMA, WITHOUT LONG-TERM CURRENT USE OF INSULIN: Primary | ICD-10-CM

## 2023-12-29 ENCOUNTER — LAB VISIT (OUTPATIENT)
Dept: LAB | Facility: HOSPITAL | Age: 58
End: 2023-12-29
Attending: INTERNAL MEDICINE
Payer: COMMERCIAL

## 2023-12-29 DIAGNOSIS — E55.9 VITAMIN D DEFICIENCY: ICD-10-CM

## 2023-12-29 DIAGNOSIS — R79.89 LOW TESTOSTERONE: ICD-10-CM

## 2023-12-29 LAB
DEPRECATED CALCIDIOL+CALCIFEROL SERPL-MC: 23.8 NG/ML (ref 30–80)
FSH SERPL-ACNC: 10.36 MIU/ML
LH SERPL-ACNC: 3.7 MIU/ML

## 2023-12-29 PROCEDURE — 83001 ASSAY OF GONADOTROPIN (FSH): CPT

## 2023-12-29 PROCEDURE — 82306 VITAMIN D 25 HYDROXY: CPT

## 2023-12-29 PROCEDURE — 84403 ASSAY OF TOTAL TESTOSTERONE: CPT

## 2023-12-29 PROCEDURE — 83002 ASSAY OF GONADOTROPIN (LH): CPT

## 2023-12-29 PROCEDURE — 36415 COLL VENOUS BLD VENIPUNCTURE: CPT

## 2024-01-02 NOTE — PROGRESS NOTES
[facility]  [address] [facility] [address]      PATIENT NAME: Chadwick Lorenzo  : 1965  DATE: 24  MRN: [id number]      Billing Provider: Clyde Hyatt MD  Level of Service:   Patient PCP Information       Provider PCP Type    Clyde Hyatt MD General            Reason for Visit / Chief Complaint: Follow-up (No changes since last visit)       History of Present Illness / Problem Focused Workflow     Chadwick Lorenzo presents to the clinic with Follow-up (No changes since last visit)       Patient was last seen by me on 10/11/2023.  He has a history of heart failure, diabetes, kidney stones, low testosterone, migraines morbid obesity.    Today, he stated that he felt a bit dizzy yesterday. However doing better. This morning, his sugar was in the 90s. Otherwise doing well and no complaints.    PMH: HFrEF/NICMO with recovered EF 60% per echo in 2023, DM, kidney stones, low testosterone, migraines, morbid obesity, vit d def, hld     Past Surgical History:   has a past surgical history that includes right rotator cuff surgery and kidney stones removal.      Family History:  family history includes Cancer in his mother; Heart disease in his father.   Mother has colon cancer - diagnosed 48.  Maternal grandmother - cancer (unknown)  Father - CAD     Social History:   reports that he has never smoked. He has never used smokeless tobacco. He reports previous alcohol use. He reports that he does not use drugs.     Review of Systems     10-point ROS performed and negative except as above.      Medical / Social / Family History     Past Medical History:   Diagnosis Date    CHF (congestive heart failure)     Diabetes mellitus, type 2     Hyperlipidemia     IGT (impaired glucose tolerance)     Kidney stones     Low testosterone in male     Migraine     Morbid obesity     Vitamin D deficiency        Past Surgical History:   Procedure  "Laterality Date    ANGIOGRAM, CORONARY, WITH LEFT HEART CATHETERIZATION N/A 08/01/2022    Procedure: Angiogram, Coronary, with Left Heart Cath;  Surgeon: Lev Manuel MD;  Location: [facility] CATH LAB;  Service: Cardiology;  Laterality: N/A;    kidney stones removal      rotator cuff surgery         Social History    reports that he has never smoked. He has never used smokeless tobacco. He reports that he does not drink alcohol and does not use drugs.    Family History  's family history includes Cancer in his mother; Heart disease in his father; No Known Problems in his brother and sister.    Medications and Allergies     Medications  Outpatient Medications Marked as Taking for the 1/4/24 encounter (Office Visit) with Clyde Hyatt MD   Medication Sig Dispense Refill    albuterol (PROVENTIL/VENTOLIN HFA) 90 mcg/actuation inhaler Inhale 2 puffs into the lungs daily as needed for Shortness of Breath. 18 g 0    aspirin (ECOTRIN) 81 MG EC tablet Take 1 tablet (81 mg total) by mouth once daily. 90 tablet 3    atorvastatin (LIPITOR) 40 MG tablet Take 1 tablet (40 mg total) by mouth once daily. 90 tablet 3    BD ULTRA-FINE MINI PEN NEEDLE 31 gauge x 3/16" Ndle SMARTSIG:Pre-Filled Pen Syringe SUB-Q As Directed      blood-glucose meter Misc 1 Device by Misc.(Non-Drug; Combo Route) route 3 (three) times daily before meals. (Patient taking differently: 1 Device by Misc.(Non-Drug; Combo Route) route 2 (two) times a day.) 1 each 0    empagliflozin (JARDIANCE) 10 mg tablet Take 1 tablet (10 mg total) by mouth once daily. 90 tablet 3    ergocalciferol, vitamin D2, (VITAMIN D ORAL) Take 2,000 Units by mouth.      lancets Misc 1 lancet by Misc.(Non-Drug; Combo Route) route 3 (three) times daily before meals. 100 each 11    lancing device Misc 1 Device by Misc.(Non-Drug; Combo Route) route 3 (three) times daily before meals. 1 each 0    meclizine (ANTIVERT) 25 mg tablet Take 1 tablet (25 mg total) by mouth 3 (three) " times daily as needed for Dizziness. 14 tablet 0    metFORMIN (GLUCOPHAGE) 500 MG tablet Take 1 tablet (500 mg total) by mouth 2 (two) times daily with meals. 60 tablet 3    metoprolol succinate (TOPROL-XL) 25 MG 24 hr tablet Take 1 tablet (25 mg total) by mouth once daily. 90 tablet 3    NURTEC 75 mg odt Take 1 tablet (75 mg total) by mouth daily as needed for Migraine. 8 tablet 1    ondansetron (ZOFRAN-ODT) 4 MG TbDL Take 1 tablet (4 mg total) by mouth every 6 (six) hours as needed (nausea/vomiting). 14 tablet 0    sacubitriL-valsartan (ENTRESTO) 24-26 mg per tablet Take 1 tablet by mouth 2 (two) times daily. 180 tablet 3    tirzepatide 10 mg/0.5 mL PnIj Inject 10 mg into the skin every 7 days. for 4 doses 4 pen 0     Current Facility-Administered Medications for the 1/4/24 encounter (Office Visit) with Clyde Hyatt MD   Medication Dose Route Frequency Provider Last Rate Last Admin    sodium chloride 0.9% flush 10 mL  10 mL Intravenous PRN Lev Manuel MD           Allergies  Review of patient's allergies indicates:  No Known Allergies    Physical Examination     Vitals:    01/04/24 1047   BP: 103/65   Pulse: 70   Resp: 18   Temp: 97.5 °F (36.4 °C)     General: AAOx3, NAD, alert and cooperative  HEENT: EOMI, normal conjunctiva  Neck: no LAD, no JVD, supple, no masses or thyromegaly  CVS: S1/S2 nml, RRR, no murmurs, rubs or gallops,  Resp: CTA B/L, no rhonchi, rales, or wheezing  GI: no TTP, not distended, BS+ obese  Skin: not jaundiced, warm, no rashes  Musculoskeletal: normal ROM, no joint tenderness, normal muscular development  Extremities: no peripheral edema, peripheral pulses intact  Neuro: CN II-XII grossly intact, strength and sensation symmetric and intact throughout, no focal neurological deficits        Results   Reviewed recent labs.        Assessment and Plan (including Health Maintenance)     Plan:   Chadwick was seen today for follow-up.    Diagnoses and all orders for this  visit:    Chronic systolic congestive heart failure        NICMO/HFeEF recovered NYHA Class 1  Follows with Cards  Echo on 1/13/23 showed EF of 60%.   Will re-order echo since it will be 1 year.  Continue with entresto 24/26 mg bid, metoprolol 25 mg qd, jardiance  Not on lasix. Does not appear fluid overloaded. euvolemic     CAD  LHC on 8/1/22. Showed moderate CAD  Continue Aspirin, atorvastatin, metoprolol   On entresto    HLD  LDL was 67 on 10/9/23  HDL 37  Continue with atorvastatin  Recommend omega fatty acid (fish oil)     Diabetes Mellitus  A1c 6.2 on 10/9. POC today 5.8  Continue metformin 500 mg bid, jardiance  Also on mounjaro  On an ARB (entresto)  Will order urine protein/cr  Foot exam today done in 10/2023  Follows with eye doctor.      ED  On sildenafil     Vit D Def  Vit D level is 23.8  Recommend 800 IU of Vit D daily  Will recheck     Testosterone Def  LH/FSH wnl  Will order testosterone levels     Obesity  BMI 39.53  Since starting mounjaro, has lost 14 pounds.  States clothes feeling loser.  Continue to exercise and diet.      Immunizations and Health maintenance  Will get flu shot provided by Gwyn bustamante  Pneumovax on 10/23  No need for AAA screening (never smoker)  No need for CT low dose (never smoker)  PSA screen done - wnl  Colonoscopy - Colonsocpy done this year at [facility]. Will try to get records.  Recommend exercise - start with 20 minute walks daily.   refusing shingles vaccine.     F/u in 6 months.    Health Maintenance Due   Topic Date Due    Shingles Vaccine (1 of 2) Never done    Eye Exam  07/28/2023    COVID-19 Vaccine (4 - 2023-24 season) 09/01/2023    Diabetes Urine Screening  09/20/2023    Hemoglobin A1c  01/09/2024         Health Maintenance Topics with due status: Not Due       Topic Last Completion Date    TETANUS VACCINE 06/05/2020    Colorectal Cancer Screening 02/27/2023    Lipid Panel 10/09/2023    Foot Exam 10/11/2023    High Dose Statin 12/21/2023    Aspirin/Antiplatelet  Therapy 12/21/2023       No follow-ups on file.     Future Appointments   Date Time Provider Department Center   1/4/2024 12:30 PM Clyde Hyatt MD [facility]   4/12/2024  9:15 AM Paulina Banuelos FNP [facility]            Signature:  Clyde Hyatt MD  [facility]  [address] [facility] [address]    Date of encounter: 1/4/24

## 2024-01-04 ENCOUNTER — OFFICE VISIT (OUTPATIENT)
Dept: INTERNAL MEDICINE | Facility: CLINIC | Age: 59
End: 2024-01-04
Payer: COMMERCIAL

## 2024-01-04 VITALS
RESPIRATION RATE: 18 BRPM | HEART RATE: 70 BPM | SYSTOLIC BLOOD PRESSURE: 103 MMHG | DIASTOLIC BLOOD PRESSURE: 65 MMHG | WEIGHT: 260 LBS | BODY MASS INDEX: 39.4 KG/M2 | HEIGHT: 68 IN | TEMPERATURE: 98 F | OXYGEN SATURATION: 100 %

## 2024-01-04 DIAGNOSIS — E08.00 DIABETES MELLITUS DUE TO UNDERLYING CONDITION WITH HYPEROSMOLARITY WITHOUT COMA, WITHOUT LONG-TERM CURRENT USE OF INSULIN: ICD-10-CM

## 2024-01-04 DIAGNOSIS — I50.22 CHRONIC SYSTOLIC CONGESTIVE HEART FAILURE: Primary | ICD-10-CM

## 2024-01-04 DIAGNOSIS — R79.89 LOW TESTOSTERONE: ICD-10-CM

## 2024-01-04 DIAGNOSIS — E55.9 VITAMIN D DEFICIENCY: ICD-10-CM

## 2024-01-04 LAB — HBA1C MFR BLD: 5.8 %

## 2024-01-04 PROCEDURE — 99214 OFFICE O/P EST MOD 30 MIN: CPT | Mod: PBBFAC | Performed by: INTERNAL MEDICINE

## 2024-01-04 PROCEDURE — 83036 HEMOGLOBIN GLYCOSYLATED A1C: CPT | Mod: PBBFAC | Performed by: INTERNAL MEDICINE

## 2024-01-05 ENCOUNTER — LAB VISIT (OUTPATIENT)
Dept: LAB | Facility: HOSPITAL | Age: 59
End: 2024-01-05
Attending: INTERNAL MEDICINE
Payer: COMMERCIAL

## 2024-01-05 DIAGNOSIS — R79.89 LOW TESTOSTERONE: ICD-10-CM

## 2024-01-05 DIAGNOSIS — E55.9 VITAMIN D DEFICIENCY: ICD-10-CM

## 2024-01-05 DIAGNOSIS — E08.00 DIABETES MELLITUS DUE TO UNDERLYING CONDITION WITH HYPEROSMOLARITY WITHOUT COMA, WITHOUT LONG-TERM CURRENT USE OF INSULIN: ICD-10-CM

## 2024-01-05 DIAGNOSIS — I50.22 CHRONIC SYSTOLIC CONGESTIVE HEART FAILURE: ICD-10-CM

## 2024-01-05 LAB
ALBUMIN SERPL-MCNC: 4.1 G/DL (ref 3.5–5)
ALBUMIN/GLOB SERPL: 1.2 RATIO (ref 1.1–2)
ALP SERPL-CCNC: 93 UNIT/L (ref 40–150)
ALT SERPL-CCNC: 18 UNIT/L (ref 0–55)
AST SERPL-CCNC: 17 UNIT/L (ref 5–34)
BASOPHILS # BLD AUTO: 0.03 X10(3)/MCL
BASOPHILS NFR BLD AUTO: 0.5 %
BILIRUB SERPL-MCNC: 0.8 MG/DL
BUN SERPL-MCNC: 10.5 MG/DL (ref 8.4–25.7)
CALCIUM SERPL-MCNC: 9.8 MG/DL (ref 8.4–10.2)
CHLORIDE SERPL-SCNC: 110 MMOL/L (ref 98–107)
CO2 SERPL-SCNC: 25 MMOL/L (ref 22–29)
CREAT SERPL-MCNC: 0.9 MG/DL (ref 0.73–1.18)
DEPRECATED CALCIDIOL+CALCIFEROL SERPL-MC: 25.7 NG/ML (ref 30–80)
EOSINOPHIL # BLD AUTO: 0.24 X10(3)/MCL (ref 0–0.9)
EOSINOPHIL NFR BLD AUTO: 3.8 %
ERYTHROCYTE [DISTWIDTH] IN BLOOD BY AUTOMATED COUNT: 13.2 % (ref 11.5–17)
GFR SERPLBLD CREATININE-BSD FMLA CKD-EPI: >60 MLS/MIN/1.73/M2
GLOBULIN SER-MCNC: 3.4 GM/DL (ref 2.4–3.5)
GLUCOSE SERPL-MCNC: 85 MG/DL (ref 74–100)
HCT VFR BLD AUTO: 43 % (ref 42–52)
HGB BLD-MCNC: 14 G/DL (ref 14–18)
IMM GRANULOCYTES # BLD AUTO: 0.01 X10(3)/MCL (ref 0–0.04)
IMM GRANULOCYTES NFR BLD AUTO: 0.2 %
LYMPHOCYTES # BLD AUTO: 3.59 X10(3)/MCL (ref 0.6–4.6)
LYMPHOCYTES NFR BLD AUTO: 56.6 %
MCH RBC QN AUTO: 28.5 PG (ref 27–31)
MCHC RBC AUTO-ENTMCNC: 32.6 G/DL (ref 33–36)
MCV RBC AUTO: 87.6 FL (ref 80–94)
MONOCYTES # BLD AUTO: 0.43 X10(3)/MCL (ref 0.1–1.3)
MONOCYTES NFR BLD AUTO: 6.8 %
NEUTROPHILS # BLD AUTO: 2.04 X10(3)/MCL (ref 2.1–9.2)
NEUTROPHILS NFR BLD AUTO: 32.1 %
NRBC BLD AUTO-RTO: 0 %
PLATELET # BLD AUTO: 341 X10(3)/MCL (ref 130–400)
PMV BLD AUTO: 9.3 FL (ref 7.4–10.4)
POTASSIUM SERPL-SCNC: 4 MMOL/L (ref 3.5–5.1)
PROT SERPL-MCNC: 7.5 GM/DL (ref 6.4–8.3)
RBC # BLD AUTO: 4.91 X10(6)/MCL (ref 4.7–6.1)
SODIUM SERPL-SCNC: 140 MMOL/L (ref 136–145)
VIT B12 SERPL-MCNC: 387 PG/ML (ref 213–816)
WBC # SPEC AUTO: 6.34 X10(3)/MCL (ref 4.5–11.5)

## 2024-01-05 PROCEDURE — 80053 COMPREHEN METABOLIC PANEL: CPT

## 2024-01-05 PROCEDURE — 85025 COMPLETE CBC W/AUTO DIFF WBC: CPT

## 2024-01-05 PROCEDURE — 36415 COLL VENOUS BLD VENIPUNCTURE: CPT

## 2024-01-05 PROCEDURE — 84403 ASSAY OF TOTAL TESTOSTERONE: CPT

## 2024-01-05 PROCEDURE — 82607 VITAMIN B-12: CPT

## 2024-01-05 PROCEDURE — 82306 VITAMIN D 25 HYDROXY: CPT

## 2024-01-09 LAB
TESTOST FREE SERPL-MCNC: 4.03 NG/DL (ref 3.87–14.7)
TESTOST SERPL-MCNC: 158 NG/DL (ref 240–950)

## 2024-01-14 LAB
TESTOST FREE SERPL-MCNC: 5.95 NG/DL (ref 3.87–14.7)
TESTOST SERPL-MCNC: 200 NG/DL (ref 240–950)

## 2024-01-23 DIAGNOSIS — E08.00 DIABETES MELLITUS DUE TO UNDERLYING CONDITION WITH HYPEROSMOLARITY WITHOUT COMA, WITHOUT LONG-TERM CURRENT USE OF INSULIN: ICD-10-CM

## 2024-01-23 DIAGNOSIS — E66.01 CLASS 2 SEVERE OBESITY DUE TO EXCESS CALORIES WITH SERIOUS COMORBIDITY IN ADULT, UNSPECIFIED BMI: Primary | ICD-10-CM

## 2024-01-23 RX ORDER — TIRZEPATIDE 12.5 MG/.5ML
12.5 INJECTION, SOLUTION SUBCUTANEOUS
Qty: 4 PEN | Refills: 0 | Status: SHIPPED | OUTPATIENT
Start: 2024-01-23 | End: 2024-02-14

## 2024-02-19 DIAGNOSIS — E08.00 DIABETES MELLITUS DUE TO UNDERLYING CONDITION WITH HYPEROSMOLARITY WITHOUT COMA, WITHOUT LONG-TERM CURRENT USE OF INSULIN: Primary | ICD-10-CM

## 2024-02-19 RX ORDER — TIRZEPATIDE 15 MG/.5ML
15 INJECTION, SOLUTION SUBCUTANEOUS
Qty: 4 PEN | Refills: 2 | Status: SHIPPED | OUTPATIENT
Start: 2024-02-19 | End: 2024-03-22 | Stop reason: SDUPTHER

## 2024-02-19 RX ORDER — TIRZEPATIDE 15 MG/.5ML
15 INJECTION, SOLUTION SUBCUTANEOUS
Qty: 4 PEN | Refills: 2 | Status: SHIPPED | OUTPATIENT
Start: 2024-02-19 | End: 2024-02-19 | Stop reason: SDUPTHER

## 2024-03-01 DIAGNOSIS — E11.59 HYPERTENSION ASSOCIATED WITH TYPE 2 DIABETES MELLITUS: Chronic | ICD-10-CM

## 2024-03-01 DIAGNOSIS — I15.2 HYPERTENSION ASSOCIATED WITH TYPE 2 DIABETES MELLITUS: Chronic | ICD-10-CM

## 2024-03-01 DIAGNOSIS — E11.69 MIXED DIABETIC HYPERLIPIDEMIA ASSOCIATED WITH TYPE 2 DIABETES MELLITUS: ICD-10-CM

## 2024-03-01 DIAGNOSIS — E78.2 MIXED DIABETIC HYPERLIPIDEMIA ASSOCIATED WITH TYPE 2 DIABETES MELLITUS: ICD-10-CM

## 2024-03-01 DIAGNOSIS — E11.65 TYPE 2 DIABETES MELLITUS WITH HYPERGLYCEMIA, WITH LONG-TERM CURRENT USE OF INSULIN: Primary | ICD-10-CM

## 2024-03-01 DIAGNOSIS — Z79.4 TYPE 2 DIABETES MELLITUS WITH HYPERGLYCEMIA, WITH LONG-TERM CURRENT USE OF INSULIN: Primary | ICD-10-CM

## 2024-03-01 RX ORDER — METFORMIN HYDROCHLORIDE 500 MG/1
500 TABLET ORAL 2 TIMES DAILY WITH MEALS
Qty: 60 TABLET | Refills: 3 | Status: SHIPPED | OUTPATIENT
Start: 2024-03-01

## 2024-03-22 DIAGNOSIS — E08.00 DIABETES MELLITUS DUE TO UNDERLYING CONDITION WITH HYPEROSMOLARITY WITHOUT COMA, WITHOUT LONG-TERM CURRENT USE OF INSULIN: ICD-10-CM

## 2024-03-22 RX ORDER — TIRZEPATIDE 15 MG/.5ML
15 INJECTION, SOLUTION SUBCUTANEOUS
Qty: 4 PEN | Refills: 2 | Status: SHIPPED | OUTPATIENT
Start: 2024-03-22 | End: 2024-04-23 | Stop reason: CLARIF

## 2024-04-08 DIAGNOSIS — I42.8 NONISCHEMIC CARDIOMYOPATHY: Primary | ICD-10-CM

## 2024-04-08 DIAGNOSIS — I25.10 CORONARY ARTERY DISEASE INVOLVING NATIVE CORONARY ARTERY OF NATIVE HEART WITHOUT ANGINA PECTORIS: ICD-10-CM

## 2024-04-08 RX ORDER — SACUBITRIL AND VALSARTAN 24; 26 MG/1; MG/1
1 TABLET, FILM COATED ORAL 2 TIMES DAILY
Qty: 180 TABLET | Refills: 3 | Status: SHIPPED | OUTPATIENT
Start: 2024-04-08

## 2024-04-08 RX ORDER — ASPIRIN 81 MG/1
81 TABLET ORAL DAILY
Qty: 90 TABLET | Refills: 3 | Status: SHIPPED | OUTPATIENT
Start: 2024-04-08 | End: 2025-04-08

## 2024-04-08 RX ORDER — METOPROLOL SUCCINATE 25 MG/1
25 TABLET, EXTENDED RELEASE ORAL DAILY
Qty: 90 TABLET | Refills: 3 | Status: SHIPPED | OUTPATIENT
Start: 2024-04-08 | End: 2025-04-08

## 2024-04-23 DIAGNOSIS — E11.00 TYPE 2 DIABETES MELLITUS WITH HYPEROSMOLARITY WITHOUT COMA, WITHOUT LONG-TERM CURRENT USE OF INSULIN: Primary | ICD-10-CM

## 2024-04-23 RX ORDER — SEMAGLUTIDE 0.68 MG/ML
0.5 INJECTION, SOLUTION SUBCUTANEOUS
Qty: 3 ML | Refills: 0 | Status: SHIPPED | OUTPATIENT
Start: 2024-04-23 | End: 2024-05-15

## 2024-05-14 DIAGNOSIS — E11.00 TYPE 2 DIABETES MELLITUS WITH HYPEROSMOLARITY WITHOUT COMA, WITHOUT LONG-TERM CURRENT USE OF INSULIN: Primary | ICD-10-CM

## 2024-05-15 DIAGNOSIS — E08.00 DIABETES MELLITUS DUE TO UNDERLYING CONDITION WITH HYPEROSMOLARITY WITHOUT COMA, WITHOUT LONG-TERM CURRENT USE OF INSULIN: Primary | ICD-10-CM

## 2024-05-15 RX ORDER — LIRAGLUTIDE 6 MG/ML
INJECTION SUBCUTANEOUS
Qty: 107.4 ML | Refills: 0 | Status: SHIPPED | OUTPATIENT
Start: 2024-05-15 | End: 2025-05-15

## 2024-05-15 RX ORDER — PEN NEEDLE, DIABETIC 33 GX5/32"
365 NEEDLE, DISPOSABLE MISCELLANEOUS DAILY
Qty: 365 EACH | Refills: 0 | Status: SHIPPED | OUTPATIENT
Start: 2024-05-15

## 2024-05-29 ENCOUNTER — OFFICE VISIT (OUTPATIENT)
Dept: URGENT CARE | Facility: CLINIC | Age: 59
End: 2024-05-29
Payer: COMMERCIAL

## 2024-05-29 VITALS
OXYGEN SATURATION: 98 % | BODY MASS INDEX: 38.8 KG/M2 | RESPIRATION RATE: 16 BRPM | TEMPERATURE: 98 F | SYSTOLIC BLOOD PRESSURE: 120 MMHG | WEIGHT: 256 LBS | HEART RATE: 71 BPM | DIASTOLIC BLOOD PRESSURE: 81 MMHG | HEIGHT: 68 IN

## 2024-05-29 DIAGNOSIS — J02.9 SORE THROAT: ICD-10-CM

## 2024-05-29 DIAGNOSIS — J02.9 ACUTE VIRAL PHARYNGITIS: Primary | ICD-10-CM

## 2024-05-29 DIAGNOSIS — M43.6 NS (NECK STIFFNESS): ICD-10-CM

## 2024-05-29 LAB
CTP QC/QA: YES
GLUCOSE SERPL-MCNC: 69 MG/DL (ref 70–110)
MOLECULAR STREP A: NEGATIVE

## 2024-05-29 PROCEDURE — 82962 GLUCOSE BLOOD TEST: CPT | Mod: PBBFAC

## 2024-05-29 PROCEDURE — 63600175 PHARM REV CODE 636 W HCPCS

## 2024-05-29 PROCEDURE — 99213 OFFICE O/P EST LOW 20 MIN: CPT | Mod: S$PBB,,,

## 2024-05-29 PROCEDURE — 87651 STREP A DNA AMP PROBE: CPT | Mod: PBBFAC

## 2024-05-29 PROCEDURE — 99214 OFFICE O/P EST MOD 30 MIN: CPT | Mod: PBBFAC

## 2024-05-29 RX ORDER — PEN NEEDLE, DIABETIC 32GX 5/32"
NEEDLE, DISPOSABLE MISCELLANEOUS
COMMUNITY
Start: 2024-05-15

## 2024-05-29 RX ORDER — TIRZEPATIDE 15 MG/.5ML
INJECTION, SOLUTION SUBCUTANEOUS
COMMUNITY
Start: 2024-05-24

## 2024-05-29 RX ORDER — BACLOFEN 10 MG/1
10 TABLET ORAL 3 TIMES DAILY PRN
Qty: 9 TABLET | Refills: 0 | Status: SHIPPED | OUTPATIENT
Start: 2024-05-29 | End: 2024-06-01

## 2024-05-29 RX ORDER — DEXAMETHASONE SODIUM PHOSPHATE 100 MG/10ML
10 INJECTION INTRAMUSCULAR; INTRAVENOUS
Status: COMPLETED | OUTPATIENT
Start: 2024-05-29 | End: 2024-05-29

## 2024-05-29 RX ADMIN — DEXAMETHASONE SODIUM PHOSPHATE 10 MG: 10 INJECTION INTRAMUSCULAR; INTRAVENOUS at 10:05

## 2024-05-29 NOTE — PROGRESS NOTES
"Subjective:       Patient ID: Chadwick Lorenzo is a 58 y.o. male.    Vitals:  height is 5' 8" (1.727 m) and weight is 116.1 kg (256 lb). His oral temperature is 97.7 °F (36.5 °C). His blood pressure is 120/81 and his pulse is 71. His respiration is 16 and oxygen saturation is 98%.     Chief Complaint: Sore Throat (Patient reports having a sore throat since last night, patient also reports neck pain and post nasal drip)    50-year-old  male presents to the clinic complaining throat symptoms onset yesterday, states exposed to rainy weather, reports symptoms are interfering with work duties.      Sore Throat   Associated symptoms include vomiting. Pertinent negatives include no abdominal pain, coughing, diarrhea, ear pain, headaches, shortness of breath or trouble swallowing.       Constitution: Positive for sweating. Negative for fever.   HENT:  Positive for postnasal drip and sore throat. Negative for ear pain, trouble swallowing and voice change.    Neck: Positive for neck stiffness. Negative for neck swelling.   Cardiovascular:  Negative for chest pain and palpitations.   Respiratory:  Negative for cough and shortness of breath.    Gastrointestinal:  Positive for vomiting. Negative for abdominal pain and diarrhea.   Musculoskeletal:  Positive for muscle ache. Negative for trauma.   Neurological:  Negative for dizziness and headaches.       Objective:      Physical Exam   Constitutional: He is oriented to person, place, and time. He is cooperative. He is easily aroused. He appears ill. overweightawake  HENT:   Head: Normocephalic and atraumatic.   Ears:   Right Ear: Tympanic membrane normal.   Left Ear: Tympanic membrane normal.   Nose: Nose normal.   Mouth/Throat: Uvula is midline and mucous membranes are normal. Posterior oropharyngeal erythema present.   Eyes: Conjunctivae and lids are normal. Extraocular movement intact   Neck: Neck supple. There are no signs of injury. Torticollis present. pain " with movement present. muscular tenderness present.   Cardiovascular: Normal rate, regular rhythm and S2 normal.   Pulmonary/Chest: Effort normal and breath sounds normal.   Musculoskeletal:      Right lower leg: No edema.      Left lower leg: No edema.   Lymphadenopathy:     He has no cervical adenopathy.   Neurological: He is alert, oriented to person, place, and time and easily aroused. Gait normal. GCS eye subscore is 4. GCS verbal subscore is 5. GCS motor subscore is 6.   Skin: Skin is warm, dry and intact. Capillary refill takes less than 2 seconds.   Psychiatric: His speech is normal and behavior is normal.   Nursing note and vitals reviewed.        Assessment:       1. Acute viral pharyngitis    2. Sore throat    3. NS (neck stiffness)          Plan:     Strep test is negative today in clinic, encouraged patient to remain hydrated, Tylenol/Motrin as needed for pain.  May benefit from antihistamine and Flonase, saltwater rinses, neck exercises.  We will prescribe short course of muscle relaxers for neck stiffness.      Blood sugar not elevated today in clinic, offered patient steroid injection.  Keep your scheduled appointment with PCP, ED precautions discussed.  Acute viral pharyngitis  -     POCT Glucose, Hand-Held Device    Sore throat  -     POCT Strep A, Molecular  -     dexAMETHasone injection 10 mg    NS (neck stiffness)  -     baclofen (LIORESAL) 10 MG tablet; Take 1 tablet (10 mg total) by mouth 3 (three) times daily as needed (neck stiffness).  Dispense: 9 tablet; Refill: 0           Results for orders placed or performed in visit on 05/29/24   POCT Strep A, Molecular   Result Value Ref Range    Molecular Strep A, POC Negative Negative     Acceptable Yes    POCT Glucose, Hand-Held Device   Result Value Ref Range    POC Glucose 69 (A) 70 - 110 MG/DL

## 2024-05-29 NOTE — LETTER
May 29, 2024      [facility]  [facility] [facility] [address]  Phone: [phone]       Patient: Chadwick Lorenzo   YOB: 1965  Date of Visit: 05/29/2024    To Whom It May Concern:    Luzmaria Lorenzo  was at [facility] on 05/29/2024. The patient may return to work/school on 05/30/2024 with no restrictions. If you have any questions or concerns, or if I can be of further assistance, please do not hesitate to contact me.    Sincerely,    BHARTI Mcgee

## 2024-05-29 NOTE — NURSING
Dexamethasone 10 mg administered to Right Gluteal using aseptic technique. Patient observed for 15 minutes for reactions, none noted. Patient tolerated well.

## 2024-07-10 ENCOUNTER — LAB VISIT (OUTPATIENT)
Dept: LAB | Facility: HOSPITAL | Age: 59
End: 2024-07-10
Attending: INTERNAL MEDICINE
Payer: COMMERCIAL

## 2024-07-10 DIAGNOSIS — I50.22 CHRONIC SYSTOLIC CONGESTIVE HEART FAILURE: ICD-10-CM

## 2024-07-10 DIAGNOSIS — E08.00 DIABETES MELLITUS DUE TO UNDERLYING CONDITION WITH HYPEROSMOLARITY WITHOUT COMA, WITHOUT LONG-TERM CURRENT USE OF INSULIN: ICD-10-CM

## 2024-07-10 DIAGNOSIS — E55.9 VITAMIN D DEFICIENCY: ICD-10-CM

## 2024-07-10 DIAGNOSIS — R79.89 LOW TESTOSTERONE: ICD-10-CM

## 2024-07-10 LAB
25(OH)D3+25(OH)D2 SERPL-MCNC: 21 NG/ML (ref 30–80)
ALBUMIN SERPL-MCNC: 4.1 G/DL (ref 3.5–5)
ALBUMIN/GLOB SERPL: 1.2 RATIO (ref 1.1–2)
ALP SERPL-CCNC: 94 UNIT/L (ref 40–150)
ALT SERPL-CCNC: 16 UNIT/L (ref 0–55)
ANION GAP SERPL CALC-SCNC: 6 MEQ/L
AST SERPL-CCNC: 17 UNIT/L (ref 5–34)
BACTERIA #/AREA URNS AUTO: ABNORMAL /HPF
BASOPHILS # BLD AUTO: 0.04 X10(3)/MCL
BASOPHILS NFR BLD AUTO: 0.7 %
BILIRUB SERPL-MCNC: 0.5 MG/DL
BILIRUB UR QL STRIP.AUTO: NEGATIVE
BUN SERPL-MCNC: 9.9 MG/DL (ref 8.4–25.7)
CALCIUM SERPL-MCNC: 10.9 MG/DL (ref 8.4–10.2)
CHLORIDE SERPL-SCNC: 108 MMOL/L (ref 98–107)
CHOLEST SERPL-MCNC: 123 MG/DL
CHOLEST/HDLC SERPL: 3 {RATIO} (ref 0–5)
CLARITY UR: CLEAR
CO2 SERPL-SCNC: 25 MMOL/L (ref 22–29)
COLOR UR AUTO: ABNORMAL
CREAT SERPL-MCNC: 0.84 MG/DL (ref 0.73–1.18)
CREAT/UREA NIT SERPL: 12
EOSINOPHIL # BLD AUTO: 0.24 X10(3)/MCL (ref 0–0.9)
EOSINOPHIL NFR BLD AUTO: 4.1 %
ERYTHROCYTE [DISTWIDTH] IN BLOOD BY AUTOMATED COUNT: 13.3 % (ref 11.5–17)
EST. AVERAGE GLUCOSE BLD GHB EST-MCNC: 116.9 MG/DL
GFR SERPLBLD CREATININE-BSD FMLA CKD-EPI: >60 ML/MIN/1.73/M2
GLOBULIN SER-MCNC: 3.5 GM/DL (ref 2.4–3.5)
GLUCOSE SERPL-MCNC: 93 MG/DL (ref 74–100)
GLUCOSE UR QL STRIP: ABNORMAL
HBA1C MFR BLD: 5.7 %
HCT VFR BLD AUTO: 45 % (ref 42–52)
HDLC SERPL-MCNC: 38 MG/DL (ref 35–60)
HGB BLD-MCNC: 14.4 G/DL (ref 14–18)
HGB UR QL STRIP: NEGATIVE
HYALINE CASTS #/AREA URNS LPF: ABNORMAL /LPF
IMM GRANULOCYTES # BLD AUTO: 0.01 X10(3)/MCL (ref 0–0.04)
IMM GRANULOCYTES NFR BLD AUTO: 0.2 %
KETONES UR QL STRIP: NEGATIVE
LDLC SERPL CALC-MCNC: 68 MG/DL (ref 50–140)
LEUKOCYTE ESTERASE UR QL STRIP: NEGATIVE
LYMPHOCYTES # BLD AUTO: 3.07 X10(3)/MCL (ref 0.6–4.6)
LYMPHOCYTES NFR BLD AUTO: 52.1 %
MCH RBC QN AUTO: 28 PG (ref 27–31)
MCHC RBC AUTO-ENTMCNC: 32 G/DL (ref 33–36)
MCV RBC AUTO: 87.4 FL (ref 80–94)
MONOCYTES # BLD AUTO: 0.33 X10(3)/MCL (ref 0.1–1.3)
MONOCYTES NFR BLD AUTO: 5.6 %
MUCOUS THREADS URNS QL MICRO: ABNORMAL /LPF
NEUTROPHILS # BLD AUTO: 2.2 X10(3)/MCL (ref 2.1–9.2)
NEUTROPHILS NFR BLD AUTO: 37.3 %
NITRITE UR QL STRIP: NEGATIVE
NRBC BLD AUTO-RTO: 0 %
PH UR STRIP: 5.5 [PH]
PLATELET # BLD AUTO: 326 X10(3)/MCL (ref 130–400)
PMV BLD AUTO: 9.4 FL (ref 7.4–10.4)
POTASSIUM SERPL-SCNC: 4.2 MMOL/L (ref 3.5–5.1)
PROT SERPL-MCNC: 7.6 GM/DL (ref 6.4–8.3)
PROT UR QL STRIP: NEGATIVE
RBC # BLD AUTO: 5.15 X10(6)/MCL (ref 4.7–6.1)
RBC #/AREA URNS AUTO: ABNORMAL /HPF
SODIUM SERPL-SCNC: 139 MMOL/L (ref 136–145)
SP GR UR STRIP.AUTO: 1.03 (ref 1–1.03)
SQUAMOUS #/AREA URNS LPF: ABNORMAL /HPF
TRIGL SERPL-MCNC: 83 MG/DL (ref 34–140)
TSH SERPL-ACNC: 2.03 UIU/ML (ref 0.35–4.94)
UROBILINOGEN UR STRIP-ACNC: NORMAL
VLDLC SERPL CALC-MCNC: 17 MG/DL
WBC # BLD AUTO: 5.89 X10(3)/MCL (ref 4.5–11.5)
WBC #/AREA URNS AUTO: ABNORMAL /HPF

## 2024-07-10 PROCEDURE — 80061 LIPID PANEL: CPT

## 2024-07-10 PROCEDURE — 83036 HEMOGLOBIN GLYCOSYLATED A1C: CPT

## 2024-07-10 PROCEDURE — 84402 ASSAY OF FREE TESTOSTERONE: CPT

## 2024-07-10 PROCEDURE — 84443 ASSAY THYROID STIM HORMONE: CPT

## 2024-07-10 PROCEDURE — 82306 VITAMIN D 25 HYDROXY: CPT

## 2024-07-10 PROCEDURE — 80053 COMPREHEN METABOLIC PANEL: CPT

## 2024-07-10 PROCEDURE — 85025 COMPLETE CBC W/AUTO DIFF WBC: CPT

## 2024-07-10 PROCEDURE — 81001 URINALYSIS AUTO W/SCOPE: CPT

## 2024-07-10 PROCEDURE — 36415 COLL VENOUS BLD VENIPUNCTURE: CPT

## 2024-07-11 ENCOUNTER — OFFICE VISIT (OUTPATIENT)
Dept: INTERNAL MEDICINE | Facility: CLINIC | Age: 59
End: 2024-07-11
Payer: COMMERCIAL

## 2024-07-11 VITALS
DIASTOLIC BLOOD PRESSURE: 67 MMHG | TEMPERATURE: 98 F | BODY MASS INDEX: 37.13 KG/M2 | HEIGHT: 68 IN | SYSTOLIC BLOOD PRESSURE: 101 MMHG | WEIGHT: 245 LBS | RESPIRATION RATE: 16 BRPM | HEART RATE: 65 BPM | OXYGEN SATURATION: 100 %

## 2024-07-11 DIAGNOSIS — Z86.79 HISTORY OF HEART FAILURE: Primary | ICD-10-CM

## 2024-07-11 PROCEDURE — 99214 OFFICE O/P EST MOD 30 MIN: CPT | Mod: PBBFAC | Performed by: INTERNAL MEDICINE

## 2024-07-11 NOTE — PROGRESS NOTES
[facility]  [address] [facility] [id number]      PATIENT NAME: Chadwick Lorenzo  : 1965  DATE: 24  MRN: [id number]      Billing Provider: Clyde Hyatt MD  Level of Service:   Patient PCP Information       Provider PCP Type    Clyde Hyatt MD General            Reason for Visit / Chief Complaint: Follow-up (Routine follow up visit. )       History of Present Illness / Problem Focused Workflow     Chadwick Lorenzo presents to the clinic with Follow-up (Routine follow up visit. )     Patient was last seen by me on 2024. He has a history of heart failure, diabetes, kidney stones, low testosterone, migraines morbid obesity.     Has no complaints today.     PMH: HFrEF/NICMO with recovered EF 60% per echo in 2023, DM, kidney stones, low testosterone, migraines, morbid obesity, vit d def, hld       Review of Systems     10-point ROS performed and negative except as above.      Medical / Social / Family History     Past Medical History:   Diagnosis Date    CHF (congestive heart failure)     Diabetes mellitus, type 2     Hyperlipidemia     IGT (impaired glucose tolerance)     Kidney stones     Low testosterone in male     Migraine     Morbid obesity     Vitamin D deficiency        Past Surgical History:   Procedure Laterality Date    ANGIOGRAM, CORONARY, WITH LEFT HEART CATHETERIZATION N/A 2022    Procedure: Angiogram, Coronary, with Left Heart Cath;  Surgeon: Lev Manuel MD;  Location: [facility] CATH LAB;  Service: Cardiology;  Laterality: N/A;    kidney stones removal      rotator cuff surgery         Social History    reports that he has never smoked. He has never used smokeless tobacco. He reports that he does not drink alcohol and does not use drugs.    Family History  's family history includes Cancer in his mother; Heart disease in his father; No Known Problems in his brother and sister.    Medications  "and Allergies     Medications  Outpatient Medications Marked as Taking for the 7/11/24 encounter (Office Visit) with Clyde Hyatt MD   Medication Sig Dispense Refill    albuterol (PROVENTIL/VENTOLIN HFA) 90 mcg/actuation inhaler Inhale 2 puffs into the lungs daily as needed for Shortness of Breath. 18 g 0    aspirin (ECOTRIN) 81 MG EC tablet Take 1 tablet (81 mg total) by mouth once daily. 90 tablet 3    atorvastatin (LIPITOR) 40 MG tablet Take 1 tablet (40 mg total) by mouth once daily. 90 tablet 3    BD ULTRA-FINE MINI PEN NEEDLE 31 gauge x 3/16" Ndle SMARTSIG:Pre-Filled Pen Syringe SUB-Q As Directed      empagliflozin (JARDIANCE) 10 mg tablet Take 1 tablet (10 mg total) by mouth once daily. 90 tablet 3    lancets Misc 1 lancet by Misc.(Non-Drug; Combo Route) route 3 (three) times daily before meals. 100 each 11    lancing device Misc 1 Device by Misc.(Non-Drug; Combo Route) route 3 (three) times daily before meals. 1 each 0    metFORMIN (GLUCOPHAGE) 500 MG tablet Take 1 tablet (500 mg total) by mouth 2 (two) times daily with meals. 60 tablet 3    metoprolol succinate (TOPROL-XL) 25 MG 24 hr tablet Take 1 tablet (25 mg total) by mouth once daily. 90 tablet 3    MOUNJARO 15 mg/0.5 mL PnIj SMARTSIG:15 Milligram(s) SUB-Q Once a Week      NURTEC 75 mg odt Take 1 tablet (75 mg total) by mouth daily as needed for Migraine. 8 tablet 1    pen needle, diabetic 33 gauge x 5/32" Ndle 365 Units by Misc.(Non-Drug; Combo Route) route once daily. 365 each 0    sacubitriL-valsartan (ENTRESTO) 24-26 mg per tablet Take 1 tablet by mouth 2 (two) times daily. 180 tablet 3    TECHLITE PEN NEEDLE 32 gauge x 5/32" Ndle USE 1 DAILY. 100 DAY SUPPLY       Current Facility-Administered Medications for the 7/11/24 encounter (Office Visit) with Clyde Hyatt MD   Medication Dose Route Frequency Provider Last Rate Last Admin    sodium chloride 0.9% flush 10 mL  10 mL Intravenous PRN Edavettal, Gurvinder, MD     "       Allergies  Review of patient's allergies indicates:  No Known Allergies    Physical Examination     Vitals:    07/11/24 1438   BP: 101/67   Pulse: 65   Resp: 16   Temp: 97.8 °F (36.6 °C)     General: AAOx3, NAD, alert and cooperative  HEENT: PERRLA, EOMI, normal conjunctiva  Neck: no LAD, no JVD, supple, no masses or thyromegaly  CVS: S1/S2 nml, RRR, no murmurs, rubs or gallops, no carotid bruits  Resp: CTA B/L, no rhonchi, rales, or wheezing  GI: no TTP, not distended, BS+  : no CVA tenderness  Skin: not jaundiced, warm, no rashes  Musculoskeletal: normal ROM, no joint tenderness, normal muscular development  Extremities: no peripheral edema, peripheral pulses intact  Neuro: CN II-XII grossly intact, strength and sensation symmetric and intact throughout, no focal neurological deficits        Results   Reviewed recent labs.        Assessment and Plan (including Health Maintenance)     Plan:   Chadwick was seen today for follow-up.    Diagnoses and all orders for this visit:    History of heart failure        NICMO/HFeEF recovered NYHA Class 1  Follows with Cards  Echo on 1/13/23 showed EF of 60%.   Will re-order echo since it will be 1 year.  Continue with entresto 24/26 mg bid, metoprolol 25 mg qd, jardiance  Not on lasix. Does not appear fluid overloaded. euvolemic     CAD  LHC on 8/1/22. Showed moderate CAD  Continue Aspirin, atorvastatin, metoprolol   On entresto     HLD  LDL was 67 on 10/9/23  HDL 37  Continue with atorvastatin  Recommend omega fatty acid (fish oil)     Diabetes Mellitus   A1c 5.7  Continue metformin 500 mg bid, jardiance  Also on mounjaro  On an ARB (entresto)  Will order urine protein/cr  Foot exam today done in 10/2023  Follows with eye doctor.      ED  On sildenafil     Vit D Def  Vit D level is 21  Recommend 1000 IU daily     Testosterone Def  LH/FSH wnl  Will order testosterone levels     Obesity  BMI 37.25  Continue mounjaro  States clothes feeling loser.  Continue to exercise  and diet.        Immunizations and Health maintenance  Will get flu shot provided by Gwyn bustamante  Pneumovax on 10/23  No need for AAA screening (never smoker)  No need for CT low dose (never smoker)  PSA screen done - [facility]  Colonoscopy - Colonsocpy done this year at [facility]. Will try to get records.  Recommend exercise - start with 20 minute walks daily.   refusing shingles vaccine.     F/u in 6 months.    Health Maintenance Due   Topic Date Due    Shingles Vaccine (1 of 2) Never done    Eye Exam  07/28/2023    COVID-19 Vaccine (4 - 2023-24 season) 09/01/2023    Diabetes Urine Screening  09/20/2023         Health Maintenance Topics with due status: Not Due       Topic Last Completion Date    TETANUS VACCINE 06/05/2020    Colorectal Cancer Screening 02/27/2023    Foot Exam 10/11/2023    Influenza Vaccine 10/19/2023    High Dose Statin 05/29/2024    Aspirin/Antiplatelet Therapy 05/29/2024    Lipid Panel 07/10/2024    Hemoglobin A1c 07/10/2024       No follow-ups on file.     Future Appointments   Date Time Provider Department Center   7/12/2024  9:45 AM Paulina Banuelos, BHARTI [facility]            Signature:  Clyde Hyatt MD  [facility]  [address] [facility] [id number]    Date of encounter: 7/11/24

## 2024-07-12 ENCOUNTER — OFFICE VISIT (OUTPATIENT)
Dept: CARDIOLOGY | Facility: CLINIC | Age: 59
End: 2024-07-12
Payer: COMMERCIAL

## 2024-07-12 VITALS
BODY MASS INDEX: 36.77 KG/M2 | RESPIRATION RATE: 20 BRPM | WEIGHT: 242.63 LBS | DIASTOLIC BLOOD PRESSURE: 74 MMHG | HEART RATE: 64 BPM | TEMPERATURE: 98 F | OXYGEN SATURATION: 100 % | SYSTOLIC BLOOD PRESSURE: 112 MMHG | HEIGHT: 68 IN

## 2024-07-12 DIAGNOSIS — R42 DIZZINESS: ICD-10-CM

## 2024-07-12 DIAGNOSIS — I10 PRIMARY HYPERTENSION: Chronic | ICD-10-CM

## 2024-07-12 DIAGNOSIS — I42.8 NONISCHEMIC CARDIOMYOPATHY: Primary | ICD-10-CM

## 2024-07-12 DIAGNOSIS — E78.2 MIXED DIABETIC HYPERLIPIDEMIA ASSOCIATED WITH TYPE 2 DIABETES MELLITUS: ICD-10-CM

## 2024-07-12 DIAGNOSIS — E11.69 MIXED DIABETIC HYPERLIPIDEMIA ASSOCIATED WITH TYPE 2 DIABETES MELLITUS: ICD-10-CM

## 2024-07-12 DIAGNOSIS — E78.2 MIXED HYPERLIPIDEMIA: Chronic | ICD-10-CM

## 2024-07-12 PROCEDURE — 99215 OFFICE O/P EST HI 40 MIN: CPT | Mod: PBBFAC | Performed by: NURSE PRACTITIONER

## 2024-07-12 NOTE — PROGRESS NOTES
CHIEF COMPLAINT:   Chief Complaint   Patient presents with    Follow-up     Denies any cardiac problems                     Review of patient's allergies indicates:  No Known Allergies                                       HPI:  Chadwick Lorenzo 58 y.o. male with NICMO with recovered EF 60% per ECHO Jan. 2023, Nonobstructive CAD per [facility] 8.1.22, HLD,  DM II, who presents to cardiology clinic for routine follow-up and ongoing care.  Latest Echocardiogram obtained on 1.13.23 revealed an ejection fraction of 60%, which is recovered from previous EF of 25% per ECHO June 2022.  Ischemic wise, the patient underwent a Left Heart Catheterization on 8.1.22 that revealed moderate CAD, 60% to 1st Mrg.    Patient presents to clinic today reporting that he feels good.  He denies any complaints of chest pain, shortness of breath, palpitations, orthopnea, PND, peripheral edema, or claudication.  The patient does report intermittent episodes of dizziness that occurred approximately 2 months ago but denies experiencing any near-syncope or syncope.  He denies any further episodes.  Patient tries to remain active and is able to perform his routine yard work, which includes mowing his lawn with a self propel push mower and weed eating without any angina or equivalent symptoms.  He also routinely ambulates with his wife, and states that he averages about 8000+ steps a day.  He endorses compliance with his current medications and is currently tolerating without issue.    CARDIAC TESTING:  ECHO 1.13.23  The left ventricle is normal in size with concentric remodeling and normal systolic function.  The estimated ejection fraction is 60%.  Normal left ventricular diastolic function.  Normal right ventricular size with normal right ventricular systolic function.  Mild aortic regurgitation.  Normal central venous pressure (3 mmHg).       Echo 6.22.22    Interpretation Summary  · Concentric hypertrophy and severely decreased systolic  function.  · The estimated ejection fraction is 25%.  · Grade I left ventricular diastolic dysfunction.  · Mild aortic regurgitation.  · Normal right ventricular size with normal right ventricular systolic function.  · Intermediate central venous pressure (8 mmHg).  · The estimated PA systolic pressure is 16 mmHg.  · There is severe left ventricular global hypokinesis.    Cardiac Catheterization 8.1.22    Conclusion  · The pre-procedure left ventricular end diastolic pressure was 7.  · The 1st Mrg lesion was 60% stenosed.  · The estimated blood loss was none.  · There was non-obstructive coronary artery disease..    FINDINGS  The patient has Moderate CAD  Blood loss:  less than 10 cc.    RECOMMENDATIONS  Medical management  Maximize GDMT for patient's cardiomyopathy. Avoid cardiac toxins.  Risk factor modifications  Activity -- avoid straining with affected limb for one week  Exercise on regular basis.    EKG done 6/23  Normal sinus rhythm, rate 60. Normal ECG       Past Medical History:   has a past medical history of CHF (congestive heart failure), Diabetes mellitus, type 2, IGT (impaired glucose tolerance), Kidney stones, Low testosterone in male, Migraine, Morbid obesity, and Vitamin D deficiency. HLD    Past Surgical History:   has a past surgical history that includes rotator cuff surgery and kidney stones removal.     Family History:  family history includes Cancer in his mother; Heart disease in his father.     Social History:   reports that he has never smoked. He has never used smokeless tobacco. He reports previous alcohol use. He reports that he does not use drugs.                                                                                                                                                                                                                                                                                         Patient Active Problem List   Diagnosis    Nocturia    Dizziness     Fatigue    IGT (impaired glucose tolerance)    Chest pain    Asthma    Low testosterone    Mixed diabetic hyperlipidemia associated with type 2 diabetes mellitus    Type 2 diabetes mellitus with hyperglycemia, without long-term current use of insulin    Nonischemic cardiomyopathy    BMI 38.0-38.9,adult    Screen for colon cancer    FH: colon cancer in relative diagnosed at >50 years old    Hx of non anemic vitamin B12 deficiency    Immunization due    Thunderclap headache    Headache    Migraine with aura    Mild intermittent asthma    Class 2 severe obesity due to excess calories with serious comorbidity and body mass index (BMI) of 38.0 to 38.9 in adult    Seasonal allergies    Severe acute respiratory syndrome coronavirus 2 (SARS-CoV-2) vaccination not indicated    Primary hypertension    Mixed hyperlipidemia    Vitamin D deficiency    Angina of effort    Hypertension associated with type 2 diabetes mellitus    Epigastric abdominal pain     Past Surgical History:   Procedure Laterality Date    ANGIOGRAM, CORONARY, WITH LEFT HEART CATHETERIZATION N/A 08/01/2022    Procedure: Angiogram, Coronary, with Left Heart Cath;  Surgeon: Lev Manuel MD;  Location: [facility] CATH LAB;  Service: Cardiology;  Laterality: N/A;    kidney stones removal      rotator cuff surgery       Social History     Socioeconomic History    Marital status:     Number of children: 2   Occupational History    Occupation: medical assistant    Occupation: Medical Assistant     Employer: [employer]     Comment: [employer]   Tobacco Use    Smoking status: Never    Smokeless tobacco: Never   Substance and Sexual Activity    Alcohol use: Never    Drug use: Never    Sexual activity: Yes     Partners: Female     Birth control/protection: None     Social Determinants of Health     Financial Resource Strain: Low Risk  (10/11/2023)    Overall Financial Resource Strain (CARDIA)     Difficulty of Paying Living Expenses: Not very hard   Transportation Needs:  No Transportation Needs (10/11/2023)    PRAPARE - Transportation     Lack of Transportation (Medical): No     Lack of Transportation (Non-Medical): No   Physical Activity: Unknown (10/11/2023)    Exercise Vital Sign     Minutes of Exercise per Session: 20 min   Stress: No Stress Concern Present (10/11/2023)    [employer] [place] [employer] - Occupational Stress Questionnaire     Feeling of Stress : Only a little   Housing Stability: Low Risk  (10/11/2023)    Housing Stability Vital Sign     Unable to Pay for Housing in the Last Year: No     Number of Places Lived in the Last Year: 1     Unstable Housing in the Last Year: No        Family History   Problem Relation Name Age of Onset    Cancer Mother      Heart disease Father      No Known Problems Sister      No Known Problems Brother           Current Outpatient Medications:     albuterol (PROVENTIL/VENTOLIN HFA) 90 mcg/actuation inhaler, Inhale 2 puffs into the lungs daily as needed for Shortness of Breath., Disp: 18 g, Rfl: 0    aspirin (ECOTRIN) 81 MG EC tablet, Take 1 tablet (81 mg total) by mouth once daily., Disp: 90 tablet, Rfl: 3    atorvastatin (LIPITOR) 40 MG tablet, Take 1 tablet (40 mg total) by mouth once daily., Disp: 90 tablet, Rfl: 3    empagliflozin (JARDIANCE) 10 mg tablet, Take 1 tablet (10 mg total) by mouth once daily., Disp: 90 tablet, Rfl: 3    ergocalciferol, vitamin D2, (VITAMIN D ORAL), Take 2,000 Units by mouth., Disp: , Rfl:     metFORMIN (GLUCOPHAGE) 500 MG tablet, Take 1 tablet (500 mg total) by mouth 2 (two) times daily with meals., Disp: 60 tablet, Rfl: 3    metoprolol succinate (TOPROL-XL) 25 MG 24 hr tablet, Take 1 tablet (25 mg total) by mouth once daily., Disp: 90 tablet, Rfl: 3    MOUNJARO 15 mg/0.5 mL PnIj, SMARTSIG:15 Milligram(s) SUB-Q Once a Week, Disp: , Rfl:     NURTEC 75 mg odt, Take 1 tablet (75 mg total) by mouth daily as needed for Migraine., Disp: 8 tablet, Rfl: 1    sacubitriL-valsartan (ENTRESTO) 24-26 mg  "per tablet, Take 1 tablet by mouth 2 (two) times daily., Disp: 180 tablet, Rfl: 3    sildenafiL (VIAGRA) 50 MG tablet, Take 1 tablet (50 mg total) by mouth daily as needed for Erectile Dysfunction., Disp: 10 tablet, Rfl: 2    baclofen (LIORESAL) 10 MG tablet, Take 1 tablet (10 mg total) by mouth 3 (three) times daily as needed (neck stiffness). (Patient not taking: Reported on 7/12/2024), Disp: 9 tablet, Rfl: 0    BD ULTRA-FINE MINI PEN NEEDLE 31 gauge x 3/16" Ndle, SMARTSIG:Pre-Filled Pen Syringe SUB-Q As Directed, Disp: , Rfl:     blood-glucose meter Misc, 1 Device by Misc.(Non-Drug; Combo Route) route 3 (three) times daily before meals. (Patient not taking: Reported on 7/11/2024), Disp: 1 each, Rfl: 0    lancets Misc, 1 lancet by Misc.(Non-Drug; Combo Route) route 3 (three) times daily before meals., Disp: 100 each, Rfl: 11    lancing device Misc, 1 Device by Misc.(Non-Drug; Combo Route) route 3 (three) times daily before meals., Disp: 1 each, Rfl: 0    liraglutide 0.6 mg/0.1 mL, 18 mg/3 mL, subq PNIJ (VICTOZA 3-AGUSTIN) 0.6 mg/0.1 mL (18 mg/3 mL) PnIj pen, Inject 0.6 mg into the skin once daily for 7 days, THEN 1.2 mg once daily for 7 days, THEN 1.8 mg once daily. (Patient not taking: Reported on 7/11/2024), Disp: 107.4 mL, Rfl: 0    meclizine (ANTIVERT) 25 mg tablet, Take 1 tablet (25 mg total) by mouth 3 (three) times daily as needed for Dizziness. (Patient not taking: Reported on 7/11/2024), Disp: 14 tablet, Rfl: 0    ondansetron (ZOFRAN-ODT) 4 MG TbDL, Take 1 tablet (4 mg total) by mouth every 6 (six) hours as needed (nausea/vomiting). (Patient not taking: Reported on 7/11/2024), Disp: 14 tablet, Rfl: 0    pen needle, diabetic 33 gauge x 5/32" Ndle, 365 Units by Misc.(Non-Drug; Combo Route) route once daily., Disp: 365 each, Rfl: 0    TECHLITE PEN NEEDLE 32 gauge x 5/32" Ndle, USE 1 DAILY. 100 DAY SUPPLY, Disp: , Rfl:     testosterone cypionate (DEPOTESTOTERONE CYPIONATE) 100 mg/mL injection, Inject 1 mL (100 mg " "total) into the muscle every 14 (fourteen) days. for 10 doses (Patient not taking: Reported on 10/10/2023), Disp: 10 mL, Rfl: 0    Current Facility-Administered Medications:     sodium chloride 0.9% flush 10 mL, 10 mL, Intravenous, PRN, Lev Manuel MD     ROS:                                                                                                                                                                             Review of Systems   Constitutional: Negative.    HENT: Negative.     Eyes: Negative.    Respiratory: Negative.  Negative for shortness of breath.    Cardiovascular: Negative.    Gastrointestinal: Negative.    Genitourinary: Negative.    Musculoskeletal: Negative.    Skin: Negative.    Neurological: Negative.    Endo/Heme/Allergies: Negative.    Psychiatric/Behavioral: Negative.          Blood pressure 112/74, pulse 64, temperature 97.8 °F (36.6 °C), temperature source Oral, resp. rate 20, height 5' 8" (1.727 m), weight 110 kg (242 lb 9.6 oz), SpO2 100%.   PE:  Physical Exam  Constitutional:       Appearance: Normal appearance.   HENT:      Head: Normocephalic.   Eyes:      Pupils: Pupils are equal, round, and reactive to light.   Cardiovascular:      Rate and Rhythm: Normal rate and regular rhythm.      Pulses: Normal pulses.   Pulmonary:      Effort: Pulmonary effort is normal.   Musculoskeletal:         General: Normal range of motion.   Skin:     General: Skin is warm and dry.   Neurological:      General: No focal deficit present.      Mental Status: He is alert and oriented to person, place, and time.   Psychiatric:         Mood and Affect: Mood normal.         Behavior: Behavior normal.                ASSESSMENT/PLAN:  NICMO (recovered EF) NYHA Class I  - LVEF- 60% per ECHO 1.13.23 recovered -->LVEF- 25% per ECHO June 2022  - [facility] - moderate CAD (60% 1st Mrg lesion ) (8.1.22)  - Denies SOB, RAYO   - Euvolemic upon exam, warm and dry  - Continue GDMT: Entresto 24/26 mg BID, " Metoprolol succinate 25 mg qd.    - Counseled patient on low-salt diet and s/s of volume overload     Nonobstructive  CAD  - [facility] - moderate CAD (60% 1st Mrg lesion ) (8.1.22)  - Denies any cardiac complaints   - Continue ASA, Atorvastatin 40 mg, metoprolol succinate 25 mg   - Instructed patient to continue with lifestyle modifications, including heart healthy, low-cholesterol diet and exercise as tolerated     HLD  - LDL -68- at goal   - Continue Atorvastatin 40 mg  - Counseled on heart healthy, low-cholesterol diet activity as tolerated    DM II  - A1C- 5.7  - Continue management per PCP     Obesity  - Counseled on weight loss measures through diet and increased exercise as tolerated    Dizziness  - Reports intermittent episodes of dizziness that occurred approximately 2 months ago.  Will obtain a Carotid Ultrasound given history and risk factors to assess for any obstructive stenosis.      Carotid US. Will notify of results   Follow-up in Cardiology Clinic in 6 months or sooner if needed  Follow up with PCP as directed

## 2024-07-12 NOTE — PATIENT INSTRUCTIONS
Carotid US. Will notify of results   Follow-up in Cardiology Clinic in 6 months or sooner if needed  Follow up with PCP as directed

## 2024-07-14 LAB
TESTOST FREE SERPL-MCNC: 6.93 NG/DL (ref 3.87–14.7)
TESTOST SERPL-MCNC: 276 NG/DL (ref 240–950)

## 2024-07-26 ENCOUNTER — HOSPITAL ENCOUNTER (OUTPATIENT)
Dept: RADIOLOGY | Facility: HOSPITAL | Age: 59
Discharge: HOME | End: 2024-07-26
Attending: NURSE PRACTITIONER
Payer: COMMERCIAL

## 2024-07-26 DIAGNOSIS — R42 DIZZINESS: ICD-10-CM

## 2024-07-26 LAB
LEFT CCA DIST DIAS: 27 CM/S
LEFT CCA DIST SYS: 82 CM/S
LEFT CCA PROX DIAS: 27 CM/S
LEFT CCA PROX SYS: 103 CM/S
LEFT ECA DIAS: 11 CM/S
LEFT ECA SYS: 73 CM/S
LEFT ICA DIST DIAS: 29 CM/S
LEFT ICA DIST SYS: 70 CM/S
LEFT ICA MID DIAS: 26 CM/S
LEFT ICA MID SYS: 64 CM/S
LEFT ICA PROX DIAS: 17 CM/S
LEFT ICA PROX SYS: 40 CM/S
LEFT VERTEBRAL DIAS: 14 CM/S
LEFT VERTEBRAL SYS: 49 CM/S
OHS CV CAROTID RIGHT ICA EDV HIGHEST: 29
OHS CV CAROTID ULTRASOUND LEFT ICA/CCA RATIO: 0.85
OHS CV CAROTID ULTRASOUND RIGHT ICA/CCA RATIO: 0.66
OHS CV PV CAROTID LEFT HIGHEST CCA: 103
OHS CV PV CAROTID LEFT HIGHEST ICA: 70
OHS CV PV CAROTID RIGHT HIGHEST CCA: 102
OHS CV PV CAROTID RIGHT HIGHEST ICA: 67
OHS CV US CAROTID LEFT HIGHEST EDV: 29
RIGHT CCA DIST DIAS: 26 CM/S
RIGHT CCA DIST SYS: 101 CM/S
RIGHT CCA PROX DIAS: 25 CM/S
RIGHT CCA PROX SYS: 102 CM/S
RIGHT ECA DIAS: 14 CM/S
RIGHT ECA SYS: 89 CM/S
RIGHT ICA DIST DIAS: 29 CM/S
RIGHT ICA DIST SYS: 67 CM/S
RIGHT ICA MID DIAS: 24 CM/S
RIGHT ICA MID SYS: 63 CM/S
RIGHT ICA PROX DIAS: 13 CM/S
RIGHT ICA PROX SYS: 51 CM/S
RIGHT VERTEBRAL DIAS: 14 CM/S
RIGHT VERTEBRAL SYS: 47 CM/S

## 2024-07-26 PROCEDURE — 93880 EXTRACRANIAL BILAT STUDY: CPT

## 2024-08-02 ENCOUNTER — TELEPHONE (OUTPATIENT)
Dept: CARDIOLOGY | Facility: CLINIC | Age: 59
End: 2024-08-02
Payer: COMMERCIAL

## 2024-08-06 DIAGNOSIS — M25.511 ACUTE PAIN OF RIGHT SHOULDER: Primary | ICD-10-CM

## 2024-08-15 DIAGNOSIS — I15.2 HYPERTENSION ASSOCIATED WITH TYPE 2 DIABETES MELLITUS: Chronic | ICD-10-CM

## 2024-08-15 DIAGNOSIS — E78.2 MIXED DIABETIC HYPERLIPIDEMIA ASSOCIATED WITH TYPE 2 DIABETES MELLITUS: ICD-10-CM

## 2024-08-15 DIAGNOSIS — E11.65 TYPE 2 DIABETES MELLITUS WITH HYPERGLYCEMIA, WITH LONG-TERM CURRENT USE OF INSULIN: ICD-10-CM

## 2024-08-15 DIAGNOSIS — Z79.4 TYPE 2 DIABETES MELLITUS WITH HYPERGLYCEMIA, WITH LONG-TERM CURRENT USE OF INSULIN: ICD-10-CM

## 2024-08-15 DIAGNOSIS — E11.59 HYPERTENSION ASSOCIATED WITH TYPE 2 DIABETES MELLITUS: Chronic | ICD-10-CM

## 2024-08-15 DIAGNOSIS — E11.69 MIXED DIABETIC HYPERLIPIDEMIA ASSOCIATED WITH TYPE 2 DIABETES MELLITUS: ICD-10-CM

## 2024-08-15 RX ORDER — METFORMIN HYDROCHLORIDE 500 MG/1
500 TABLET ORAL 2 TIMES DAILY WITH MEALS
Qty: 60 TABLET | Refills: 3 | Status: SHIPPED | OUTPATIENT
Start: 2024-08-15

## 2024-08-30 ENCOUNTER — HOSPITAL ENCOUNTER (OUTPATIENT)
Dept: RADIOLOGY | Facility: HOSPITAL | Age: 59
Discharge: HOME | End: 2024-08-30
Attending: INTERNAL MEDICINE
Payer: COMMERCIAL

## 2024-08-30 DIAGNOSIS — M25.511 ACUTE PAIN OF RIGHT SHOULDER: ICD-10-CM

## 2024-08-30 PROCEDURE — 73030 X-RAY EXAM OF SHOULDER: CPT | Mod: TC,RT

## 2024-09-04 DIAGNOSIS — M25.511 RIGHT SHOULDER PAIN, UNSPECIFIED CHRONICITY: Primary | ICD-10-CM

## 2024-09-24 ENCOUNTER — OFFICE VISIT (OUTPATIENT)
Dept: ORTHOPEDICS | Facility: CLINIC | Age: 59
End: 2024-09-24
Payer: COMMERCIAL

## 2024-09-24 VITALS
DIASTOLIC BLOOD PRESSURE: 82 MMHG | WEIGHT: 249 LBS | SYSTOLIC BLOOD PRESSURE: 120 MMHG | BODY MASS INDEX: 37.74 KG/M2 | HEIGHT: 68 IN

## 2024-09-24 DIAGNOSIS — M75.111 NONTRAUMATIC INCOMPLETE TEAR OF RIGHT ROTATOR CUFF: ICD-10-CM

## 2024-09-24 PROCEDURE — 1160F RVW MEDS BY RX/DR IN RCRD: CPT | Mod: CPTII,,, | Performed by: PHYSICIAN ASSISTANT

## 2024-09-24 PROCEDURE — 3008F BODY MASS INDEX DOCD: CPT | Mod: CPTII,,, | Performed by: PHYSICIAN ASSISTANT

## 2024-09-24 PROCEDURE — 99215 OFFICE O/P EST HI 40 MIN: CPT | Mod: PBBFAC,25

## 2024-09-24 PROCEDURE — 3074F SYST BP LT 130 MM HG: CPT | Mod: CPTII,,, | Performed by: PHYSICIAN ASSISTANT

## 2024-09-24 PROCEDURE — 4010F ACE/ARB THERAPY RXD/TAKEN: CPT | Mod: CPTII,,, | Performed by: PHYSICIAN ASSISTANT

## 2024-09-24 PROCEDURE — 99214 OFFICE O/P EST MOD 30 MIN: CPT | Mod: 25,S$PBB,, | Performed by: PHYSICIAN ASSISTANT

## 2024-09-24 PROCEDURE — 3044F HG A1C LEVEL LT 7.0%: CPT | Mod: CPTII,,, | Performed by: PHYSICIAN ASSISTANT

## 2024-09-24 PROCEDURE — 3079F DIAST BP 80-89 MM HG: CPT | Mod: CPTII,,, | Performed by: PHYSICIAN ASSISTANT

## 2024-09-24 PROCEDURE — 20610 DRAIN/INJ JOINT/BURSA W/O US: CPT | Mod: PBBFAC,RT | Performed by: PHYSICIAN ASSISTANT

## 2024-09-24 PROCEDURE — 1159F MED LIST DOCD IN RCRD: CPT | Mod: CPTII,,, | Performed by: PHYSICIAN ASSISTANT

## 2024-09-24 RX ORDER — TRIAMCINOLONE ACETONIDE 40 MG/ML
40 INJECTION, SUSPENSION INTRA-ARTICULAR; INTRAMUSCULAR
Status: DISCONTINUED | OUTPATIENT
Start: 2024-09-24 | End: 2024-09-24 | Stop reason: HOSPADM

## 2024-09-24 RX ORDER — TRIAMCINOLONE ACETONIDE 40 MG/ML
40 INJECTION, SUSPENSION INTRA-ARTICULAR; INTRAMUSCULAR
Status: COMPLETED | OUTPATIENT
Start: 2024-09-24 | End: 2024-09-24

## 2024-09-24 RX ORDER — LIDOCAINE HYDROCHLORIDE 10 MG/ML
5 INJECTION, SOLUTION EPIDURAL; INFILTRATION; INTRACAUDAL; PERINEURAL
Status: COMPLETED | OUTPATIENT
Start: 2024-09-24 | End: 2024-09-24

## 2024-09-24 RX ORDER — LIDOCAINE HYDROCHLORIDE 20 MG/ML
2 INJECTION, SOLUTION INFILTRATION; PERINEURAL
Status: DISCONTINUED | OUTPATIENT
Start: 2024-09-24 | End: 2024-09-24 | Stop reason: HOSPADM

## 2024-09-24 RX ADMIN — LIDOCAINE HYDROCHLORIDE 2 MG: 20 INJECTION, SOLUTION INFILTRATION; PERINEURAL at 01:09

## 2024-09-24 RX ADMIN — LIDOCAINE HYDROCHLORIDE 50 MG: 10 INJECTION, SOLUTION EPIDURAL; INFILTRATION; INTRACAUDAL; PERINEURAL at 01:09

## 2024-09-24 RX ADMIN — TRIAMCINOLONE ACETONIDE 40 MG: 40 INJECTION, SUSPENSION INTRA-ARTICULAR; INTRAMUSCULAR at 01:09

## 2024-09-24 NOTE — PROGRESS NOTES
"  Chief Complaint:   Chief Complaint   Patient presents with    Right Shoulder - Pain     Patient is here today for follow up for  right shoulder pain.  Has been taking meds; pain: NSAID Which has as needed for pain, which does help.         History of present illness:    59-year-old right-hand dominant male presents office today for evaluation his right shoulder pain.  His pain has been present for nearly 2 months with no associated injury.  Pain gradually worsened, worse at night.  He notes pain to the anterior and lateral aspect of the shoulder.  Pain is worse with overhead movements.  He has a history of rotator cuff repair nearly 20 years ago.  He has had no issues with the right shoulder prior to 2 months ago.  He denies any numbness or tingling.  Currently taking no medication for pain.  He does have a history of type 2 diabetes but well controlled with last hemoglobin A1c at 5.7    Past Medical History:   Diagnosis Date    CHF (congestive heart failure)     Diabetes mellitus, type 2     Hyperlipidemia     IGT (impaired glucose tolerance)     Kidney stones     Low testosterone in male     Migraine     Morbid obesity     Vitamin D deficiency        Past Surgical History:   Procedure Laterality Date    ANGIOGRAM, CORONARY, WITH LEFT HEART CATHETERIZATION N/A 08/01/2022    Procedure: Angiogram, Coronary, with Left Heart Cath;  Surgeon: Lev Manuel MD;  Location: [facility] CATH LAB;  Service: Cardiology;  Laterality: N/A;    kidney stones removal      rotator cuff surgery         Current Outpatient Medications   Medication Sig    albuterol (PROVENTIL/VENTOLIN HFA) 90 mcg/actuation inhaler Inhale 2 puffs into the lungs daily as needed for Shortness of Breath.    aspirin (ECOTRIN) 81 MG EC tablet Take 1 tablet (81 mg total) by mouth once daily.    atorvastatin (LIPITOR) 40 MG tablet Take 1 tablet (40 mg total) by mouth once daily.    BD ULTRA-FINE MINI PEN NEEDLE 31 gauge x 3/16" Ndle SMARTSIG:Pre-Filled Pen " "Syringe SUB-Q As Directed    blood-glucose meter Misc 1 Device by Misc.(Non-Drug; Combo Route) route 3 (three) times daily before meals.    empagliflozin (JARDIANCE) 10 mg tablet Take 1 tablet (10 mg total) by mouth once daily.    ergocalciferol, vitamin D2, (VITAMIN D ORAL) Take 2,000 Units by mouth.    lancets Misc 1 lancet by Misc.(Non-Drug; Combo Route) route 3 (three) times daily before meals.    lancing device Misc 1 Device by Misc.(Non-Drug; Combo Route) route 3 (three) times daily before meals.    liraglutide 0.6 mg/0.1 mL, 18 mg/3 mL, subq PNIJ (VICTOZA 3-AGUSTIN) 0.6 mg/0.1 mL (18 mg/3 mL) PnIj pen Inject 0.6 mg into the skin once daily for 7 days, THEN 1.2 mg once daily for 7 days, THEN 1.8 mg once daily.    meclizine (ANTIVERT) 25 mg tablet Take 1 tablet (25 mg total) by mouth 3 (three) times daily as needed for Dizziness.    metFORMIN (GLUCOPHAGE) 500 MG tablet Take 1 tablet (500 mg total) by mouth 2 (two) times daily with meals.    metoprolol succinate (TOPROL-XL) 25 MG 24 hr tablet Take 1 tablet (25 mg total) by mouth once daily.    MOUNJARO 15 mg/0.5 mL PnIj SMARTSIG:15 Milligram(s) SUB-Q Once a Week    NURTEC 75 mg odt Take 1 tablet (75 mg total) by mouth daily as needed for Migraine.    ondansetron (ZOFRAN-ODT) 4 MG TbDL Take 1 tablet (4 mg total) by mouth every 6 (six) hours as needed (nausea/vomiting).    pen needle, diabetic 33 gauge x 5/32" Ndle 365 Units by Misc.(Non-Drug; Combo Route) route once daily.    sacubitriL-valsartan (ENTRESTO) 24-26 mg per tablet Take 1 tablet by mouth 2 (two) times daily.    TECHLITE PEN NEEDLE 32 gauge x 5/32" Ndle USE 1 DAILY. 100 DAY SUPPLY    baclofen (LIORESAL) 10 MG tablet Take 1 tablet (10 mg total) by mouth 3 (three) times daily as needed (neck stiffness). (Patient not taking: Reported on 7/12/2024)    sildenafiL (VIAGRA) 50 MG tablet Take 1 tablet (50 mg total) by mouth daily as needed for Erectile Dysfunction.    testosterone cypionate (DEPOTESTOTERONE " CYPIONATE) 100 mg/mL injection Inject 1 mL (100 mg total) into the muscle every 14 (fourteen) days. for 10 doses (Patient not taking: Reported on 10/10/2023)     Current Facility-Administered Medications   Medication    LIDOcaine (PF) 10 mg/ml (1%) injection 50 mg    sodium chloride 0.9% flush 10 mL    triamcinolone acetonide injection 40 mg       Review of patient's allergies indicates:  No Known Allergies    Family History   Problem Relation Name Age of Onset    Cancer Mother      Heart disease Father      No Known Problems Sister      No Known Problems Brother         Social History     Socioeconomic History    Marital status:     Number of children: 2   Occupational History    Occupation: medical assistant    Occupation: Medical Assistant     Employer: [employer]     Comment: [employer]   Tobacco Use    Smoking status: Never    Smokeless tobacco: Never   Substance and Sexual Activity    Alcohol use: Never    Drug use: Never    Sexual activity: Yes     Partners: Female     Birth control/protection: None     Social Determinants of Health     Financial Resource Strain: Low Risk  (10/11/2023)    Overall Financial Resource Strain (CARDIA)     Difficulty of Paying Living Expenses: Not very hard   Transportation Needs: No Transportation Needs (10/11/2023)    PRAPARE - Transportation     Lack of Transportation (Medical): No     Lack of Transportation (Non-Medical): No   Physical Activity: Unknown (10/11/2023)    Exercise Vital Sign     Minutes of Exercise per Session: 20 min   Stress: No Stress Concern Present (10/11/2023)    [employer] - Occupational Stress Questionnaire     Feeling of Stress : Only a little   Housing Stability: Low Risk  (10/11/2023)    Housing Stability Vital Sign     Unable to Pay for Housing in the Last Year: No     Number of Places Lived in the Last Year: 1     Unstable Housing in the Last Year: No         Review of Systems:    Constitution:   Denies chills, fever, and  "sweats.  HENT:   Denies headaches or blurry vision.  Cardiovascular:  Denies chest pain or irregular heart beat.  Respiratory:   Denies cough or shortness of breath.  Gastrointestinal:  Denies abdominal pain, nausea, or vomiting.  Musculoskeletal:   Denies muscle cramps.  Neurological:   Denies dizziness or focal weakness.  Psychiatric/Behavior: Normal mental status.  Hematology/Lymph:  Denies bleeding problem or easy bruising/bleeding.  Skin:    Denies rash or suspicious lesions.    Examination:    Vital Signs:    Vitals:    09/24/24 1324 09/24/24 1326   BP:  120/82   Weight: 112.9 kg (249 lb)    Height: 5' 8" (1.727 m)    PainSc:  10-Worst pain ever       Body mass index is 37.86 kg/m².    Constitution:   Well-developed, well nourished patient in no acute distress.  Neurological:   Alert and oriented x 3 and cooperative to examination.     Psychiatric/Behavior: Normal mental status.  Respiratory:   No shortness of breath.  Nonlabored breathing  Cardiovascular:           Regular rate and rhythm  Eyes:    Extraoccular muscles intact  Skin:    No scars, rash or suspicious lesions.    Physical Exam:     Right Shoulder:     No swelling, erythema or increased heat    Tender over deltoid, supraspinatus and infraspinatus    Tender over bicipital groove  No tenderness over the AC joint or distal clavicle    negative drop arm test   Positive Neer and Zavaleta impingement signs    Mild-to-moderate weakness with rotator cuff resistance, specifically resisted shoulder abduction and external rotation     Active shoulder abduction 160  Active forward elevation  160  Active internal rotation 70   Active external rotation 80     Radiographs of the right shoulder reviewed showing no acute osseous findings.  There is a healed chronic fracture deformity of the distal 3rd clavicle.  AC joint intact.        Assessment:     Nontraumatic incomplete tear of right rotator cuff  -     Ambulatory referral/consult to Orthopedics  -     X-ray " Shoulder 2 or More Views Right; Future; Expected date: 09/24/2024  -     Large Joint Aspiration/Injection: R subacromial bursa  -     triamcinolone acetonide injection 40 mg  -     LIDOcaine (PF) 10 mg/ml (1%) injection 50 mg        Plan:      I have discussed exam and x-ray findings with the patient today.  I do feel that he has a likely partial thickness rotator cuff tear involving supraspinatus and probable infraspinatus.  I would like to try a subacromial corticosteroid injection today and some physical therapy.  We have discussed the risks of cortisone injection with his type 2 diabetes.  This may elevate his blood sugar.  His sugars are controlled in his last A1c is 5.7.  He is okay with proceeding on with the injection.  Physical therapy ordered and he will follow up in 6 weeks for repeat evaluation.  If no clinical improvement or worsening of symptoms we discussed MRI of the right shoulder to evaluate degree of rotator cuff tearing.        Follow up in about 6 weeks (around 11/5/2024).    DISCLAIMER: This note may have been dictated using voice recognition software and may contain grammatical errors.

## 2024-09-24 NOTE — PROCEDURES
Large Joint Aspiration/Injection: R subacromial bursa    Date/Time: 9/24/2024 1:20 PM    Performed by: Dominguez Mccollum PA  Authorized by: Dominguez Mccollum PA    Consent Done?:  Yes (Verbal)  Indications:  Pain  Site marked: the procedure site was marked    Timeout: prior to procedure the correct patient, procedure, and site was verified    Prep: patient was prepped and draped in usual sterile fashion    Approach:  Posterior  Location:  Shoulder  Site:  R subacromial bursa  Medications:  2 mg LIDOcaine HCL 20 mg/ml (2%) 20 mg/mL (2 %); 40 mg triamcinolone acetonide 40 mg/mL  Patient tolerance:  Patient tolerated the procedure well with no immediate complications

## 2024-10-24 DIAGNOSIS — Z79.4 TYPE 2 DIABETES MELLITUS WITH HYPERGLYCEMIA, WITH LONG-TERM CURRENT USE OF INSULIN: ICD-10-CM

## 2024-10-24 DIAGNOSIS — I50.22 CHRONIC SYSTOLIC CONGESTIVE HEART FAILURE: ICD-10-CM

## 2024-10-24 DIAGNOSIS — I15.2 HYPERTENSION ASSOCIATED WITH TYPE 2 DIABETES MELLITUS: Chronic | ICD-10-CM

## 2024-10-24 DIAGNOSIS — E11.65 TYPE 2 DIABETES MELLITUS WITH HYPERGLYCEMIA, WITH LONG-TERM CURRENT USE OF INSULIN: ICD-10-CM

## 2024-10-24 DIAGNOSIS — E11.00 TYPE 2 DIABETES MELLITUS WITH HYPEROSMOLARITY WITHOUT COMA, WITHOUT LONG-TERM CURRENT USE OF INSULIN: ICD-10-CM

## 2024-10-24 DIAGNOSIS — E11.59 HYPERTENSION ASSOCIATED WITH TYPE 2 DIABETES MELLITUS: Chronic | ICD-10-CM

## 2024-10-24 DIAGNOSIS — I42.8 NONISCHEMIC CARDIOMYOPATHY: ICD-10-CM

## 2024-10-24 DIAGNOSIS — E11.69 MIXED DIABETIC HYPERLIPIDEMIA ASSOCIATED WITH TYPE 2 DIABETES MELLITUS: ICD-10-CM

## 2024-10-24 DIAGNOSIS — I25.10 CORONARY ARTERY DISEASE INVOLVING NATIVE CORONARY ARTERY OF NATIVE HEART WITHOUT ANGINA PECTORIS: ICD-10-CM

## 2024-10-24 DIAGNOSIS — E78.2 MIXED DIABETIC HYPERLIPIDEMIA ASSOCIATED WITH TYPE 2 DIABETES MELLITUS: ICD-10-CM

## 2024-10-24 RX ORDER — METFORMIN HYDROCHLORIDE 500 MG/1
500 TABLET ORAL 2 TIMES DAILY WITH MEALS
Qty: 60 TABLET | Refills: 3 | Status: SHIPPED | OUTPATIENT
Start: 2024-10-24

## 2024-10-24 RX ORDER — TIRZEPATIDE 15 MG/.5ML
15 INJECTION, SOLUTION SUBCUTANEOUS WEEKLY
Qty: 2 ML | Refills: 5 | Status: SHIPPED | OUTPATIENT
Start: 2024-10-24 | End: 2025-04-04

## 2024-10-24 RX ORDER — ASPIRIN 81 MG/1
81 TABLET ORAL DAILY
Qty: 90 TABLET | Refills: 3 | Status: SHIPPED | OUTPATIENT
Start: 2024-10-24 | End: 2025-10-24

## 2024-12-02 RX ORDER — TIRZEPATIDE 15 MG/.5ML
15 INJECTION, SOLUTION SUBCUTANEOUS WEEKLY
Qty: 2 ML | Refills: 5 | Status: SHIPPED | OUTPATIENT
Start: 2024-12-02 | End: 2025-05-13

## 2024-12-30 ENCOUNTER — OFFICE VISIT (OUTPATIENT)
Dept: URGENT CARE | Facility: CLINIC | Age: 59
End: 2024-12-30
Payer: COMMERCIAL

## 2024-12-30 VITALS
HEART RATE: 84 BPM | WEIGHT: 250 LBS | TEMPERATURE: 98 F | BODY MASS INDEX: 37.89 KG/M2 | OXYGEN SATURATION: 98 % | DIASTOLIC BLOOD PRESSURE: 77 MMHG | RESPIRATION RATE: 18 BRPM | SYSTOLIC BLOOD PRESSURE: 111 MMHG | HEIGHT: 68 IN

## 2024-12-30 DIAGNOSIS — R09.82 PND (POST-NASAL DRIP): Primary | ICD-10-CM

## 2024-12-30 DIAGNOSIS — R68.89 FLU-LIKE SYMPTOMS: ICD-10-CM

## 2024-12-30 DIAGNOSIS — R06.00 PND (PAROXYSMAL NOCTURNAL DYSPNEA): ICD-10-CM

## 2024-12-30 LAB
CTP QC/QA: YES
CTP QC/QA: YES
POC MOLECULAR INFLUENZA A AGN: NEGATIVE
POC MOLECULAR INFLUENZA B AGN: NEGATIVE
SARS-COV-2 AG RESP QL IA.RAPID: NEGATIVE

## 2024-12-30 PROCEDURE — 87811 SARS-COV-2 COVID19 W/OPTIC: CPT | Mod: PBBFAC

## 2024-12-30 PROCEDURE — 99213 OFFICE O/P EST LOW 20 MIN: CPT | Mod: PBBFAC

## 2024-12-30 PROCEDURE — 99213 OFFICE O/P EST LOW 20 MIN: CPT | Mod: S$PBB,,,

## 2024-12-30 PROCEDURE — 87502 INFLUENZA DNA AMP PROBE: CPT | Mod: PBBFAC

## 2024-12-30 RX ORDER — LEVOCETIRIZINE DIHYDROCHLORIDE 5 MG/1
5 TABLET, FILM COATED ORAL NIGHTLY
Qty: 30 TABLET | Refills: 0 | Status: SHIPPED | OUTPATIENT
Start: 2024-12-30 | End: 2025-01-29

## 2024-12-30 RX ORDER — FLUTICASONE PROPIONATE 50 MCG
1 SPRAY, SUSPENSION (ML) NASAL DAILY PRN
Qty: 9.9 ML | Refills: 0 | Status: SHIPPED | OUTPATIENT
Start: 2024-12-30

## 2024-12-30 NOTE — LETTER
December 30, 2024      [facility]  [facility] [facility] [address]  Phone: [phone]       Patient: Chadwick Lorenzo   YOB: 1965  Date of Visit: 12/30/2024    To Whom It May Concern:    Luzmaria Lorenzo  was at [facility] on 12/30/2024. The patient may return to work/school on 01/02/2025 with no restrictions. If you have any questions or concerns, or if I can be of further assistance, please do not hesitate to contact me.    Sincerely,    BHARTI Mcgee

## 2024-12-31 NOTE — PROGRESS NOTES
"Subjective:       Patient ID: Chadwick Lorenzo is a 59 y.o. male.    Vitals:  height is 5' 8" (1.727 m) and weight is 113.4 kg (250 lb). His oral temperature is 98.4 °F (36.9 °C). His blood pressure is 111/77 and his pulse is 84. His respiration is 18 and oxygen saturation is 98%.     Chief Complaint: flu like symptoms  (Ha, body aches, cough, nasal drip, congestion, chills, scratchy throat x 1 day )    58 y/o AAM presents to  alone, he is c/o symptoms x 1 day, has not taken any OTC medications. States cough is triggered by PND.         Constitution: Negative for fever.   HENT:  Positive for congestion, postnasal drip and sore throat. Negative for ear pain, trouble swallowing and voice change.    Respiratory:  Positive for cough and asthma.    Allergic/Immunologic: Positive for asthma. Negative for environmental allergies and seasonal allergies.       Objective:      Physical Exam   Constitutional: He is oriented to person, place, and time. He is cooperative. He is easily aroused.  Non-toxic appearance. He does not appear ill. obesityawake  HENT:   Head: Normocephalic and atraumatic.   Ears:   Right Ear: Tympanic membrane normal.   Left Ear: Tympanic membrane normal.   Nose: Mucosal edema present.   Mouth/Throat: Uvula is midline, oropharynx is clear and moist and mucous membranes are normal. Cobblestoning present. Tonsils are 1+ on the right. Tonsils are 1+ on the left. No tonsillar exudate.   Frequent clearing of throat      Comments: Frequent clearing of throat  Eyes: Lids are normal.   Neck: Neck supple.   Cardiovascular: Normal rate.   Pulmonary/Chest: Effort normal.   Neurological: He is alert, oriented to person, place, and time and easily aroused. GCS eye subscore is 4. GCS verbal subscore is 5. GCS motor subscore is 6.   Skin: Skin is warm, dry, intact and not diaphoretic. Capillary refill takes less than 2 seconds.   Psychiatric: His speech is normal and behavior is normal.   Nursing note and vitals " reviewed.        Assessment:       1. PND (post-nasal drip)    2. Flu-like symptoms    3. PND (paroxysmal nocturnal dyspnea)          Plan:     Encouraged patient to avoid any known triggers or irritants, ensure he remains hydrated, follow up with PCP if symptoms does not improve prescriptive treatment.  All POC testing is negative today in clinic.  Patient declined strep screening.    PND (post-nasal drip)  -     levocetirizine (XYZAL) 5 MG tablet; Take 1 tablet (5 mg total) by mouth every evening.  Dispense: 30 tablet; Refill: 0  -     fluticasone propionate (FLONASE) 50 mcg/actuation nasal spray; 1 spray (50 mcg total) by Each Nostril route daily as needed for Rhinitis.  Dispense: 9.9 mL; Refill: 0    Flu-like symptoms  -     SARS Coronavirus 2 Antigen, POCT Manual Read  -     POCT Influenza A/B MOLECULAR    PND (paroxysmal nocturnal dyspnea)           Results for orders placed or performed in visit on 12/30/24   POCT Influenza A/B MOLECULAR    Collection Time: 12/30/24  7:09 PM   Result Value Ref Range    POC Molecular Influenza A Ag Negative Negative    POC Molecular Influenza B Ag Negative Negative     Acceptable Yes    SARS Coronavirus 2 Antigen, POCT Manual Read    Collection Time: 12/30/24  7:10 PM   Result Value Ref Range    SARS Coronavirus 2 Antigen Negative Negative     Acceptable Yes

## 2024-12-31 NOTE — PATIENT INSTRUCTIONS
Post-nasal drip is a condition where mucus drips down the back of your throat from your nose or sinuses. It can be caused by a number of things, including:   Allergies  Avoid the things that trigger your allergies, and take antihistamines, decongestants, or nasal sprays.   Sinus infections  A cold that lasts more than 10 days could be a bacterial sinus infection, which may require antibiotics.   Gastroesophageal reflux (GERD)  Also known as LPR, this condition occurs when stomach contents leak back up into the throat and nose. To treat GERD, you can try elevating the head of your bed, avoiding certain foods and beverages, and taking antacids.   Other conditions  Polyps in the nose, or a nonallergic condition called vasomotor rhinitis, can also cause post-nasal drip.   Post-nasal drip is usually harmless and not contagious, but it can be bothersome. Symptoms include: A sore throat, A cough, Frequent throat clearing, and Hoarseness.   You should see a doctor if you have post-nasal drip and: Discolored mucus that doesn't go away, Foul-smelling mucus, A fever, Symptoms that last more than 10 days, and Blood in your mucus.   To relieve symptoms, you can try staying hydrated and avoiding irritants like cigarette smoke.

## 2025-01-09 ENCOUNTER — OFFICE VISIT (OUTPATIENT)
Dept: INTERNAL MEDICINE | Facility: CLINIC | Age: 60
End: 2025-01-09
Payer: COMMERCIAL

## 2025-01-09 VITALS
HEART RATE: 71 BPM | WEIGHT: 250 LBS | HEIGHT: 68 IN | BODY MASS INDEX: 37.89 KG/M2 | SYSTOLIC BLOOD PRESSURE: 115 MMHG | OXYGEN SATURATION: 100 % | TEMPERATURE: 98 F | DIASTOLIC BLOOD PRESSURE: 76 MMHG | RESPIRATION RATE: 16 BRPM

## 2025-01-09 DIAGNOSIS — N52.9 ERECTILE DYSFUNCTION, UNSPECIFIED ERECTILE DYSFUNCTION TYPE: ICD-10-CM

## 2025-01-09 DIAGNOSIS — E08.00 DIABETES MELLITUS DUE TO UNDERLYING CONDITION WITH HYPEROSMOLARITY WITHOUT COMA, WITHOUT LONG-TERM CURRENT USE OF INSULIN: Primary | ICD-10-CM

## 2025-01-09 LAB — HBA1C MFR BLD: 5.6 %

## 2025-01-09 PROCEDURE — 99215 OFFICE O/P EST HI 40 MIN: CPT | Mod: PBBFAC | Performed by: INTERNAL MEDICINE

## 2025-01-09 PROCEDURE — 83036 HEMOGLOBIN GLYCOSYLATED A1C: CPT | Mod: PBBFAC | Performed by: INTERNAL MEDICINE

## 2025-01-09 RX ORDER — SILDENAFIL 50 MG/1
50 TABLET, FILM COATED ORAL DAILY PRN
Qty: 10 TABLET | Refills: 2 | Status: SHIPPED | OUTPATIENT
Start: 2025-01-09 | End: 2025-04-09

## 2025-01-09 NOTE — PROGRESS NOTES
[facility]  [address] [facility] [address]      PATIENT NAME: Chadwick Lorenzo  : 1965  DATE: 25  MRN: [id number]      Billing Provider: Clyde Hyatt MD  Level of Service:   Patient PCP Information       Provider PCP Type    Clyde Hyatt MD General            Reason for Visit / Chief Complaint: Follow-up       History of Present Illness / Problem Focused Workflow     Chadwick Lorenzo presents to the clinic with Follow-up     Patient was last seen by me on . He has a history of heart failure, diabetes, kidney stones, low testosterone, migraines morbid obesity.    Today, he has no major concerns. Recovering from a cold.    PMH: HFrEF/NICMO with recovered EF 60% per echo in 2023, DM, kidney stones, low testosterone, migraines, morbid obesity, vit d def, hld       Review of Systems     10-point ROS performed and negative except as above.      Medical / Social / Family History     Past Medical History:   Diagnosis Date    CHF (congestive heart failure)     Diabetes mellitus, type 2     Hyperlipidemia     IGT (impaired glucose tolerance)     Kidney stones     Low testosterone in male     Migraine     Morbid obesity     Vitamin D deficiency        Past Surgical History:   Procedure Laterality Date    ANGIOGRAM, CORONARY, WITH LEFT HEART CATHETERIZATION N/A 2022    Procedure: Angiogram, Coronary, with Left Heart Cath;  Surgeon: Lev Manuel MD;  Location: [facility] CATH LAB;  Service: Cardiology;  Laterality: N/A;    kidney stones removal      rotator cuff surgery         Social History    reports that he has never smoked. He has never used smokeless tobacco. He reports that he does not drink alcohol and does not use drugs.    Family History  's family history includes Cancer in his mother; Heart disease in his father; No Known Problems in his brother and sister.    Medications and Allergies      Medications  Outpatient Medications Marked as Taking for the 1/9/25 encounter (Office Visit) with Clyde Hyatt MD   Medication Sig Dispense Refill    albuterol (PROVENTIL/VENTOLIN HFA) 90 mcg/actuation inhaler Inhale 2 puffs into the lungs daily as needed for Shortness of Breath. 18 g 0    aspirin (ECOTRIN) 81 MG EC tablet Take 1 tablet (81 mg total) by mouth once daily. 90 tablet 3    blood-glucose meter Misc 1 Device by Misc.(Non-Drug; Combo Route) route 3 (three) times daily before meals. 1 each 0    empagliflozin (JARDIANCE) 10 mg tablet Take 1 tablet (10 mg total) by mouth once daily. 90 tablet 3    fluticasone propionate (FLONASE) 50 mcg/actuation nasal spray 1 spray (50 mcg total) by Each Nostril route daily as needed for Rhinitis. 9.9 mL 0    metFORMIN (GLUCOPHAGE) 500 MG tablet Take 1 tablet (500 mg total) by mouth 2 (two) times daily with meals. 60 tablet 3    metoprolol succinate (TOPROL-XL) 25 MG 24 hr tablet Take 1 tablet (25 mg total) by mouth once daily. 90 tablet 3    MOUNJARO 15 mg/0.5 mL PnIj Inject 15 mg into the skin once a week. for 24 doses 2 mL 5    NURTEC 75 mg odt Take 1 tablet (75 mg total) by mouth daily as needed for Migraine. 8 tablet 1    sacubitriL-valsartan (ENTRESTO) 24-26 mg per tablet Take 1 tablet by mouth 2 (two) times daily. 180 tablet 3     Current Facility-Administered Medications for the 1/9/25 encounter (Office Visit) with Clyde Hyatt MD   Medication Dose Route Frequency Provider Last Rate Last Admin    sodium chloride 0.9% flush 10 mL  10 mL Intravenous PRN Lev Manuel MD           Allergies  Review of patient's allergies indicates:  No Known Allergies    Physical Examination     Vitals:    01/09/25 0908   BP: 115/76   Pulse: 71   Resp: 16   Temp: 97.7 °F (36.5 °C)     General: AAOx3, NAD, alert and cooperative  HEENT: EOMI, normal conjunctiva  Neck: no LAD, no JVD, supple, no masses or thyromegaly  CVS: S1/S2 nml, RRR, no murmurs, rubs or  gallops,  Resp: CTA B/L, no rhonchi, rales, or wheezing  GI: no TTP, not distended, BS+, obese  Skin: not jaundiced, warm, no rashes  Musculoskeletal: normal ROM, no joint tenderness, normal muscular development  Protective Sensation (w/ 10 gram monofilament):  Right: Intact  Left: Intact    Visual Inspection:  Normal -  Bilateral    Pedal Pulses:   Right: Present  Left: Present    Posterior Tibialis Pulses:   Right:Present  Left: Present    Extremities: no peripheral edema, peripheral pulses intact  Neuro: CN II-XII grossly intact, strength and sensation symmetric and intact throughout, no focal neurological deficits        Results   Reviewed recent labs.        Assessment and Plan (including Health Maintenance)     Plan:   Chadwick was seen today for follow-up.    Diagnoses and all orders for this visit:    Diabetes mellitus due to underlying condition with hyperosmolarity without coma, without long-term current use of insulin  -     Ambulatory referral/consult to Ophthalmology; Future  -     POCT HEMOGLOBIN A1C  -     CBC Auto Differential; Future  -     Comprehensive Metabolic Panel; Future  -     Lipid Panel; Future  -     TSH; Future  -     Vitamin D; Future  -     Urinalysis, Reflex to Urine Culture; Future  -     Microalbumin/Creatinine Ratio, Urine; Future    Erectile dysfunction, unspecified erectile dysfunction type  -     sildenafiL (VIAGRA) 50 MG tablet; Take 1 tablet (50 mg total) by mouth daily as needed for Erectile Dysfunction.  -     CBC Auto Differential; Future  -     Comprehensive Metabolic Panel; Future  -     Lipid Panel; Future  -     TSH; Future  -     Vitamin D; Future  -     Urinalysis, Reflex to Urine Culture; Future  -     Microalbumin/Creatinine Ratio, Urine; Future        NICMO/HFeEF recovered NYHA Class 1  Follows with Cards  Echo on 1/13/23 showed EF of 60%.   Will re-order echo since it will be 1 year.  Continue with entresto 24/26 mg bid, metoprolol 25 mg qd, jardiance  Not on  lasix. Does not appear fluid overloaded. euvolemic     CAD  LHC on 8/1/22. Showed moderate CAD  Continue Aspirin, atorvastatin, metoprolol   On entresto     HLD  LDL was 67 on 10/9/23  HDL 37  Continue with atorvastatin  Recommend omega fatty acid (fish oil)     Diabetes Mellitus  POC today  A1c 5.6  Continue metformin 500 mg bid, jardiance  Also on mounjaro  On an ARB (entresto)  Will order urine protein/cr  Foot exam today   Referred to eye doctor     ED  On sildenafil     Vit D Def  Vit D level is 21  Recommend 1000 IU daily     Testosterone Def  LH/FSH wnl  Testosterone levels wnl     Obesity  BMI 38  Continue mounjaro  States clothes feeling loser.  Continue to exercise and diet.        Immunizations and Health maintenance  Flu shot received  Pneumovax on 10/23  No need for AAA screening (never smoker)  No need for CT low dose (never smoker)  PSA screen done - wnl  Colonoscopy - [facility]ocpy [facility] at [facility] 2/2023. Records reviewed. Benign. Recommended to repeat in 5 years (2/2028)  Recommend exercise - start with 20 minute walks daily.   refusing shingles vaccine.     F/u in 6 months.    Health Maintenance Due   Topic Date Due    High Dose Statin  Never done    Shingles Vaccine (1 of 2) Never done    Eye Exam  07/28/2023    Diabetes Urine Screening  09/20/2023    COVID-19 Vaccine (4 - 2024-25 season) 09/01/2024    Foot Exam  10/11/2024    Hemoglobin A1c  01/10/2025         Health Maintenance Topics with due status: Not Due       Topic Last Completion Date    TETANUS VACCINE 06/05/2020    Colorectal Cancer Screening 02/27/2023    Lipid Panel 07/10/2024    RSV Vaccine (Age 60+ and Pregnant patients) Not Due       No follow-ups on file.     Future Appointments   Date Time Provider Department Center   1/9/2025  2:30 PM Clyde Hyatt MD [facility] IM RES [place]   1/17/2025  9:45 AM Paulina Banuelos FNP [facility] CARD [place]            Signature:  Clyde Hyatt MD  [facility]  [address] [facility] [address]    Date of encounter: 1/9/25

## 2025-01-16 NOTE — PROGRESS NOTES
CHIEF COMPLAINT:   Chief Complaint   Patient presents with    Follow-up     Denies any cardiac problems                     Review of patient's allergies indicates:  No Known Allergies                                       HPI:  Chadwick Lorenzo 59 y.o. male with NICMO with recovered EF 60% per ECHO Jan. 2023, Nonobstructive CAD per [facility] 8.1.22, HLD,  DM II, who presents to cardiology clinic for routine follow-up and results of testing. Carotid US completed on 7.26.24 demonstrated no hemodynamically significant stenosis to bilateral internal carotid arteries.  Latest Echocardiogram obtained on 1.13.23 revealed an ejection fraction of 60%, which is recovered from previous EF of 25% per ECHO June 2022.  Ischemic wise, the patient underwent a Left Heart Catheterization on 8.1.22 that revealed moderate CAD, 60% to 1st Mrg. (See reports below).    Patient presents to clinic today reporting that he feels good and denies any cardiovascular complaints.  He appears euvolemic on exam with no overt signs or symptoms of volume overload.  The patient is active and is able to do his routine ADLs and job duties, which requires a lot of ambulation, without experiencing any angina or angina equivalent symptoms.  He also continues to routinely [employer] without any issues.  He remains compliant with his medications and is tolerating without issue.    CARDIAC TESTING:  Carotid US 7.26.24  The right internal carotid artery demonstrated no hemodynamically significant stenosis.  The left internal carotid artery demonstrated no hemodynamically significant stenosis.  There was no substantial plaque identified on this scan.  The bilateral vertebral arteries were patent with antegrade flow.    ECHO 1.13.23  The left ventricle is normal in size with concentric remodeling and normal systolic function.  The estimated ejection fraction is 60%.  Normal left ventricular diastolic function.  Normal right ventricular size with normal right  ventricular systolic function.  Mild aortic regurgitation.  Normal central venous pressure (3 mmHg).       Echo 6.22.22    Interpretation Summary  · Concentric hypertrophy and severely decreased systolic function.  · The estimated ejection fraction is 25%.  · Grade I left ventricular diastolic dysfunction.  · Mild aortic regurgitation.  · Normal right ventricular size with normal right ventricular systolic function.  · Intermediate central venous pressure (8 mmHg).  · The estimated PA systolic pressure is 16 mmHg.  · There is severe left ventricular global hypokinesis.    Cardiac Catheterization 8.1.22    Conclusion  · The pre-procedure left ventricular end diastolic pressure was 7.  · The 1st Mrg lesion was 60% stenosed.  · The estimated blood loss was none.  · There was non-obstructive coronary artery disease..    FINDINGS  The patient has Moderate CAD  Blood loss:  less than 10 cc.    RECOMMENDATIONS  Medical management  Maximize GDMT for patient's cardiomyopathy. Avoid cardiac toxins.  Risk factor modifications  Activity -- avoid straining with affected limb for one week  Exercise on regular basis.    EKG done 6/23  Normal sinus rhythm, rate 60. Normal ECG       Past Medical History:   has a past medical history of CHF (congestive heart failure), Diabetes mellitus, type 2, IGT (impaired glucose tolerance), Kidney stones, Low testosterone in male, Migraine, Morbid obesity, and Vitamin D deficiency. HLD    Past Surgical History:   has a past surgical history that includes rotator cuff surgery and kidney stones removal.     Family History:  family history includes Cancer in his mother; Heart disease in his father.     Social History:   reports that he has never smoked. He has never used smokeless tobacco. He reports previous alcohol use. He reports that he does not use drugs.                                                                                                                                                                                                                                                                                          Patient Active Problem List   Diagnosis    Nocturia    Dizziness    Fatigue    IGT (impaired glucose tolerance)    Asthma    Low testosterone    Mixed diabetic hyperlipidemia associated with type 2 diabetes mellitus    Type 2 diabetes mellitus with hyperglycemia, without long-term current use of insulin    Nonischemic cardiomyopathy    BMI 38.0-38.9,adult    Screen for colon cancer    FH: colon cancer in relative diagnosed at >50 years old    Hx of non anemic vitamin B12 deficiency    Immunization due    Thunderclap headache    Headache    Migraine with aura    Mild intermittent asthma    Class 2 severe obesity due to excess calories with serious comorbidity and body mass index (BMI) of 38.0 to 38.9 in adult    Seasonal allergies    Severe acute respiratory syndrome coronavirus 2 (SARS-CoV-2) vaccination not indicated    Primary hypertension    Mixed hyperlipidemia    Vitamin D deficiency    Hypertension associated with type 2 diabetes mellitus    Epigastric abdominal pain     Past Surgical History:   Procedure Laterality Date    ANGIOGRAM, CORONARY, WITH LEFT HEART CATHETERIZATION N/A 08/01/2022    Procedure: Angiogram, Coronary, with Left Heart Cath;  Surgeon: Lev Manuel MD;  Location: [facility] CATH LAB;  Service: Cardiology;  Laterality: N/A;    kidney stones removal      rotator cuff surgery       Social History     Socioeconomic History    Marital status:     Number of children: 2   Occupational History    Occupation: medical assistant    Occupation: Medical Assistant     Employer: [employer]     Comment: [employer]   Tobacco Use    Smoking status: Never    Smokeless tobacco: Never   Substance and Sexual Activity    Alcohol use: Never    Drug use: Never    Sexual activity: Yes     Partners: Female     Birth control/protection: None     Social Drivers of Health     Financial  Resource Strain: Low Risk  (10/11/2023)    Overall Financial Resource Strain (CARDIA)     Difficulty of Paying Living Expenses: Not very hard   Transportation Needs: No Transportation Needs (10/11/2023)    PRAPARE - Transportation     Lack of Transportation (Medical): No     Lack of Transportation (Non-Medical): No   Physical Activity: Unknown (10/11/2023)    Exercise Vital Sign     Minutes of Exercise per Session: 20 min   Stress: No Stress Concern Present (10/11/2023)    [employer] [place] [employer] - Occupational Stress Questionnaire     Feeling of Stress : Only a little   Housing Stability: Low Risk  (10/11/2023)    Housing Stability Vital Sign     Unable to Pay for Housing in the Last Year: No     Number of Places Lived in the Last Year: 1     Unstable Housing in the Last Year: No        Family History   Problem Relation Name Age of Onset    Cancer Mother      Heart disease Father      No Known Problems Sister      No Known Problems Brother           Current Outpatient Medications:     albuterol (PROVENTIL/VENTOLIN HFA) 90 mcg/actuation inhaler, Inhale 2 puffs into the lungs daily as needed for Shortness of Breath., Disp: 18 g, Rfl: 0    aspirin (ECOTRIN) 81 MG EC tablet, Take 1 tablet (81 mg total) by mouth once daily., Disp: 90 tablet, Rfl: 3    atorvastatin (LIPITOR) 40 MG tablet, Take 1 tablet (40 mg total) by mouth once daily., Disp: 90 tablet, Rfl: 3    blood-glucose meter Misc, 1 Device by Misc.(Non-Drug; Combo Route) route 3 (three) times daily before meals., Disp: 1 each, Rfl: 0    empagliflozin (JARDIANCE) 10 mg tablet, Take 1 tablet (10 mg total) by mouth once daily., Disp: 90 tablet, Rfl: 3    fluticasone propionate (FLONASE) 50 mcg/actuation nasal spray, 1 spray (50 mcg total) by Each Nostril route daily as needed for Rhinitis., Disp: 9.9 mL, Rfl: 0    metFORMIN (GLUCOPHAGE) 500 MG tablet, Take 1 tablet (500 mg total) by mouth 2 (two) times daily with meals., Disp: 60 tablet, Rfl: 3     "metoprolol succinate (TOPROL-XL) 25 MG 24 hr tablet, Take 1 tablet (25 mg total) by mouth once daily., Disp: 90 tablet, Rfl: 3    MOUNJARO 15 mg/0.5 mL PnIj, Inject 15 mg into the skin once a week. for 24 doses, Disp: 2 mL, Rfl: 5    NURTEC 75 mg odt, Take 1 tablet (75 mg total) by mouth daily as needed for Migraine., Disp: 8 tablet, Rfl: 1    sacubitriL-valsartan (ENTRESTO) 24-26 mg per tablet, Take 1 tablet by mouth 2 (two) times daily., Disp: 180 tablet, Rfl: 3    sildenafiL (VIAGRA) 50 MG tablet, Take 1 tablet (50 mg total) by mouth daily as needed for Erectile Dysfunction., Disp: 10 tablet, Rfl: 2    baclofen (LIORESAL) 10 MG tablet, Take 1 tablet (10 mg total) by mouth 3 (three) times daily as needed (neck stiffness). (Patient not taking: Reported on 7/12/2024), Disp: 9 tablet, Rfl: 0    BD ULTRA-FINE MINI PEN NEEDLE 31 gauge x 3/16" Ndle, SMARTSIG:Pre-Filled Pen Syringe SUB-Q As Directed (Patient not taking: Reported on 1/9/2025), Disp: , Rfl:     ergocalciferol, vitamin D2, (VITAMIN D ORAL), Take 2,000 Units by mouth. (Patient not taking: Reported on 1/9/2025), Disp: , Rfl:     lancets Misc, 1 lancet by Misc.(Non-Drug; Combo Route) route 3 (three) times daily before meals. (Patient not taking: Reported on 1/9/2025), Disp: 100 each, Rfl: 11    lancing device Misc, 1 Device by Misc.(Non-Drug; Combo Route) route 3 (three) times daily before meals. (Patient not taking: Reported on 1/9/2025), Disp: 1 each, Rfl: 0    levocetirizine (XYZAL) 5 MG tablet, Take 1 tablet (5 mg total) by mouth every evening. (Patient not taking: Reported on 1/9/2025), Disp: 30 tablet, Rfl: 0    meclizine (ANTIVERT) 25 mg tablet, Take 1 tablet (25 mg total) by mouth 3 (three) times daily as needed for Dizziness. (Patient not taking: Reported on 1/9/2025), Disp: 14 tablet, Rfl: 0    ondansetron (ZOFRAN-ODT) 4 MG TbDL, Take 1 tablet (4 mg total) by mouth every 6 (six) hours as needed (nausea/vomiting). (Patient not taking: Reported on " "1/9/2025), Disp: 14 tablet, Rfl: 0    pen needle, diabetic 33 gauge x 5/32" Ndle, 365 Units by Misc.(Non-Drug; Combo Route) route once daily. (Patient not taking: Reported on 1/9/2025), Disp: 365 each, Rfl: 0    TECHLITE PEN NEEDLE 32 gauge x 5/32" Ndle, USE 1 DAILY. 100 DAY SUPPLY (Patient not taking: Reported on 1/9/2025), Disp: , Rfl:     testosterone cypionate (DEPOTESTOTERONE CYPIONATE) 100 mg/mL injection, Inject 1 mL (100 mg total) into the muscle every 14 (fourteen) days. for 10 doses (Patient not taking: Reported on 10/10/2023), Disp: 10 mL, Rfl: 0    Current Facility-Administered Medications:     sodium chloride 0.9% flush 10 mL, 10 mL, Intravenous, PRN, Lev Manuel MD     ROS:                                                                                                                                                                             Review of Systems   Constitutional: Negative.    HENT: Negative.     Eyes: Negative.    Respiratory: Negative.  Negative for shortness of breath.    Cardiovascular: Negative.    Gastrointestinal: Negative.    Genitourinary: Negative.    Musculoskeletal: Negative.    Skin: Negative.    Neurological: Negative.    Endo/Heme/Allergies: Negative.    Psychiatric/Behavioral: Negative.          Blood pressure 107/74, pulse 76, temperature 97.6 °F (36.4 °C), temperature source Oral, resp. rate 18, height 5' 8" (1.727 m), weight 112 kg (247 lb), SpO2 98%.   PE:  Physical Exam  Constitutional:       Appearance: Normal appearance.   HENT:      Head: Normocephalic.   Eyes:      Pupils: Pupils are equal, round, and reactive to light.   Cardiovascular:      Rate and Rhythm: Normal rate and regular rhythm.      Pulses: Normal pulses.   Pulmonary:      Effort: Pulmonary effort is normal.   Musculoskeletal:         General: Normal range of motion.   Skin:     General: Skin is warm and dry.   Neurological:      General: No focal deficit present.      Mental Status: He is " alert and oriented to person, place, and time.   Psychiatric:         Mood and Affect: Mood normal.         Behavior: Behavior normal.                ASSESSMENT/PLAN:  NICMO (recovered EF) NYHA Class I  - LVEF- 60% per ECHO 1.13.23 recovered -->LVEF- 25% per ECHO June 2022  - [facility] - moderate CAD (60% 1st Mrg lesion ) (8.1.22)  - Denies SOB, RAYO   - Euvolemic upon exam, warm and dry  - Continue GDMT: Entresto 24/26 mg BID, Metoprolol succinate 25 mg qd.    - Counseled patient on low-salt diet and s/s of volume overload     Nonobstructive  CAD  - [facility] - moderate CAD (60% 1st Mrg lesion ) (8.1.22)  - Denies any cardiac complaints   - Continue ASA, Atorvastatin 40 mg, metoprolol succinate 25 mg   - Instructed patient to continue with lifestyle modifications, including heart healthy, low-cholesterol diet and exercise as tolerated     HLD  - LDL -68- at goal   - Continue Atorvastatin 40 mg  - Counseled on heart healthy, low-cholesterol diet activity as tolerated    DM II  - A1C- 5.6  - Continue management per PCP     Obesity  - Counseled on weight loss measures through diet and increased exercise as tolerated      Follow-up in Cardiology Clinic in 6 months or sooner if needed  Follow up with PCP as directed

## 2025-01-17 ENCOUNTER — OFFICE VISIT (OUTPATIENT)
Dept: CARDIOLOGY | Facility: CLINIC | Age: 60
End: 2025-01-17
Payer: COMMERCIAL

## 2025-01-17 VITALS
RESPIRATION RATE: 18 BRPM | HEIGHT: 68 IN | SYSTOLIC BLOOD PRESSURE: 107 MMHG | BODY MASS INDEX: 37.44 KG/M2 | WEIGHT: 247 LBS | DIASTOLIC BLOOD PRESSURE: 74 MMHG | OXYGEN SATURATION: 98 % | TEMPERATURE: 98 F | HEART RATE: 76 BPM

## 2025-01-17 DIAGNOSIS — I10 PRIMARY HYPERTENSION: Chronic | ICD-10-CM

## 2025-01-17 DIAGNOSIS — E11.65 TYPE 2 DIABETES MELLITUS WITH HYPERGLYCEMIA, WITH LONG-TERM CURRENT USE OF INSULIN: ICD-10-CM

## 2025-01-17 DIAGNOSIS — I25.10 CORONARY ARTERY DISEASE INVOLVING NATIVE CORONARY ARTERY OF NATIVE HEART WITHOUT ANGINA PECTORIS: Primary | ICD-10-CM

## 2025-01-17 DIAGNOSIS — E78.2 MIXED DIABETIC HYPERLIPIDEMIA ASSOCIATED WITH TYPE 2 DIABETES MELLITUS: ICD-10-CM

## 2025-01-17 DIAGNOSIS — E11.69 MIXED DIABETIC HYPERLIPIDEMIA ASSOCIATED WITH TYPE 2 DIABETES MELLITUS: ICD-10-CM

## 2025-01-17 DIAGNOSIS — Z79.4 TYPE 2 DIABETES MELLITUS WITH HYPERGLYCEMIA, WITH LONG-TERM CURRENT USE OF INSULIN: ICD-10-CM

## 2025-01-17 DIAGNOSIS — I42.8 NONISCHEMIC CARDIOMYOPATHY: ICD-10-CM

## 2025-01-17 DIAGNOSIS — E78.2 MIXED HYPERLIPIDEMIA: Chronic | ICD-10-CM

## 2025-01-17 PROBLEM — R42 DIZZINESS: Status: RESOLVED | Noted: 2022-06-22 | Resolved: 2025-01-17

## 2025-01-17 PROBLEM — R07.9 CHEST PAIN: Status: RESOLVED | Noted: 2022-06-22 | Resolved: 2025-01-17

## 2025-01-17 PROBLEM — I20.89 ANGINA OF EFFORT: Status: RESOLVED | Noted: 2022-07-12 | Resolved: 2025-01-17

## 2025-01-17 PROCEDURE — 3074F SYST BP LT 130 MM HG: CPT | Mod: CPTII,,, | Performed by: NURSE PRACTITIONER

## 2025-01-17 PROCEDURE — 3044F HG A1C LEVEL LT 7.0%: CPT | Mod: CPTII,,, | Performed by: NURSE PRACTITIONER

## 2025-01-17 PROCEDURE — 99214 OFFICE O/P EST MOD 30 MIN: CPT | Mod: S$PBB,,, | Performed by: NURSE PRACTITIONER

## 2025-01-17 PROCEDURE — 99215 OFFICE O/P EST HI 40 MIN: CPT | Mod: PBBFAC | Performed by: NURSE PRACTITIONER

## 2025-01-17 PROCEDURE — 1160F RVW MEDS BY RX/DR IN RCRD: CPT | Mod: CPTII,,, | Performed by: NURSE PRACTITIONER

## 2025-01-17 PROCEDURE — 3078F DIAST BP <80 MM HG: CPT | Mod: CPTII,,, | Performed by: NURSE PRACTITIONER

## 2025-01-17 PROCEDURE — 3008F BODY MASS INDEX DOCD: CPT | Mod: CPTII,,, | Performed by: NURSE PRACTITIONER

## 2025-01-17 PROCEDURE — 1159F MED LIST DOCD IN RCRD: CPT | Mod: CPTII,,, | Performed by: NURSE PRACTITIONER

## 2025-01-17 RX ORDER — ATORVASTATIN CALCIUM 40 MG/1
40 TABLET, FILM COATED ORAL DAILY
Qty: 90 TABLET | Refills: 3 | Status: SHIPPED | OUTPATIENT
Start: 2025-01-17 | End: 2026-01-17

## 2025-01-22 ENCOUNTER — HOSPITAL ENCOUNTER (EMERGENCY)
Facility: HOSPITAL | Age: 60
Discharge: HOME | End: 2025-01-22
Attending: FAMILY MEDICINE
Payer: COMMERCIAL

## 2025-01-22 VITALS
HEART RATE: 53 BPM | BODY MASS INDEX: 37.56 KG/M2 | HEIGHT: 68 IN | DIASTOLIC BLOOD PRESSURE: 77 MMHG | RESPIRATION RATE: 18 BRPM | OXYGEN SATURATION: 95 % | TEMPERATURE: 98 F | SYSTOLIC BLOOD PRESSURE: 126 MMHG

## 2025-01-22 DIAGNOSIS — K21.9 GASTROESOPHAGEAL REFLUX DISEASE WITHOUT ESOPHAGITIS: Primary | ICD-10-CM

## 2025-01-22 DIAGNOSIS — R10.9 ABDOMINAL PAIN: ICD-10-CM

## 2025-01-22 LAB
ALBUMIN SERPL-MCNC: 3.8 G/DL (ref 3.5–5)
ALBUMIN/GLOB SERPL: 1.1 RATIO (ref 1.1–2)
ALP SERPL-CCNC: 76 UNIT/L (ref 40–150)
ALT SERPL-CCNC: 19 UNIT/L (ref 0–55)
ANION GAP SERPL CALC-SCNC: 10 MEQ/L
AST SERPL-CCNC: 32 UNIT/L (ref 5–34)
BACTERIA #/AREA URNS AUTO: ABNORMAL /HPF
BASOPHILS # BLD AUTO: 0.03 X10(3)/MCL
BASOPHILS NFR BLD AUTO: 0.6 %
BILIRUB SERPL-MCNC: 0.4 MG/DL
BILIRUB UR QL STRIP.AUTO: NEGATIVE
BUN SERPL-MCNC: 10.3 MG/DL (ref 8.4–25.7)
CALCIUM SERPL-MCNC: 9.9 MG/DL (ref 8.4–10.2)
CHLORIDE SERPL-SCNC: 109 MMOL/L (ref 98–107)
CLARITY UR: CLEAR
CO2 SERPL-SCNC: 22 MMOL/L (ref 22–29)
COLOR UR AUTO: ABNORMAL
CREAT SERPL-MCNC: 0.81 MG/DL (ref 0.72–1.25)
CREAT/UREA NIT SERPL: 13
EOSINOPHIL # BLD AUTO: 0.12 X10(3)/MCL (ref 0–0.9)
EOSINOPHIL NFR BLD AUTO: 2.3 %
ERYTHROCYTE [DISTWIDTH] IN BLOOD BY AUTOMATED COUNT: 13.4 % (ref 11.5–17)
GFR SERPLBLD CREATININE-BSD FMLA CKD-EPI: >60 ML/MIN/1.73/M2
GLOBULIN SER-MCNC: 3.4 GM/DL (ref 2.4–3.5)
GLUCOSE SERPL-MCNC: 131 MG/DL (ref 74–100)
GLUCOSE UR QL STRIP: ABNORMAL
HCT VFR BLD AUTO: 39.7 % (ref 42–52)
HGB BLD-MCNC: 12.9 G/DL (ref 14–18)
HGB UR QL STRIP: NEGATIVE
HYALINE CASTS #/AREA URNS LPF: ABNORMAL /LPF
IMM GRANULOCYTES # BLD AUTO: 0.01 X10(3)/MCL (ref 0–0.04)
IMM GRANULOCYTES NFR BLD AUTO: 0.2 %
KETONES UR QL STRIP: NEGATIVE
LEUKOCYTE ESTERASE UR QL STRIP: NEGATIVE
LIPASE SERPL-CCNC: 15 U/L
LYMPHOCYTES # BLD AUTO: 1.79 X10(3)/MCL (ref 0.6–4.6)
LYMPHOCYTES NFR BLD AUTO: 33.6 %
MCH RBC QN AUTO: 28.6 PG (ref 27–31)
MCHC RBC AUTO-ENTMCNC: 32.5 G/DL (ref 33–36)
MCV RBC AUTO: 88 FL (ref 80–94)
MONOCYTES # BLD AUTO: 0.34 X10(3)/MCL (ref 0.1–1.3)
MONOCYTES NFR BLD AUTO: 6.4 %
MUCOUS THREADS URNS QL MICRO: ABNORMAL /LPF
NEUTROPHILS # BLD AUTO: 3.04 X10(3)/MCL (ref 2.1–9.2)
NEUTROPHILS NFR BLD AUTO: 56.9 %
NITRITE UR QL STRIP: NEGATIVE
NRBC BLD AUTO-RTO: 0 %
PH UR STRIP: 8.5 [PH]
PLATELET # BLD AUTO: 342 X10(3)/MCL (ref 130–400)
PMV BLD AUTO: 10 FL (ref 7.4–10.4)
POTASSIUM SERPL-SCNC: 4.1 MMOL/L (ref 3.5–5.1)
PROT SERPL-MCNC: 7.2 GM/DL (ref 6.4–8.3)
PROT UR QL STRIP: ABNORMAL
RBC # BLD AUTO: 4.51 X10(6)/MCL (ref 4.7–6.1)
RBC #/AREA URNS AUTO: ABNORMAL /HPF
SODIUM SERPL-SCNC: 141 MMOL/L (ref 136–145)
SP GR UR STRIP.AUTO: 1.02 (ref 1–1.03)
SQUAMOUS #/AREA URNS LPF: ABNORMAL /HPF
TROPONIN I SERPL-MCNC: <0.01 NG/ML (ref 0–0.04)
UROBILINOGEN UR STRIP-ACNC: NORMAL
WBC # BLD AUTO: 5.33 X10(3)/MCL (ref 4.5–11.5)
WBC #/AREA URNS AUTO: ABNORMAL /HPF

## 2025-01-22 PROCEDURE — 93005 ELECTROCARDIOGRAM TRACING: CPT

## 2025-01-22 PROCEDURE — 85025 COMPLETE CBC W/AUTO DIFF WBC: CPT | Performed by: FAMILY MEDICINE

## 2025-01-22 PROCEDURE — 25000003 PHARM REV CODE 250: Performed by: FAMILY MEDICINE

## 2025-01-22 PROCEDURE — 96374 THER/PROPH/DIAG INJ IV PUSH: CPT

## 2025-01-22 PROCEDURE — 83690 ASSAY OF LIPASE: CPT | Performed by: FAMILY MEDICINE

## 2025-01-22 PROCEDURE — 80053 COMPREHEN METABOLIC PANEL: CPT | Performed by: FAMILY MEDICINE

## 2025-01-22 PROCEDURE — 96375 TX/PRO/DX INJ NEW DRUG ADDON: CPT

## 2025-01-22 PROCEDURE — 81001 URINALYSIS AUTO W/SCOPE: CPT | Performed by: FAMILY MEDICINE

## 2025-01-22 PROCEDURE — 63600175 PHARM REV CODE 636 W HCPCS: Performed by: FAMILY MEDICINE

## 2025-01-22 PROCEDURE — 99284 EMERGENCY DEPT VISIT MOD MDM: CPT | Mod: 25

## 2025-01-22 PROCEDURE — 84484 ASSAY OF TROPONIN QUANT: CPT | Performed by: FAMILY MEDICINE

## 2025-01-22 RX ORDER — ONDANSETRON HYDROCHLORIDE 2 MG/ML
4 INJECTION, SOLUTION INTRAVENOUS
Status: COMPLETED | OUTPATIENT
Start: 2025-01-22 | End: 2025-01-22

## 2025-01-22 RX ORDER — MORPHINE SULFATE 2 MG/ML
4 INJECTION, SOLUTION INTRAMUSCULAR; INTRAVENOUS
Status: DISCONTINUED | OUTPATIENT
Start: 2025-01-22 | End: 2025-01-22

## 2025-01-22 RX ORDER — ALUMINUM HYDROXIDE, MAGNESIUM HYDROXIDE, AND SIMETHICONE 1200; 120; 1200 MG/30ML; MG/30ML; MG/30ML
30 SUSPENSION ORAL ONCE
Status: COMPLETED | OUTPATIENT
Start: 2025-01-22 | End: 2025-01-22

## 2025-01-22 RX ORDER — PANTOPRAZOLE SODIUM 40 MG/1
40 TABLET, DELAYED RELEASE ORAL
Status: COMPLETED | OUTPATIENT
Start: 2025-01-22 | End: 2025-01-22

## 2025-01-22 RX ORDER — ONDANSETRON HYDROCHLORIDE 2 MG/ML
4 INJECTION, SOLUTION INTRAVENOUS
Status: DISCONTINUED | OUTPATIENT
Start: 2025-01-22 | End: 2025-01-22

## 2025-01-22 RX ORDER — MORPHINE SULFATE 2 MG/ML
4 INJECTION, SOLUTION INTRAMUSCULAR; INTRAVENOUS
Status: COMPLETED | OUTPATIENT
Start: 2025-01-22 | End: 2025-01-22

## 2025-01-22 RX ORDER — LIDOCAINE HYDROCHLORIDE 20 MG/ML
15 SOLUTION OROPHARYNGEAL ONCE
Status: COMPLETED | OUTPATIENT
Start: 2025-01-22 | End: 2025-01-22

## 2025-01-22 RX ORDER — PANTOPRAZOLE SODIUM 40 MG/1
40 TABLET, DELAYED RELEASE ORAL NIGHTLY
Qty: 30 TABLET | Refills: 11 | Status: SHIPPED | OUTPATIENT
Start: 2025-01-22 | End: 2026-01-22

## 2025-01-22 RX ADMIN — ALUMINUM HYDROXIDE, MAGNESIUM HYDROXIDE, AND SIMETHICONE 30 ML: 200; 200; 20 SUSPENSION ORAL at 05:01

## 2025-01-22 RX ADMIN — LIDOCAINE HYDROCHLORIDE 15 ML: 20 SOLUTION ORAL at 05:01

## 2025-01-22 RX ADMIN — MORPHINE SULFATE 4 MG: 2 INJECTION, SOLUTION INTRAMUSCULAR; INTRAVENOUS at 03:01

## 2025-01-22 RX ADMIN — ONDANSETRON 4 MG: 2 INJECTION INTRAMUSCULAR; INTRAVENOUS at 03:01

## 2025-01-22 RX ADMIN — PANTOPRAZOLE 40 MG: 40 TABLET, DELAYED RELEASE ORAL at 05:01

## 2025-01-22 NOTE — ED PROVIDER NOTES
Encounter Date: 1/22/2025       History     Chief Complaint   Patient presents with    Abdominal Pain     Arrived with son. Reports sharp midline abdominal pain since yesterday. States he has had gallstones in the past. Looks uncomfortable during triage. States he took a norco 7.5 at 2030 last night but has not relieved pain.      Patient is a 59-year-old gentleman with a known history of congestive heart failure, diabetes, hyperlipidemia, kidney stones, low testosterone, migraines, vitamin-D deficiency, and hypertension who presents to the emergency room and states he was apparently well until about 8:00 p.m. yesterday when he developed a gradual onset of epigastric abdominal pain while at rest.  Patient describes the pain as constant ever since, states it has been nonradiating, has a known aggravating or relieving factors, and he admits to similar symptomatology in the past but is unsure what the diagnosis was at that time and thinks it might have had something do with his kidney gallstones.  Patient denies any known history of pancreatitis, no history of diverticular disease, no history of colitis, history of a bowel she, and he is is had a lithotripsy before.  Patient states he has had some associated nausea with 3 episodes of vomiting but adds that pain became too severe tonight so decided come to the emergency room to be evaluated.    The history is provided by the patient. No  was used.     Review of patient's allergies indicates:  No Known Allergies  Past Medical History:   Diagnosis Date    CHF (congestive heart failure)     Diabetes mellitus, type 2     Hyperlipidemia     IGT (impaired glucose tolerance)     Kidney stones     Low testosterone in male     Migraine     Morbid obesity     Vitamin D deficiency      Past Surgical History:   Procedure Laterality Date    ANGIOGRAM, CORONARY, WITH LEFT HEART CATHETERIZATION N/A 08/01/2022    Procedure: Angiogram, Coronary, with Left Heart Cath;   Surgeon: Lev Manuel MD;  Location: [facility] CATH LAB;  Service: Cardiology;  Laterality: N/A;    kidney stones removal      rotator cuff surgery       Family History   Problem Relation Name Age of Onset    Cancer Mother      Heart disease Father      No Known Problems Sister      No Known Problems Brother       Social History     Tobacco Use    Smoking status: Never    Smokeless tobacco: Never   Substance Use Topics    Alcohol use: Never    Drug use: Never     Review of Systems   Constitutional:  Negative for activity change, chills, diaphoresis and fever.   HENT:  Negative for congestion, ear pain, nosebleeds, rhinorrhea, sinus pressure and sore throat.    Eyes:  Negative for visual disturbance.   Respiratory:  Negative for cough, chest tightness, shortness of breath and wheezing.    Cardiovascular:  Negative for chest pain and palpitations.   Gastrointestinal:  Positive for abdominal pain, nausea and vomiting. Negative for constipation and diarrhea.   Genitourinary:  Negative for dysuria.   Musculoskeletal:  Negative for arthralgias, back pain, joint swelling and myalgias.   Skin:  Negative for color change and rash.   Neurological:  Negative for dizziness, syncope, weakness, numbness and headaches.   Psychiatric/Behavioral:  Negative for behavioral problems, self-injury and suicidal ideas.    All other systems reviewed and are negative.      Physical Exam     Initial Vitals [01/22/25 0315]   BP Pulse Resp Temp SpO2   (!) 138/92 95 20 97.5 °F (36.4 °C) 98 %      MAP       --         Physical Exam    Nursing note and vitals reviewed.  Constitutional: He appears well-developed and well-nourished. He is not diaphoretic. He appears distressed (In moderate painful distress).   Obese, afebrile, not pale, anicteric, no cyanosis   HENT:   Head: Normocephalic and atraumatic.   Right Ear: External ear normal.   Left Ear: External ear normal.   Nose: Nose normal. Mouth/Throat: Oropharynx is clear and moist. No  oropharyngeal exudate.   Eyes: Conjunctivae and EOM are normal. Pupils are equal, round, and reactive to light. No scleral icterus.   Neck: Neck supple. No thyromegaly present. No tracheal deviation present.   Normal range of motion.  Cardiovascular:  Normal rate, regular rhythm, normal heart sounds and intact distal pulses.     Exam reveals no gallop and no friction rub.       No murmur heard.  Pulmonary/Chest: Breath sounds normal. No respiratory distress. He has no wheezes. He has no rhonchi. He has no rales.   Abdominal: Abdomen is soft. Bowel sounds are normal. There is abdominal tenderness (Severe exquisite tenderness in epigastric area, mild tenderness in right and left hypochondrium). There is no rebound and no guarding.   Musculoskeletal:         General: No tenderness or edema. Normal range of motion.      Cervical back: Normal range of motion and neck supple.     Lymphadenopathy:     He has no cervical adenopathy.   Neurological: He is alert and oriented to person, place, and time. He has normal strength. No cranial nerve deficit. GCS score is 15. GCS eye subscore is 4. GCS verbal subscore is 5. GCS motor subscore is 6.   Skin: Skin is warm and dry.   Psychiatric: He has a normal mood and affect. Thought content normal.         ED Course   Procedures  Labs Reviewed   COMPREHENSIVE METABOLIC PANEL - Abnormal       Result Value    Sodium 141      Potassium 4.1      Chloride 109 (*)     CO2 22      Glucose 131 (*)     Blood Urea Nitrogen 10.3      Creatinine 0.81      Calcium 9.9      Protein Total 7.2      Albumin 3.8      Globulin 3.4      Albumin/Globulin Ratio 1.1      Bilirubin Total 0.4      ALP 76      ALT 19      AST 32      eGFR >60      Anion Gap 10.0      BUN/Creatinine Ratio 13     URINALYSIS, REFLEX TO URINE CULTURE - Abnormal    Color, UA Light-Yellow      Appearance, UA Clear      Specific Gravity, UA 1.021      pH, UA 8.5      Protein, UA Trace (*)     Glucose, UA 3+ (*)     Ketones, UA  Negative      Blood, UA Negative      Bilirubin, UA Negative      Urobilinogen, UA Normal      Nitrites, UA Negative      Leukocyte Esterase, UA Negative      RBC, UA 0-5      WBC, UA 0-5      Bacteria, UA None Seen      Squamous Epithelial Cells, UA Trace (*)     Mucous, UA Trace (*)     Hyaline Casts, UA None Seen     CBC WITH DIFFERENTIAL - Abnormal    WBC 5.33      RBC 4.51 (*)     Hgb 12.9 (*)     Hct 39.7 (*)     MCV 88.0      MCH 28.6      MCHC 32.5 (*)     RDW 13.4      Platelet 342      MPV 10.0      Neut % 56.9      Lymph % 33.6      Mono % 6.4      Eos % 2.3      Basophil % 0.6      Imm Grans % 0.2      Neut # 3.04      Lymph # 1.79      Mono # 0.34      Eos # 0.12      Baso # 0.03      Imm Gran # 0.01      NRBC% 0.0     LIPASE - Normal    Lipase Level 15     TROPONIN I - Normal    Troponin-I <0.010     CBC W/ AUTO DIFFERENTIAL    Narrative:     The following orders were created for panel order CBC auto differential.  Procedure                               Abnormality         Status                     ---------                               -----------         ------                     CBC with Differential[[id number]]       Abnormal            Final result                 Please view results for these tests on the individual orders.     EKG Readings: (Independently Interpreted)   ECG done at 3:35 a.m. interpreted by me     Normal sinus rhythm with a ventricular rate of 57 beats per minute, normal axis, no ST segment changes, no new onset Q-waves, no appreciable bundle branch block, no voltage criteria for LVH, normal intervals, normal T-waves, no delta waves       Imaging Results    None          Medications   morphine injection 4 mg (4 mg Intravenous Given 1/22/25 3783)   ondansetron injection 4 mg (4 mg Intravenous Given 1/22/25 4565)   aluminum-magnesium hydroxide-simethicone 200-200-20 mg/5 mL suspension 30 mL (30 mLs Oral Given 1/22/25 2044)     And   LIDOcaine viscous HCl 2% oral solution 15 mL  (15 mLs Oral Given 1/22/25 2702)   pantoprazole EC tablet 40 mg (40 mg Oral Given 1/22/25 5612)     Medical Decision Making  Here in the emergency room patient is given IV morphine and IV Zofran which he tolerates.  Patient has a cascade of labs drawn coupled with the urine sample obtained.  ECG is also done which returned showing patient to be normal sinus rhythm. Labs returned relatively within normal limits except for urinalysis which has trace protein and 3+ glucose.  CMP returned relatively within normal limits except for chloride which is mildly elevated at 109 with a glucose of 131, and CBC shows a hemoglobin slightly low at 12.9.  Lipase is noted to be negative and troponin is less than 0.01.  These findings are reviewed with the patient and he verbalizes his understanding.  Patient complains of pain again and he is given a GI cocktail and he states his pain got significantly improved and went from an 8 almost immediately to a 4.  Patient's clinical picture seems to be in keeping with GERD and this is further discussed with him and he expresses his understanding.  Patient is given pantoprazole 40 mg p.o. x1 here in the emergency room and he will be discharged home with pantoprazole 40 mg p.o. q.h.s..  He is advised to follow up with his primary medical doctor in 2-3 days as needed and to return to the ER if clinical picture worsens.  Patient verbalizes his understanding and his condition upon discharge is stable, vitals remained stable, and he is able to ambulate independently out of the emergency room.      Amount and/or Complexity of Data Reviewed  Labs: ordered.    Risk  OTC drugs.  Prescription drug management.                                  1. Differential diagnosis:  Pancreatitis, cholelithiasis, cholecystitis, bowel obstruction  2. Comorbidities considered that were addressed or put patient at increased risk are reviewed and noted  3. External notes reviewed: As stated in MDM  4. Discussed case and  management with patient  5. Independent interpretations of workup like ECG, labs, and imaging  6. Diagnostic therapy is considered, ordered and those not considered. Scores considered  7. Social determinants of health considered (living situation and conditions, lives far, family siutation, psychiatric limitations, and possible substance abuse etc)    Clinical Impression:  Final diagnoses:  [R10.9] Abdominal pain  [K21.9] Gastroesophageal reflux disease without esophagitis (Primary)       This chart is generated using a voice recognition system.  Grammatical and content areas may inadvertently be generated in error.  Please contact me if you find a perceive any inappropriate information in this chart.  Thank you.   ED Disposition Condition    Discharge Stable          ED Prescriptions       Medication Sig Dispense Start Date End Date Auth. Provider    pantoprazole (PROTONIX) 40 MG tablet Take 1 tablet (40 mg total) by mouth every evening. 30 tablet 1/22/2025 1/22/2026 Fish Tanner MD          Follow-up Information       Follow up With Specialties Details Why Contact Info    Clyde Hyatt MD Internal Medicine Call in 2 days As needed [address] [facility] [address] [phone]               Fish Tanner MD  01/22/25 2429

## 2025-01-24 LAB
OHS QRS DURATION: 106 MS
OHS QTC CALCULATION: 393 MS

## 2025-01-27 ENCOUNTER — ANESTHESIA EVENT (OUTPATIENT)
Dept: SURGERY | Facility: HOSPITAL | Age: 60
DRG: 415 | End: 2025-01-27
Payer: COMMERCIAL

## 2025-01-27 ENCOUNTER — HOSPITAL ENCOUNTER (INPATIENT)
Facility: HOSPITAL | Age: 60
LOS: 2 days | Discharge: HOME HEALTH | DRG: 415 | End: 2025-01-29
Attending: INTERNAL MEDICINE | Admitting: SURGERY
Payer: COMMERCIAL

## 2025-01-27 ENCOUNTER — ANESTHESIA (OUTPATIENT)
Dept: SURGERY | Facility: HOSPITAL | Age: 60
DRG: 415 | End: 2025-01-27
Payer: COMMERCIAL

## 2025-01-27 DIAGNOSIS — R10.13 COLICKY EPIGASTRIC PAIN: ICD-10-CM

## 2025-01-27 DIAGNOSIS — K81.0 ACUTE CHOLECYSTITIS: Primary | ICD-10-CM

## 2025-01-27 LAB
ALBUMIN SERPL-MCNC: 3.4 G/DL (ref 3.5–5)
ALBUMIN/GLOB SERPL: 0.9 RATIO (ref 1.1–2)
ALP SERPL-CCNC: 81 UNIT/L (ref 40–150)
ALT SERPL-CCNC: 45 UNIT/L (ref 0–55)
ANION GAP SERPL CALC-SCNC: 10 MEQ/L
AST SERPL-CCNC: 48 UNIT/L (ref 5–34)
BILIRUB SERPL-MCNC: 0.6 MG/DL
BUN SERPL-MCNC: 10 MG/DL (ref 8.4–25.7)
CALCIUM SERPL-MCNC: 10 MG/DL (ref 8.4–10.2)
CHLORIDE SERPL-SCNC: 109 MMOL/L (ref 98–107)
CO2 SERPL-SCNC: 20 MMOL/L (ref 22–29)
CREAT SERPL-MCNC: 0.85 MG/DL (ref 0.72–1.25)
CREAT/UREA NIT SERPL: 12
ERYTHROCYTE [DISTWIDTH] IN BLOOD BY AUTOMATED COUNT: 13.2 % (ref 11.5–17)
EST. AVERAGE GLUCOSE BLD GHB EST-MCNC: 111.2 MG/DL
GFR SERPLBLD CREATININE-BSD FMLA CKD-EPI: >60 ML/MIN/1.73/M2
GLOBULIN SER-MCNC: 3.7 GM/DL (ref 2.4–3.5)
GLUCOSE SERPL-MCNC: 134 MG/DL (ref 74–100)
HBA1C MFR BLD: 5.5 %
HCT VFR BLD AUTO: 40.9 % (ref 42–52)
HGB BLD-MCNC: 13.1 G/DL (ref 14–18)
MAGNESIUM SERPL-MCNC: 1.9 MG/DL (ref 1.6–2.6)
MCH RBC QN AUTO: 28.1 PG (ref 27–31)
MCHC RBC AUTO-ENTMCNC: 32 G/DL (ref 33–36)
MCV RBC AUTO: 87.6 FL (ref 80–94)
NRBC BLD AUTO-RTO: 0 %
PHOSPHATE SERPL-MCNC: 3.2 MG/DL (ref 2.3–4.7)
PLATELET # BLD AUTO: 329 X10(3)/MCL (ref 130–400)
PMV BLD AUTO: 8.8 FL (ref 7.4–10.4)
POCT GLUCOSE: 152 MG/DL (ref 70–110)
POCT GLUCOSE: 153 MG/DL (ref 70–110)
POCT GLUCOSE: 169 MG/DL (ref 70–110)
POCT GLUCOSE: 87 MG/DL (ref 70–110)
POTASSIUM SERPL-SCNC: 3.9 MMOL/L (ref 3.5–5.1)
PROT SERPL-MCNC: 7.1 GM/DL (ref 6.4–8.3)
RBC # BLD AUTO: 4.67 X10(6)/MCL (ref 4.7–6.1)
SODIUM SERPL-SCNC: 139 MMOL/L (ref 136–145)
WBC # BLD AUTO: 7.65 X10(3)/MCL (ref 4.5–11.5)

## 2025-01-27 PROCEDURE — 25000003 PHARM REV CODE 250

## 2025-01-27 PROCEDURE — D9220A PRA ANESTHESIA: Mod: ANES,,, | Performed by: ANESTHESIOLOGY

## 2025-01-27 PROCEDURE — 0FJ44ZZ INSPECTION OF GALLBLADDER, PERCUTANEOUS ENDOSCOPIC APPROACH: ICD-10-PCS | Performed by: SURGERY

## 2025-01-27 PROCEDURE — 94799 UNLISTED PULMONARY SVC/PX: CPT

## 2025-01-27 PROCEDURE — 63600175 PHARM REV CODE 636 W HCPCS: Performed by: SURGERY

## 2025-01-27 PROCEDURE — 25000003 PHARM REV CODE 250: Performed by: NURSE ANESTHETIST, CERTIFIED REGISTERED

## 2025-01-27 PROCEDURE — 63600175 PHARM REV CODE 636 W HCPCS: Performed by: INTERNAL MEDICINE

## 2025-01-27 PROCEDURE — 63600175 PHARM REV CODE 636 W HCPCS: Performed by: ANESTHESIOLOGY

## 2025-01-27 PROCEDURE — 37000009 HC ANESTHESIA EA ADD 15 MINS: Performed by: SURGERY

## 2025-01-27 PROCEDURE — 84100 ASSAY OF PHOSPHORUS: CPT

## 2025-01-27 PROCEDURE — 71000033 HC RECOVERY, INTIAL HOUR: Performed by: SURGERY

## 2025-01-27 PROCEDURE — 88304 TISSUE EXAM BY PATHOLOGIST: CPT | Mod: TC | Performed by: SURGERY

## 2025-01-27 PROCEDURE — 11000001 HC ACUTE MED/SURG PRIVATE ROOM

## 2025-01-27 PROCEDURE — 99285 EMERGENCY DEPT VISIT HI MDM: CPT | Mod: 25

## 2025-01-27 PROCEDURE — 63600175 PHARM REV CODE 636 W HCPCS: Performed by: NURSE ANESTHETIST, CERTIFIED REGISTERED

## 2025-01-27 PROCEDURE — 94761 N-INVAS EAR/PLS OXIMETRY MLT: CPT

## 2025-01-27 PROCEDURE — 96374 THER/PROPH/DIAG INJ IV PUSH: CPT

## 2025-01-27 PROCEDURE — 63600175 PHARM REV CODE 636 W HCPCS

## 2025-01-27 PROCEDURE — C1729 CATH, DRAINAGE: HCPCS | Performed by: SURGERY

## 2025-01-27 PROCEDURE — 37000008 HC ANESTHESIA 1ST 15 MINUTES: Performed by: SURGERY

## 2025-01-27 PROCEDURE — 36000709 HC OR TIME LEV III EA ADD 15 MIN: Performed by: SURGERY

## 2025-01-27 PROCEDURE — 83036 HEMOGLOBIN GLYCOSYLATED A1C: CPT

## 2025-01-27 PROCEDURE — 0FB40ZZ EXCISION OF GALLBLADDER, OPEN APPROACH: ICD-10-PCS | Performed by: SURGERY

## 2025-01-27 PROCEDURE — 80053 COMPREHEN METABOLIC PANEL: CPT

## 2025-01-27 PROCEDURE — 27201423 OPTIME MED/SURG SUP & DEVICES STERILE SUPPLY: Performed by: SURGERY

## 2025-01-27 PROCEDURE — 85027 COMPLETE CBC AUTOMATED: CPT

## 2025-01-27 PROCEDURE — 83735 ASSAY OF MAGNESIUM: CPT

## 2025-01-27 PROCEDURE — 96375 TX/PRO/DX INJ NEW DRUG ADDON: CPT

## 2025-01-27 PROCEDURE — 36000708 HC OR TIME LEV III 1ST 15 MIN: Performed by: SURGERY

## 2025-01-27 PROCEDURE — D9220A PRA ANESTHESIA: Mod: CRNA,,, | Performed by: NURSE ANESTHETIST, CERTIFIED REGISTERED

## 2025-01-27 PROCEDURE — 36415 COLL VENOUS BLD VENIPUNCTURE: CPT

## 2025-01-27 RX ORDER — ACETAMINOPHEN 325 MG/1
650 TABLET ORAL EVERY 8 HOURS PRN
Status: DISCONTINUED | OUTPATIENT
Start: 2025-01-27 | End: 2025-01-29 | Stop reason: HOSPADM

## 2025-01-27 RX ORDER — HEPARIN SODIUM 5000 [USP'U]/ML
5000 INJECTION, SOLUTION INTRAVENOUS; SUBCUTANEOUS
Status: COMPLETED | OUTPATIENT
Start: 2025-01-27 | End: 2025-01-27

## 2025-01-27 RX ORDER — SODIUM CHLORIDE, SODIUM LACTATE, POTASSIUM CHLORIDE, CALCIUM CHLORIDE 600; 310; 30; 20 MG/100ML; MG/100ML; MG/100ML; MG/100ML
INJECTION, SOLUTION INTRAVENOUS CONTINUOUS
Status: DISCONTINUED | OUTPATIENT
Start: 2025-01-27 | End: 2025-01-29 | Stop reason: HOSPADM

## 2025-01-27 RX ORDER — DEXAMETHASONE SODIUM PHOSPHATE 4 MG/ML
INJECTION, SOLUTION INTRA-ARTICULAR; INTRALESIONAL; INTRAMUSCULAR; INTRAVENOUS; SOFT TISSUE
Status: DISCONTINUED | OUTPATIENT
Start: 2025-01-27 | End: 2025-01-27

## 2025-01-27 RX ORDER — BUPIVACAINE HYDROCHLORIDE 5 MG/ML
INJECTION, SOLUTION EPIDURAL; INTRACAUDAL
Status: DISCONTINUED | OUTPATIENT
Start: 2025-01-27 | End: 2025-01-27 | Stop reason: HOSPADM

## 2025-01-27 RX ORDER — INSULIN ASPART 100 [IU]/ML
0-10 INJECTION, SOLUTION INTRAVENOUS; SUBCUTANEOUS EVERY 6 HOURS PRN
Status: DISCONTINUED | OUTPATIENT
Start: 2025-01-27 | End: 2025-01-29 | Stop reason: HOSPADM

## 2025-01-27 RX ORDER — GLUCAGON 1 MG
1 KIT INJECTION
Status: DISCONTINUED | OUTPATIENT
Start: 2025-01-27 | End: 2025-01-27

## 2025-01-27 RX ORDER — ATORVASTATIN CALCIUM 40 MG/1
40 TABLET, FILM COATED ORAL DAILY
Status: DISCONTINUED | OUTPATIENT
Start: 2025-01-27 | End: 2025-01-29 | Stop reason: HOSPADM

## 2025-01-27 RX ORDER — PROPOFOL 10 MG/ML
VIAL (ML) INTRAVENOUS
Status: DISCONTINUED | OUTPATIENT
Start: 2025-01-27 | End: 2025-01-27

## 2025-01-27 RX ORDER — MEPERIDINE HYDROCHLORIDE 25 MG/ML
12.5 INJECTION INTRAMUSCULAR; INTRAVENOUS; SUBCUTANEOUS EVERY 10 MIN PRN
Status: DISCONTINUED | OUTPATIENT
Start: 2025-01-27 | End: 2025-01-27

## 2025-01-27 RX ORDER — LABETALOL HYDROCHLORIDE 5 MG/ML
INJECTION, SOLUTION INTRAVENOUS
Status: DISCONTINUED | OUTPATIENT
Start: 2025-01-27 | End: 2025-01-27

## 2025-01-27 RX ORDER — PANTOPRAZOLE SODIUM 40 MG/1
40 TABLET, DELAYED RELEASE ORAL NIGHTLY
Status: DISCONTINUED | OUTPATIENT
Start: 2025-01-27 | End: 2025-01-29 | Stop reason: HOSPADM

## 2025-01-27 RX ORDER — ONDANSETRON HYDROCHLORIDE 2 MG/ML
4 INJECTION, SOLUTION INTRAVENOUS EVERY 12 HOURS PRN
Status: DISCONTINUED | OUTPATIENT
Start: 2025-01-27 | End: 2025-01-29 | Stop reason: HOSPADM

## 2025-01-27 RX ORDER — MIDAZOLAM HYDROCHLORIDE 2 MG/2ML
2 INJECTION, SOLUTION INTRAMUSCULAR; INTRAVENOUS ONCE AS NEEDED
Status: COMPLETED | OUTPATIENT
Start: 2025-01-27 | End: 2025-01-27

## 2025-01-27 RX ORDER — KETOROLAC TROMETHAMINE 30 MG/ML
30 INJECTION, SOLUTION INTRAMUSCULAR; INTRAVENOUS
Status: COMPLETED | OUTPATIENT
Start: 2025-01-27 | End: 2025-01-27

## 2025-01-27 RX ORDER — MORPHINE SULFATE 2 MG/ML
4 INJECTION, SOLUTION INTRAMUSCULAR; INTRAVENOUS
Status: DISCONTINUED | OUTPATIENT
Start: 2025-01-27 | End: 2025-01-29 | Stop reason: HOSPADM

## 2025-01-27 RX ORDER — SUCCINYLCHOLINE CHLORIDE 20 MG/ML
INJECTION INTRAMUSCULAR; INTRAVENOUS
Status: DISCONTINUED | OUTPATIENT
Start: 2025-01-27 | End: 2025-01-27

## 2025-01-27 RX ORDER — OXYCODONE HYDROCHLORIDE 5 MG/1
10 TABLET ORAL EVERY 4 HOURS PRN
Status: DISCONTINUED | OUTPATIENT
Start: 2025-01-27 | End: 2025-01-29 | Stop reason: HOSPADM

## 2025-01-27 RX ORDER — OXYCODONE HYDROCHLORIDE 5 MG/1
5 TABLET ORAL
Status: DISCONTINUED | OUTPATIENT
Start: 2025-01-27 | End: 2025-01-27

## 2025-01-27 RX ORDER — METOPROLOL SUCCINATE 25 MG/1
25 TABLET, EXTENDED RELEASE ORAL DAILY
Status: DISCONTINUED | OUTPATIENT
Start: 2025-01-27 | End: 2025-01-29 | Stop reason: HOSPADM

## 2025-01-27 RX ORDER — ONDANSETRON HYDROCHLORIDE 2 MG/ML
INJECTION, SOLUTION INTRAVENOUS
Status: DISCONTINUED | OUTPATIENT
Start: 2025-01-27 | End: 2025-01-27

## 2025-01-27 RX ORDER — SODIUM CHLORIDE 0.9 % (FLUSH) 0.9 %
10 SYRINGE (ML) INJECTION
Status: DISCONTINUED | OUTPATIENT
Start: 2025-01-27 | End: 2025-01-29 | Stop reason: HOSPADM

## 2025-01-27 RX ORDER — ALBUTEROL SULFATE 90 UG/1
2 INHALANT RESPIRATORY (INHALATION) DAILY PRN
Status: DISCONTINUED | OUTPATIENT
Start: 2025-01-27 | End: 2025-01-29 | Stop reason: HOSPADM

## 2025-01-27 RX ORDER — ATROPINE SULFATE 0.4 MG/ML
INJECTION, SOLUTION ENDOTRACHEAL; INTRAMEDULLARY; INTRAMUSCULAR; INTRAVENOUS; SUBCUTANEOUS
Status: DISCONTINUED | OUTPATIENT
Start: 2025-01-27 | End: 2025-01-27

## 2025-01-27 RX ORDER — SODIUM CHLORIDE, SODIUM LACTATE, POTASSIUM CHLORIDE, CALCIUM CHLORIDE 600; 310; 30; 20 MG/100ML; MG/100ML; MG/100ML; MG/100ML
INJECTION, SOLUTION INTRAVENOUS CONTINUOUS
Status: CANCELLED | OUTPATIENT
Start: 2025-01-27

## 2025-01-27 RX ORDER — ROCURONIUM BROMIDE 10 MG/ML
INJECTION, SOLUTION INTRAVENOUS
Status: DISCONTINUED | OUTPATIENT
Start: 2025-01-27 | End: 2025-01-27

## 2025-01-27 RX ORDER — OXYCODONE HYDROCHLORIDE 5 MG/1
5 TABLET ORAL EVERY 4 HOURS PRN
Status: DISCONTINUED | OUTPATIENT
Start: 2025-01-27 | End: 2025-01-29 | Stop reason: HOSPADM

## 2025-01-27 RX ORDER — FENTANYL CITRATE 50 UG/ML
INJECTION, SOLUTION INTRAMUSCULAR; INTRAVENOUS
Status: DISCONTINUED | OUTPATIENT
Start: 2025-01-27 | End: 2025-01-27

## 2025-01-27 RX ORDER — KETOROLAC TROMETHAMINE 30 MG/ML
INJECTION, SOLUTION INTRAMUSCULAR; INTRAVENOUS
Status: DISCONTINUED | OUTPATIENT
Start: 2025-01-27 | End: 2025-01-27

## 2025-01-27 RX ORDER — EPHEDRINE SULFATE 50 MG/ML
INJECTION, SOLUTION INTRAVENOUS
Status: DISCONTINUED | OUTPATIENT
Start: 2025-01-27 | End: 2025-01-27

## 2025-01-27 RX ORDER — ONDANSETRON HYDROCHLORIDE 2 MG/ML
4 INJECTION, SOLUTION INTRAVENOUS DAILY PRN
Status: DISCONTINUED | OUTPATIENT
Start: 2025-01-27 | End: 2025-01-27

## 2025-01-27 RX ORDER — MORPHINE SULFATE 2 MG/ML
2 INJECTION, SOLUTION INTRAMUSCULAR; INTRAVENOUS
Status: COMPLETED | OUTPATIENT
Start: 2025-01-27 | End: 2025-01-27

## 2025-01-27 RX ORDER — GLUCAGON 1 MG
1 KIT INJECTION
Status: DISCONTINUED | OUTPATIENT
Start: 2025-01-27 | End: 2025-01-29 | Stop reason: HOSPADM

## 2025-01-27 RX ORDER — LIDOCAINE HYDROCHLORIDE 20 MG/ML
INJECTION INTRAVENOUS
Status: DISCONTINUED | OUTPATIENT
Start: 2025-01-27 | End: 2025-01-27

## 2025-01-27 RX ORDER — MORPHINE SULFATE 2 MG/ML
2 INJECTION, SOLUTION INTRAMUSCULAR; INTRAVENOUS EVERY 5 MIN PRN
Status: DISCONTINUED | OUTPATIENT
Start: 2025-01-27 | End: 2025-01-27

## 2025-01-27 RX ADMIN — FENTANYL CITRATE 50 MCG: 50 INJECTION INTRAMUSCULAR; INTRAVENOUS at 01:01

## 2025-01-27 RX ADMIN — OXYCODONE 10 MG: 5 TABLET ORAL at 11:01

## 2025-01-27 RX ADMIN — PIPERACILLIN SODIUM AND TAZOBACTAM SODIUM 4.5 G: 4; .5 INJECTION, POWDER, LYOPHILIZED, FOR SOLUTION INTRAVENOUS at 06:01

## 2025-01-27 RX ADMIN — ROCURONIUM BROMIDE 45 MG: 10 INJECTION INTRAVENOUS at 01:01

## 2025-01-27 RX ADMIN — SODIUM CHLORIDE, POTASSIUM CHLORIDE, SODIUM LACTATE AND CALCIUM CHLORIDE: 600; 310; 30; 20 INJECTION, SOLUTION INTRAVENOUS at 04:01

## 2025-01-27 RX ADMIN — PANTOPRAZOLE SODIUM 40 MG: 40 TABLET, DELAYED RELEASE ORAL at 08:01

## 2025-01-27 RX ADMIN — EPHEDRINE SULFATE 25 MG: 50 INJECTION INTRAVENOUS at 01:01

## 2025-01-27 RX ADMIN — PIPERACILLIN SODIUM AND TAZOBACTAM SODIUM 4.5 G: 4; .5 INJECTION, POWDER, LYOPHILIZED, FOR SOLUTION INTRAVENOUS at 10:01

## 2025-01-27 RX ADMIN — KETOROLAC TROMETHAMINE 30 MG: 30 INJECTION, SOLUTION INTRAMUSCULAR at 09:01

## 2025-01-27 RX ADMIN — ROCURONIUM BROMIDE 5 MG: 10 INJECTION INTRAVENOUS at 01:01

## 2025-01-27 RX ADMIN — SODIUM CHLORIDE: 9 INJECTION, SOLUTION INTRAVENOUS at 01:01

## 2025-01-27 RX ADMIN — LIDOCAINE HYDROCHLORIDE 100 MG: 20 INJECTION INTRAVENOUS at 01:01

## 2025-01-27 RX ADMIN — SUCCINYLCHOLINE CHLORIDE 160 MG: 20 INJECTION, SOLUTION INTRAMUSCULAR; INTRAVENOUS; PARENTERAL at 01:01

## 2025-01-27 RX ADMIN — HEPARIN SODIUM 5000 UNITS: 5000 INJECTION INTRAVENOUS; SUBCUTANEOUS at 11:01

## 2025-01-27 RX ADMIN — MORPHINE SULFATE 2 MG: 2 INJECTION, SOLUTION INTRAMUSCULAR; INTRAVENOUS at 09:01

## 2025-01-27 RX ADMIN — MORPHINE SULFATE 4 MG: 2 INJECTION, SOLUTION INTRAMUSCULAR; INTRAVENOUS at 07:01

## 2025-01-27 RX ADMIN — OXYCODONE 10 MG: 5 TABLET ORAL at 06:01

## 2025-01-27 RX ADMIN — SUGAMMADEX 200 MG: 100 INJECTION, SOLUTION INTRAVENOUS at 03:01

## 2025-01-27 RX ADMIN — ATROPINE SULFATE 0.2 MG: 0.4 INJECTION, SOLUTION INTRAVENOUS at 01:01

## 2025-01-27 RX ADMIN — PROPOFOL 75 MG: 10 INJECTION, EMULSION INTRAVENOUS at 01:01

## 2025-01-27 RX ADMIN — LABETALOL HYDROCHLORIDE 2.5 MG: 5 INJECTION INTRAVENOUS at 02:01

## 2025-01-27 RX ADMIN — METOPROLOL SUCCINATE 25 MG: 25 TABLET, EXTENDED RELEASE ORAL at 11:01

## 2025-01-27 RX ADMIN — MIDAZOLAM HYDROCHLORIDE 2 MG: 1 INJECTION, SOLUTION INTRAMUSCULAR; INTRAVENOUS at 12:01

## 2025-01-27 RX ADMIN — ATORVASTATIN CALCIUM 40 MG: 40 TABLET, FILM COATED ORAL at 11:01

## 2025-01-27 RX ADMIN — ONDANSETRON 4 MG: 2 INJECTION INTRAMUSCULAR; INTRAVENOUS at 03:01

## 2025-01-27 RX ADMIN — KETOROLAC TROMETHAMINE 30 MG: 30 INJECTION, SOLUTION INTRAMUSCULAR at 03:01

## 2025-01-27 RX ADMIN — PROPOFOL 200 MG: 10 INJECTION, EMULSION INTRAVENOUS at 01:01

## 2025-01-27 RX ADMIN — LIDOCAINE HYDROCHLORIDE 75 MG: 20 INJECTION INTRAVENOUS at 03:01

## 2025-01-27 RX ADMIN — DEXAMETHASONE SODIUM PHOSPHATE 4 MG: 4 INJECTION, SOLUTION INTRA-ARTICULAR; INTRALESIONAL; INTRAMUSCULAR; INTRAVENOUS; SOFT TISSUE at 01:01

## 2025-01-27 NOTE — LETTER
January 29, 2025         [address] [facility] [address]  Phone: [phone]  Fax: [phone]       Patient: Chadwick Lorenzo   YOB: 1965  Date of Visit: 01/29/2025    To Whom It May Concern:    Luzmaria Lorenzo  was at [facility] on 01/27/2025 -  01/29/2025. If you have any questions or concerns, or if I can be of further assistance, please do not hesitate to contact me.    Sincerely,    Judith Dhaliwal LPN

## 2025-01-27 NOTE — ANESTHESIA POSTPROCEDURE EVALUATION
Anesthesia Post Evaluation    Patient: Chadwick Lorenzo    Procedure(s) Performed: Procedure(s) (LRB):  CHOLECYSTECTOMY, LAPAROSCOPIC (N/A)  CHOLECYSTECTOMY    Final Anesthesia Type: general      Patient location during evaluation: PACU  Post-procedure vital signs: reviewed and stable  Airway patency: patent      Anesthetic complications: no      Cardiovascular status: hemodynamically stable  Respiratory status: spontaneous ventilation  Follow-up not needed.              Vitals Value Taken Time   /80 01/27/25 1546   Temp  01/27/25 1549   Pulse 72 01/27/25 1546   Resp 15 01/27/25 1546   SpO2 97 % 01/27/25 1546         No case tracking events are documented in the log.      Pain/Shana Score: Pain Rating Prior to Med Admin: 0 (1/27/2025 12:14 PM)  Shana Score: 7 (1/27/2025  3:46 PM)

## 2025-01-27 NOTE — MEDICAL/APP STUDENT
[facility]  General Surgery - Gold Team  H&P Note  Admit Date: 1/27/2025  HD#0    Patient Name: Chadwick Lorenzo  YOB: 1965  Date: 01/27/2025 9:55 AM  Date of Admission: 1/27/2025    PRESENTING HISTORY   Chief Complaint/Reason for Admission: Mid epigastric/RUQ pain    History of Present Illness:  Chadwick Lorenzo is a 59 y.o male with a PMHx of HLD, DMT2, HFrEF (60% on echo Jan 2023), migraines, kidney stones, and obesity. He is s/p angio coronary w/ left heart cath 2022. Patient had acute onset of mid epigastric pain 1/26/2025 at 10:30 pm. He has had similar episodes in the past postprandial. Patient initially thought pain was related to reflux, but did not subside after taking Nexium and water with baking soda. No alleviating factors. Patient presented to the ED and has been NPO since midnight. He denies any fever, chills, or vomiting.     Review of Systems:  12 point ROS negative except as stated in HPI    PAST HISTORY:   Past medical history:  Past Medical History:   Diagnosis Date    CHF (congestive heart failure)     Diabetes mellitus, type 2     Hyperlipidemia     IGT (impaired glucose tolerance)     Kidney stones     Low testosterone in male     Migraine     Morbid obesity     Vitamin D deficiency        Past surgical history:  Past Surgical History:   Procedure Laterality Date    ANGIOGRAM, CORONARY, WITH LEFT HEART CATHETERIZATION N/A 08/01/2022    Procedure: Angiogram, Coronary, with Left Heart Cath;  Surgeon: Lev Manuel MD;  Location: [facility] CATH LAB;  Service: Cardiology;  Laterality: N/A;    kidney stones removal      rotator cuff surgery         Family history:  Mother- cancer  Father- heart disease    Social history:  Tobacco: never  Alcohol: none  Drug use: none    MEDICATIONS & ALLERGIES:   Home meds:   - Metoprolol succinate 25 mg  - Pantoprazole 40 mg  - Atorvastatin 40 mg  - Metformin 500 mg  - Empagliflozin 10 mg  - ASA 81 mg  - Mounjaro 15 mg/0.5 mL  -  "Entresto 24-26 mg  - albuterol inhaler prn    Allergies: NKDA    OBJECTIVE:   Vital Signs:  VITAL SIGNS: 24 HR MIN & MAX LAST   Temp  Min: 97.5 °F (36.4 °C)  Max: 97.9 °F (36.6 °C)  97.5 °F (36.4 °C)   BP  Min: 126/77  Max: 155/87  (!) 141/91    Pulse  Min: 54  Max: 72  72    Resp  Min: 18  Max: 20  20    SpO2  Min: 98 %  Max: 100 %  98 %      HT: 5' 8" (172.7 cm)  WT: 115 kg (253 lb 8.5 oz)  BMI: 38.6     Intake/output:  N/A    Physical Exam:  General: Mild distress, obese  HEENT: PERRL  CV: RR  Resp: clear to auscultation bilaterally, stating well on room air  GI:  S/ND, mid epigastric tenderness to palpations, positive Gonzalez's, no guarding or rebound tenderness  :  Deferred  MSK: Moves all extremities spontaneously and purposefully  Skin/wounds:  No rashes, ulcers, erythema  Neuro:  CNII-XII grossly intact, A&Ox3    Labs:  Recent Labs     01/26/25  2347   WBC 6.74   HGB 12.8*   HCT 40.2*         K 4.3      CO2 24   BUN 11.0   CREATININE 0.86   BILITOT 0.3   AST 24   ALT 24   ALKPHOS 98   CALCIUM 10.3*   ALBUMIN 3.4*       Imaging:   Imaging Results              US Abdomen Limited (Final result)  Result time 01/27/25 08:49:03      Final result by Mita Mackey MD (01/27/25 08:49:03)                   Impression:      Cholelithiasis with gallbladder wall thickening and positive sonographic Gonzalez sign, suggestive of acute cholecystitis.      Electronically signed by: Mita Mackey  Date:    01/27/2025  Time:    08:49               Narrative:    EXAMINATION:  US ABDOMEN LIMITED    CLINICAL HISTORY:  Epigastric pain;    TECHNIQUE:  Gray-scale and color Doppler images of the abdomen.    COMPARISON:  CT abdomen pelvis dated 01/27/2025    FINDINGS:  The pancreas is obscured by bowel gas.  The visualized portions of the abdominal aorta and IVC are unremarkable.    The liver measures 16.8 cm. Hepatic echogenicity is increased.  There is no focal liver mass.  The main portal vein is patent " with hepatopetal flow. No ascites.    The gallbladder contains a 1.8 cm stone with a thickened wall.  The common bile duct measures 0.5 cm. Right ovary sign is positive.    The right kidney measures 10 cm.  A 4.3 cm renal cyst is noted.  There is no hydronephrosis.                                     I have reviewed all pertinent imaging results/findings within the past 24 hours.    Micro/Path:  None    ASSESSMENT & PLAN:    Chadwick Lorenzo is a 59 y.o male with a PMHx of HLD, DMT2, HFrEF (60% Jan 2023), migraines, kidney stones, and obesity. Presented to the ED 1/26/25 for mid epigastric pain. CTAP findings suggest acute cholecystitis with GB wall thickening and inflammatory strandings without fluid. U/S shows cholelithiasis w/ GB wall thickening suggestive of acute cholecystitis. Patient has been NPO since midnight. Will plan for laparoscopic cholecystectomy today.     - NPO since midnight  - plan for lap valdez today  - MMPC   - Diet: full liquid diet  - Anti-emesis: ondansetron  - return to clinic for post-op 2 weeks    DVT ppx: subcutaneous heparin    Dispo: laparoscopic cholecystectomy     Thuc Abelino   [employer] General Surgery, MS3

## 2025-01-27 NOTE — ED PROVIDER NOTES
Encounter Date: 1/27/2025       History     Chief Complaint   Patient presents with    Abdominal Pain     Pt was here last night due to epigastric pain.Had CT done that showed possible gallbladder issues. Told to come back this morning for an ultrasound.     59-year-old black male was here last night for evaluation of epigastric pain with nausea.  He had lab work done and CT scan that showed possible acalculous cholecystitis, was told to return this morning for an ultrasound.  This is his 3rd episode of similar epigastric pain, he was here approximately a year ago with similar symptoms.  Was here last week and again last night.  Has no prior history of gallbladder disease, he was diagnosed with GE reflux last week but has had no improvement with medications.  He had a previous EGD in February of 2023 at [facility], that showed gastritis.  Also had a normal colonoscopy and that same day.  Has a history of diabetes, congestive heart failure, hyperlipidemia.  He has had no previous abdominal surgery.  He does not smoke or drink.    The history is provided by the patient.     Review of patient's allergies indicates:  No Known Allergies  Past Medical History:   Diagnosis Date    CHF (congestive heart failure)     Diabetes mellitus, type 2     Hyperlipidemia     IGT (impaired glucose tolerance)     Kidney stones     Low testosterone in male     Migraine     Morbid obesity     Vitamin D deficiency      Past Surgical History:   Procedure Laterality Date    ANGIOGRAM, CORONARY, WITH LEFT HEART CATHETERIZATION N/A 08/01/2022    Procedure: Angiogram, Coronary, with Left Heart Cath;  Surgeon: Lev Manuel MD;  Location: [facility] CATH LAB;  Service: Cardiology;  Laterality: N/A;    kidney stones removal      rotator cuff surgery       Family History   Problem Relation Name Age of Onset    Cancer Mother      Heart disease Father      No Known Problems Sister      No Known Problems Brother       Social History      Tobacco Use    Smoking status: Never    Smokeless tobacco: Never   Substance Use Topics    Alcohol use: Never    Drug use: Never     Review of Systems   Constitutional:  Negative for fever.   HENT: Negative.     Eyes: Negative.    Respiratory: Negative.     Cardiovascular:  Negative for chest pain.   Gastrointestinal:  Positive for abdominal pain (Epigastric) and nausea. Negative for diarrhea and vomiting.       Physical Exam     Initial Vitals [01/27/25 0720]   BP Pulse Resp Temp SpO2   137/73 68 18 97.5 °F (36.4 °C) 98 %      MAP       --         Physical Exam    Nursing note and vitals reviewed.  Constitutional: He appears distressed (Mild).   Obese   Eyes: EOM are normal. Pupils are equal, round, and reactive to light.   Neck: Neck supple.   Normal range of motion.  Cardiovascular:  Normal rate, regular rhythm and normal heart sounds.           Pulmonary/Chest: Breath sounds normal. He has no wheezes. He has no rales.   Abdominal: Abdomen is soft. There is abdominal tenderness (Epigastric without guarding or rebound).   Musculoskeletal:      Cervical back: Normal range of motion and neck supple.     Neurological: He has normal strength. No cranial nerve deficit. GCS eye subscore is 4. GCS verbal subscore is 5. GCS motor subscore is 6.         ED Course   Procedures  Labs Reviewed - No data to display       Imaging Results              US Abdomen Limited (Final result)  Result time 01/27/25 08:49:03      Final result by Mita Mackey MD (01/27/25 08:49:03)                   Impression:      Cholelithiasis with gallbladder wall thickening and positive sonographic Gonzalez sign, suggestive of acute cholecystitis.      Electronically signed by: Mita Mackey  Date:    01/27/2025  Time:    08:49               Narrative:    EXAMINATION:  US ABDOMEN LIMITED    CLINICAL HISTORY:  Epigastric pain;    TECHNIQUE:  Gray-scale and color Doppler images of the abdomen.    COMPARISON:  CT abdomen pelvis dated  01/27/2025    FINDINGS:  The pancreas is obscured by bowel gas.  The visualized portions of the abdominal aorta and IVC are unremarkable.    The liver measures 16.8 cm. Hepatic echogenicity is increased.  There is no focal liver mass.  The main portal vein is patent with hepatopetal flow. No ascites.    The gallbladder contains a 1.8 cm stone with a thickened wall.  The common bile duct measures 0.5 cm. Right ovary sign is positive.    The right kidney measures 10 cm.  A 4.3 cm renal cyst is noted.  There is no hydronephrosis.                                       Medications   ketorolac injection 30 mg (has no administration in time range)     Medical Decision Making  59-year-old black male has been having epigastric pain for the past week, in the ER last night his CT scan suggested acute cholecystitis, ultrasound today confirms a thick gallbladder wall with gallstone consistent with acute cholecystitis.  I reviewed his lab work, I have consulted surgery to evaluate the patient in the ER.  Impressions acute cholecystitis, patient has been NPO since last night.    Amount and/or Complexity of Data Reviewed  Radiology: ordered.    Risk  Prescription drug management.  Decision regarding hospitalization.      Additional MDM:   Abdominal Pain: Medication(s) given for abdominal pain: Toradol.   Differential Diagnosis: The following diagnoses were considered: Cholecystitis and Cholelithiasis.   Other possible diagnoses: <> Choledocholithiasis.   US done: Gallbladder. The US confirms the diagnosis. Consultants: General Surgery.                                  Clinical Impression:  Final diagnoses:  [K81.0] Acute cholecystitis (Primary)  [R10.13] Colicky epigastric pain                 Nicolas Leo MD  01/27/25 0903       Nicolas Loe MD  01/27/25 0932       Nicolas Leo MD  01/27/25 1028

## 2025-01-27 NOTE — OP NOTE
[facility] - [address] [place] [address] Surg Telemetry  Operative Note    SUMMARY     Surgery Date: 1/27/2025     Surgeons and Role:     * Ayden Escobedo Jr., MD - Primary     * Laura Estrada MD - Resident - Assisting    Pre-op Diagnosis:  Acute cholecystitis    Post-op Diagnosis:  Acute on chronic cholecystitis    Procedure:  Laparoscopic converted to open cholecystectomy with drain placement    Anesthesia: General    Operative Findings:  Acute on chronic cholecystitis with large stone obstructing the neck of the gallbladder    Technique:  After informed consent was obtained the patient was taken to the operating theater and placed in the supine position.  Patient was placed under general endotracheal anesthesia without adverse event.  Preoperative antibiotics and heparin were administered.  A standard time-out was performed, all were in agreement. An infraumbilical incision was made and a Veress needle was inserted.  Pneumoperitoneum was then created with CO2 to 15 mmHg and tolerated well without any adverse changes in the patient's vital sign. A 10 mm trocar was inserted at that site. Camera was introduced into the abdomen and surveyed in all four quadrants. There was no sign of injury to intraabdominal structures or viscus from initial trocar placement. Additional trocars were introduced under direct vision. A 12 mm in epigastric and one additional 5mm ports in the right upper quadrant. The patient was positioned in the supine position with some reverse Trendelenburg and right side up. Upon inspection of the gallbladder, there was significant inflammation with densely adhered omental adhesions. These adhesions were taken down to reveal a chronically inflamed and dilated gallbladder which was unable to be grasped due to the large stone. Given the extensive inflammation the decision was made to convert to an open cholecystectomy.  An incision was made over the right upper quadrant below the costal margin using a  knife followed by Bovie electrocautery, eventually the gallbladder was identified.  Using combination of blunt dissection and Bovie electrocautery the gallbladder was taken down with a top-down approach from the liver bed.  There were dense inflammatory changes. A large stone approximately 2 cm in diameter was palpated at the neck of the gallbladder.  The gallbladder was amputated near the infundibulum and the resulting defect was inspected.  There were no additional stones identified, and bile did reflux through the mucosa of the infundibulum, likely indicating no obstructing stone in the cystic duct.  The mucosa of the infundibulum of the gallbladder was sutured with a 3-0 Vicryl.  There was excellent hemostasis noted in the liver bed.  Copious irrigation was performed of the right upper quadrant with no bilious output and no signs of bleeding.  A 19 Bulgarian drain was placed in the gallbladder fossa and secured in place at the skin.   A PDS suture was used to close the posterior and anterior rectus sheath in 2 layers.  Vicryl was used to close the 10 mm port fasia.  Staples were used to close the skin incisions.  Dressings were applied patient tolerated the procedure well count was correct x2.  The patient was emerged from general endotracheal anesthesia without issues.  Dr. Escobedo was present and scrubbed for the entire case.    Estimated Blood Loss: 100 mL    Specimens:   Specimen (24h ago, onward)       Start     Ordered    01/27/25 3531  Specimen to Pathology  RELEASE UPON ORDERING        References:    Click here for ordering Quick Tip   Question:  Release to patient  Answer:  Immediate    01/27/25 5325                  Laura Estrada MD   [employer] General Surgery PGY 3

## 2025-01-27 NOTE — ANESTHESIA PROCEDURE NOTES
Intubation    Date/Time: 1/27/2025 1:18 PM    Performed by: Glenn Sharpe CRNA  Authorized by: Jerri Bojorquez MD    Intubation:     Induction:  Rapid sequence induction (sellicks)    Intubated:  Postinduction    Mask Ventilation:  Not attempted    Attempts:  1    Attempted By:  CRNA    Method of Intubation:  Direct    Blade:  Yee 2    Laryngeal View Grade: Grade I - full view of cords      Difficult Airway Encountered?: No      Complications:  None    Airway Device:  Oral endotracheal tube    Airway Device Size:  7.5    Style/Cuff Inflation:  Cuffed (inflated to minimal occlusive pressure)    Inflation Amount (mL):  6    Tube secured:  21    Secured at:  The lips    Placement Verified By:  Capnometry    Complicating Factors:  None    Findings Post-Intubation:  BS equal bilateral and atraumatic/condition of teeth unchanged

## 2025-01-27 NOTE — ANESTHESIA PREPROCEDURE EVALUATION
01/27/2025  Chadwick Lorenzo is a 59 y.o., male with PMHx of obesity, CAD, HTN, HLD, HFrecEF, DM presents for laparoscopic cholecystectomy secondary to acute cholecystitis.    Metoprolol--last dose 1/27/25 @ 1147  Mounjaro--last dose 1/12/25    Active Ambulatory Problems     Diagnosis Date Noted    Nocturia 06/22/2022    Fatigue 06/22/2022    IGT (impaired glucose tolerance) 06/22/2022    Asthma 06/22/2022    Low testosterone 06/22/2022    Mixed diabetic hyperlipidemia associated with type 2 diabetes mellitus 06/23/2022    Type 2 diabetes mellitus with hyperglycemia, without long-term current use of insulin 06/23/2022    Nonischemic cardiomyopathy 06/24/2022    BMI 38.0-38.9,adult 07/01/2022    Screen for colon cancer 07/01/2022    FH: colon cancer in relative diagnosed at >50 years old 07/01/2022    Hx of non anemic vitamin B12 deficiency 07/01/2022    Immunization due 07/01/2022    Thunderclap headache 07/01/2022    Headache 07/01/2022    Migraine with aura 07/01/2022    Mild intermittent asthma 07/01/2022    Class 2 severe obesity due to excess calories with serious comorbidity and body mass index (BMI) of 38.0 to 38.9 in adult 07/01/2022    Seasonal allergies 07/01/2022    Severe acute respiratory syndrome coronavirus 2 (SARS-CoV-2) vaccination not indicated 07/01/2022    Primary hypertension 07/01/2022    Mixed hyperlipidemia 07/01/2022    Vitamin D deficiency     Hypertension associated with type 2 diabetes mellitus 10/04/2022    Epigastric abdominal pain 01/13/2023     Resolved Ambulatory Problems     Diagnosis Date Noted    Dizziness 06/22/2022    Chest pain 06/22/2022    Wellness examination 07/01/2022    Low vitamin D level 07/01/2022    Decreased testosterone level 07/01/2022    Long-term insulin use in type 2 diabetes 07/01/2022    Angina of effort 07/12/2022     Past Medical History:   Diagnosis  Date    CHF (congestive heart failure)     Diabetes mellitus, type 2     Hyperlipidemia     Kidney stones     Low testosterone in male     Migraine     Morbid obesity        Pre-op Assessment    I have reviewed the NPO Status.      Review of Systems  Anesthesia Hx:  No problems with previous Anesthesia                Social:  Non-Smoker       Cardiovascular:     Hypertension, well controlled   CAD  asymptomatic     CHF    hyperlipidemia                               Pulmonary:  Pulmonary Normal                       Renal/:  Renal/ Normal                 Hepatic/GI:  Hepatic/GI Normal                    Neurological:  Neurology Normal                                      Endocrine:  Diabetes, well controlled, type 2         Obesity / BMI > 30    Vitals:    01/27/25 1005 01/27/25 1100 01/27/25 1147 01/27/25 1214   BP: 139/84 127/80 127/80 122/72   BP Location:    Right arm   Patient Position:    Lying   Pulse: (!) 53 (!) 51  (!) 52   Resp:  18  18   Temp:  36.4 °C (97.5 °F)  35.6 °C (96.1 °F)   TempSrc:  Oral  Tympanic   SpO2: 96% 100%  100%   Weight:       Height:             Physical Exam  General: Alert, Cooperative and Well nourished    Airway:  Mallampati: II   Mouth Opening: Normal  TM Distance: Normal  Tongue: Normal  Neck ROM: Normal ROM    Dental:  Intact    Chest/Lungs:  Clear to auscultation, Normal Respiratory Rate    Heart:  Rate: Normal  Rhythm: Regular Rhythm  Sounds: Normal       Latest Reference Range & Units 01/27/25 10:18   POCT Glucose 70 - 110 mg/dL 87     Lab Results   Component Value Date    WBC 6.74 01/26/2025    HGB 12.8 (L) 01/26/2025    HCT 40.2 (L) 01/26/2025    MCV 88.5 01/26/2025     01/26/2025       CMP  Sodium   Date Value Ref Range Status   01/26/2025 138 136 - 145 mmol/L Final     Potassium   Date Value Ref Range Status   01/26/2025 4.3 3.5 - 5.1 mmol/L Final     Chloride   Date Value Ref Range Status   01/26/2025 107 98 - 107 mmol/L Final     CO2   Date Value Ref Range  Status   01/26/2025 24 22 - 29 mmol/L Final     Blood Urea Nitrogen   Date Value Ref Range Status   01/26/2025 11.0 8.4 - 25.7 mg/dL Final     Creatinine   Date Value Ref Range Status   01/26/2025 0.86 0.72 - 1.25 mg/dL Final     Calcium   Date Value Ref Range Status   01/26/2025 10.3 (H) 8.4 - 10.2 mg/dL Final     Albumin   Date Value Ref Range Status   01/26/2025 3.4 (L) 3.5 - 5.0 g/dL Final     Bilirubin Total   Date Value Ref Range Status   01/26/2025 0.3 <=1.5 mg/dL Final     ALP   Date Value Ref Range Status   01/26/2025 98 40 - 150 unit/L Final     AST   Date Value Ref Range Status   01/26/2025 24 5 - 34 unit/L Final     ALT   Date Value Ref Range Status   01/26/2025 24 0 - 55 unit/L Final     eGFR   Date Value Ref Range Status   01/26/2025 >60 mL/min/1.73/m2 Final     Lab Results   Component Value Date    HGBA1C 5.5 01/27/2025                       Anesthesia Plan  Type of Anesthesia, risks & benefits discussed:    Anesthesia Type: Gen ETT  Intra-op Monitoring Plan: Standard ASA Monitors  Post Op Pain Control Plan: IV/PO Opioids PRN  Induction:  rapid sequence and IV  Airway Plan: Direct  Informed Consent: Informed consent signed with the Patient and all parties understand the risks and agree with anesthesia plan.  All questions answered.   ASA Score: 3  Day of Surgery Review of History & Physical: H&P Update referred to the surgeon/provider.    Ready For Surgery From Anesthesia Perspective.     .

## 2025-01-27 NOTE — NURSING
Received report from PETE Rosado in ED. PT is stable at this time. Pt awaiting surgical procedure. PT has been NPO since midnight.

## 2025-01-27 NOTE — Clinical Note
Diagnosis: Acute cholecystitis [575.0.ICD-9-CM]   Reason for IP Medical Treatment  (Clinical interventions that can only be accomplished in the IP setting? ) :: acute cholecystitis   Plans for Post-Acute care--if anticipated (pick the single best option):: A. No post acute care anticipated at this time

## 2025-01-27 NOTE — H&P
[facility]  General Surgery - Gold Team  H&P Note  Admit Date: 1/27/2025  HD#0    Patient Name: Chadwick Lorenzo  YOB: 1965  Date: 01/27/2025 9:55 AM  Date of Admission: 1/27/2025    PRESENTING HISTORY   Chief Complaint/Reason for Admission: Mid epigastric/RUQ pain    History of Present Illness:  Chadwick Lorenzo is a 59 y.o male with a PMHx of HLD, DMT2, HFrEF (60% on echo Jan 2023), migraines, kidney stones, and obesity. He is s/p angio coronary w/ left heart cath 2022. Patient had acute onset of mid epigastric pain 1/26/2025 at 10:30 pm. He has had similar episodes in the past postprandial. Patient initially thought pain was related to reflux, but did not subside after taking Nexium and water with baking soda. No alleviating factors. Patient presented to the ED and has been NPO since midnight. He denies any fever, chills, or vomiting.     Review of Systems:  12 point ROS negative except as stated in HPI    PAST HISTORY:   Past medical history:  Past Medical History:   Diagnosis Date    CHF (congestive heart failure)     Diabetes mellitus, type 2     Hyperlipidemia     IGT (impaired glucose tolerance)     Kidney stones     Low testosterone in male     Migraine     Morbid obesity     Vitamin D deficiency        Past surgical history:  Past Surgical History:   Procedure Laterality Date    ANGIOGRAM, CORONARY, WITH LEFT HEART CATHETERIZATION N/A 08/01/2022    Procedure: Angiogram, Coronary, with Left Heart Cath;  Surgeon: Lev Manuel MD;  Location: [facility] CATH LAB;  Service: Cardiology;  Laterality: N/A;    kidney stones removal      rotator cuff surgery         Family history:  Mother- cancer  Father- heart disease    Social history:  Tobacco: never  Alcohol: none  Drug use: none    MEDICATIONS & ALLERGIES:   Home meds:   - Metoprolol succinate 25 mg  - Pantoprazole 40 mg  - Atorvastatin 40 mg  - Metformin 500 mg  - Empagliflozin 10 mg  - ASA 81 mg  - Mounjaro 15 mg/0.5 mL  -  "Entresto 24-26 mg  - albuterol inhaler prn    Allergies: NKDA    OBJECTIVE:   Vital Signs:  VITAL SIGNS: 24 HR MIN & MAX LAST   Temp  Min: 97.5 °F (36.4 °C)  Max: 97.9 °F (36.6 °C)  97.5 °F (36.4 °C)   BP  Min: 126/77  Max: 155/87  127/80    Pulse  Min: 51  Max: 72  (!) 51    Resp  Min: 18  Max: 20  18    SpO2  Min: 96 %  Max: 100 %  100 %      HT: 5' 8" (172.7 cm)  WT: 115 kg (253 lb 8.5 oz)  BMI: 38.6     Intake/output:  N/A    Physical Exam:  General: Mild distress, obese  HEENT: PERRL  CV: RR  Resp: clear to auscultation bilaterally, stating well on room air  GI:  S/ND, mid epigastric tenderness to palpations, positive Gonzalez's, no guarding or rebound tenderness  :  Deferred  MSK: Moves all extremities spontaneously and purposefully  Skin/wounds:  No rashes, ulcers, erythema  Neuro:  CNII-XII grossly intact, A&Ox3    Labs:  Recent Labs     01/26/25  2347   WBC 6.74   HGB 12.8*   HCT 40.2*         K 4.3      CO2 24   BUN 11.0   CREATININE 0.86   BILITOT 0.3   AST 24   ALT 24   ALKPHOS 98   CALCIUM 10.3*   ALBUMIN 3.4*       Imaging:   Imaging Results              US Abdomen Limited (Final result)  Result time 01/27/25 08:49:03      Final result by Mita Mackey MD (01/27/25 08:49:03)                   Impression:      Cholelithiasis with gallbladder wall thickening and positive sonographic Gonzalez sign, suggestive of acute cholecystitis.      Electronically signed by: Mita Maceky  Date:    01/27/2025  Time:    08:49               Narrative:    EXAMINATION:  US ABDOMEN LIMITED    CLINICAL HISTORY:  Epigastric pain;    TECHNIQUE:  Gray-scale and color Doppler images of the abdomen.    COMPARISON:  CT abdomen pelvis dated 01/27/2025    FINDINGS:  The pancreas is obscured by bowel gas.  The visualized portions of the abdominal aorta and IVC are unremarkable.    The liver measures 16.8 cm. Hepatic echogenicity is increased.  There is no focal liver mass.  The main portal vein is patent " with hepatopetal flow. No ascites.    The gallbladder contains a 1.8 cm stone with a thickened wall.  The common bile duct measures 0.5 cm. Right ovary sign is positive.    The right kidney measures 10 cm.  A 4.3 cm renal cyst is noted.  There is no hydronephrosis.                                     I have reviewed all pertinent imaging results/findings within the past 24 hours.    Micro/Path:  None    ASSESSMENT & PLAN:    Chadwick Lorenzo is a 59 y.o male with a PMHx of HLD, DMT2, HFrEF (60% Jan 2023), migraines, kidney stones, and obesity. Presented to the ED 1/26/25 for mid epigastric pain. CTAP findings suggest acute cholecystitis with GB wall thickening and inflammatory strandings without fluid. U/S shows cholelithiasis w/ GB wall thickening suggestive of acute cholecystitis. Patient has been NPO since midnight. Will plan for laparoscopic cholecystectomy today.     - NPO since midnight  - plan for lap valdez today  - MMPC   - Diet: full liquid diet  - Anti-emesis: ondansetron  - return to clinic for post-op 2 weeks    DVT ppx: subcutaneous heparin    Dispo: laparoscopic cholecystectomy     Thuc Roca   [employer] General Surgery, MS3     I agree with the assessment and plan and have made the appropriate edits to the note above.     Norbert Colvin MD  [employer] General Surgery, PGY-1

## 2025-01-27 NOTE — TRANSFER OF CARE
"Anesthesia Transfer of Care Note    Patient: Chadwick Lorenzo    Procedure(s) Performed: Procedure(s) (LRB):  CHOLECYSTECTOMY, LAPAROSCOPIC (N/A)  CHOLECYSTECTOMY    Patient location: PACU    Anesthesia Type: general    Transport from OR: Transported from OR on room air with adequate spontaneous ventilation    Post pain: adequate analgesia    Post assessment: no apparent anesthetic complications and tolerated procedure well    Post vital signs: stable    Level of consciousness: sedated    Nausea/Vomiting: no nausea/vomiting    Complications: none    Transfer of care protocol was followedComments: Report to Kiran RN      Last vitals: Visit Vitals  /72 (BP Location: Right arm, Patient Position: Lying)   Pulse (!) 52   Temp 35.6 °C (96.1 °F) (Tympanic)   Resp 18   Ht 5' 8" (1.727 m)   Wt 115 kg (253 lb 8.5 oz)   SpO2 100%   BMI 38.55 kg/m²     "

## 2025-01-28 LAB
ALBUMIN SERPL-MCNC: 3.1 G/DL (ref 3.5–5)
ALBUMIN/GLOB SERPL: 0.9 RATIO (ref 1.1–2)
ALP SERPL-CCNC: 75 UNIT/L (ref 40–150)
ALT SERPL-CCNC: 69 UNIT/L (ref 0–55)
ANION GAP SERPL CALC-SCNC: 7 MEQ/L
AST SERPL-CCNC: 76 UNIT/L (ref 5–34)
BASOPHILS # BLD AUTO: 0.02 X10(3)/MCL
BASOPHILS NFR BLD AUTO: 0.2 %
BILIRUB SERPL-MCNC: 0.7 MG/DL
BUN SERPL-MCNC: 14.6 MG/DL (ref 8.4–25.7)
CALCIUM SERPL-MCNC: 9.3 MG/DL (ref 8.4–10.2)
CHLORIDE SERPL-SCNC: 108 MMOL/L (ref 98–107)
CO2 SERPL-SCNC: 21 MMOL/L (ref 22–29)
CREAT SERPL-MCNC: 0.99 MG/DL (ref 0.72–1.25)
CREAT/UREA NIT SERPL: 15
EOSINOPHIL # BLD AUTO: 0.01 X10(3)/MCL (ref 0–0.9)
EOSINOPHIL NFR BLD AUTO: 0.1 %
ERYTHROCYTE [DISTWIDTH] IN BLOOD BY AUTOMATED COUNT: 13.3 % (ref 11.5–17)
GFR SERPLBLD CREATININE-BSD FMLA CKD-EPI: >60 ML/MIN/1.73/M2
GLOBULIN SER-MCNC: 3.4 GM/DL (ref 2.4–3.5)
GLUCOSE SERPL-MCNC: 115 MG/DL (ref 74–100)
HCT VFR BLD AUTO: 36.5 % (ref 42–52)
HGB BLD-MCNC: 11.9 G/DL (ref 14–18)
IMM GRANULOCYTES # BLD AUTO: 0.05 X10(3)/MCL (ref 0–0.04)
IMM GRANULOCYTES NFR BLD AUTO: 0.4 %
LYMPHOCYTES # BLD AUTO: 1.83 X10(3)/MCL (ref 0.6–4.6)
LYMPHOCYTES NFR BLD AUTO: 16.3 %
MAGNESIUM SERPL-MCNC: 1.9 MG/DL (ref 1.6–2.6)
MCH RBC QN AUTO: 28.6 PG (ref 27–31)
MCHC RBC AUTO-ENTMCNC: 32.6 G/DL (ref 33–36)
MCV RBC AUTO: 87.7 FL (ref 80–94)
MONOCYTES # BLD AUTO: 0.87 X10(3)/MCL (ref 0.1–1.3)
MONOCYTES NFR BLD AUTO: 7.8 %
NEUTROPHILS # BLD AUTO: 8.44 X10(3)/MCL (ref 2.1–9.2)
NEUTROPHILS NFR BLD AUTO: 75.2 %
NRBC BLD AUTO-RTO: 0 %
PHOSPHATE SERPL-MCNC: 3.6 MG/DL (ref 2.3–4.7)
PLATELET # BLD AUTO: 345 X10(3)/MCL (ref 130–400)
PMV BLD AUTO: 9.6 FL (ref 7.4–10.4)
POCT GLUCOSE: 102 MG/DL (ref 70–110)
POCT GLUCOSE: 112 MG/DL (ref 70–110)
POCT GLUCOSE: 154 MG/DL (ref 70–110)
POTASSIUM SERPL-SCNC: 4.1 MMOL/L (ref 3.5–5.1)
PROT SERPL-MCNC: 6.5 GM/DL (ref 6.4–8.3)
RBC # BLD AUTO: 4.16 X10(6)/MCL (ref 4.7–6.1)
SODIUM SERPL-SCNC: 136 MMOL/L (ref 136–145)
WBC # BLD AUTO: 11.22 X10(3)/MCL (ref 4.5–11.5)

## 2025-01-28 PROCEDURE — 97166 OT EVAL MOD COMPLEX 45 MIN: CPT

## 2025-01-28 PROCEDURE — 83735 ASSAY OF MAGNESIUM: CPT

## 2025-01-28 PROCEDURE — 36415 COLL VENOUS BLD VENIPUNCTURE: CPT

## 2025-01-28 PROCEDURE — 25000003 PHARM REV CODE 250

## 2025-01-28 PROCEDURE — 94799 UNLISTED PULMONARY SVC/PX: CPT

## 2025-01-28 PROCEDURE — 99900035 HC TECH TIME PER 15 MIN (STAT)

## 2025-01-28 PROCEDURE — 63600175 PHARM REV CODE 636 W HCPCS

## 2025-01-28 PROCEDURE — 80053 COMPREHEN METABOLIC PANEL: CPT

## 2025-01-28 PROCEDURE — 97162 PT EVAL MOD COMPLEX 30 MIN: CPT

## 2025-01-28 PROCEDURE — 11000001 HC ACUTE MED/SURG PRIVATE ROOM

## 2025-01-28 PROCEDURE — 84100 ASSAY OF PHOSPHORUS: CPT

## 2025-01-28 PROCEDURE — 94761 N-INVAS EAR/PLS OXIMETRY MLT: CPT

## 2025-01-28 PROCEDURE — 97530 THERAPEUTIC ACTIVITIES: CPT

## 2025-01-28 PROCEDURE — 85025 COMPLETE CBC W/AUTO DIFF WBC: CPT

## 2025-01-28 RX ORDER — AMOXICILLIN AND CLAVULANATE POTASSIUM 875; 125 MG/1; MG/1
1 TABLET, FILM COATED ORAL EVERY 12 HOURS
Status: DISCONTINUED | OUTPATIENT
Start: 2025-01-28 | End: 2025-01-29 | Stop reason: HOSPADM

## 2025-01-28 RX ADMIN — PIPERACILLIN SODIUM AND TAZOBACTAM SODIUM 4.5 G: 4; .5 INJECTION, POWDER, LYOPHILIZED, FOR SOLUTION INTRAVENOUS at 01:01

## 2025-01-28 RX ADMIN — OXYCODONE 10 MG: 5 TABLET ORAL at 01:01

## 2025-01-28 RX ADMIN — ATORVASTATIN CALCIUM 40 MG: 40 TABLET, FILM COATED ORAL at 09:01

## 2025-01-28 RX ADMIN — PANTOPRAZOLE SODIUM 40 MG: 40 TABLET, DELAYED RELEASE ORAL at 08:01

## 2025-01-28 RX ADMIN — OXYCODONE HYDROCHLORIDE 5 MG: 5 TABLET ORAL at 09:01

## 2025-01-28 RX ADMIN — AMOXICILLIN AND CLAVULANATE POTASSIUM 1 TABLET: 875; 125 TABLET, COATED ORAL at 09:01

## 2025-01-28 RX ADMIN — AMOXICILLIN AND CLAVULANATE POTASSIUM 1 TABLET: 875; 125 TABLET, COATED ORAL at 08:01

## 2025-01-28 RX ADMIN — OXYCODONE 10 MG: 5 TABLET ORAL at 11:01

## 2025-01-28 RX ADMIN — ONDANSETRON 4 MG: 2 INJECTION INTRAMUSCULAR; INTRAVENOUS at 12:01

## 2025-01-28 RX ADMIN — SODIUM CHLORIDE, POTASSIUM CHLORIDE, SODIUM LACTATE AND CALCIUM CHLORIDE 1000 ML: 600; 310; 30; 20 INJECTION, SOLUTION INTRAVENOUS at 06:01

## 2025-01-28 RX ADMIN — SODIUM CHLORIDE, POTASSIUM CHLORIDE, SODIUM LACTATE AND CALCIUM CHLORIDE: 600; 310; 30; 20 INJECTION, SOLUTION INTRAVENOUS at 07:01

## 2025-01-28 RX ADMIN — OXYCODONE 10 MG: 5 TABLET ORAL at 07:01

## 2025-01-28 RX ADMIN — SODIUM CHLORIDE, POTASSIUM CHLORIDE, SODIUM LACTATE AND CALCIUM CHLORIDE: 600; 310; 30; 20 INJECTION, SOLUTION INTRAVENOUS at 03:01

## 2025-01-28 NOTE — PLAN OF CARE
Patient will increase functional independence with mobility by performin. Supine to sit with Stand-by Assistance  2. Sit to supine with Stand-by Assistance  3. Sit to stand transfer with Stand-by Assistance  4. Gait  x 130 feet with Stand-by Assistance using LRAD.

## 2025-01-28 NOTE — PT/OT/SLP EVAL
Physical Therapy Evaluation    Patient Name:  Chadwick Lorenzo   MRN:  [id number]    Recommendations:     Therapy Intensity Recommendations at Discharge: Low Intensity Therapy  Discharge Equipment Recommendations:  (TBD pending progress)   Equipment to be obtained for discharge: TBD pending progress.  Barriers to discharge: decreased endurance    Assessment:     Chadwick Lorenzo is a 59 y.o. male admitted with a medical diagnosis of Acute cholecystitis.  1. Acute cholecystitis    2. Colicky epigastric pain       Patient Active Problem List   Diagnosis    Nocturia    Fatigue    IGT (impaired glucose tolerance)    Asthma    Low testosterone    Mixed diabetic hyperlipidemia associated with type 2 diabetes mellitus    Type 2 diabetes mellitus with hyperglycemia, without long-term current use of insulin    Nonischemic cardiomyopathy    BMI 38.0-38.9,adult    Screen for colon cancer    FH: colon cancer in relative diagnosed at >50 years old    Hx of non anemic vitamin B12 deficiency    Immunization due    Thunderclap headache    Headache    Migraine with aura    Mild intermittent asthma    Class 2 severe obesity due to excess calories with serious comorbidity and body mass index (BMI) of 38.0 to 38.9 in adult    Seasonal allergies    Severe acute respiratory syndrome coronavirus 2 (SARS-CoV-2) vaccination not indicated    Primary hypertension    Mixed hyperlipidemia    Vitamin D deficiency    Hypertension associated with type 2 diabetes mellitus    Epigastric abdominal pain    Acute cholecystitis      He presents with the following impairments/functional limitations:  weakness, impaired endurance, impaired functional mobility, gait instability, impaired balance, pain.    Rehab Prognosis: Good.    Patient would benefit from continued skilled acute PT services to: address above listed impairments/functional limitations; receive patient/caregiver education; reduce fall risk; and maximize independency/safety with functional  mobility.    Recent Surgery: Procedure(s) (LRB):  CHOLECYSTECTOMY, LAPAROSCOPIC (N/A)  CHOLECYSTECTOMY 1 Day Post-Op    Plan:     During this hospitalization, patient to be seen   to address the identified impairments/functional limitations via gait training, therapeutic activities, therapeutic exercises and progress toward the established goals.    Plan of Care Expires:  02/27/25    Subjective     Communicated with patient's nurse  prior to session.    Patient agreeable to participate in evaluation.     Chief Complaint: pain  Patient/Family Comments/goals: to walk  Pain/Comfort:  Pain Rating 1: 5/10  Location 1: abdomen  Pain Addressed 1: Nurse notified  Pain Rating Post-Intervention 1: 8/10    Patients cultural, spiritual, Taoist conflicts given the current situation: no    Social History  Living Environment: Patient lives with spouse in a single level home, with no steps, with tub-shower combo.  Functional Level: Prior to admission patient ambulated without assistive device and was independent in ADL's.  Equipment Used at Home: none  Equipment owned (not currently used): none.  Assistance Upon Discharge: family.        Objective:     Patient found supine in bed and with HOB elevated with peripheral IV, LISA drain  upon PT entry to room.    General Precautions: Standard, fall   Orthopedic Precautions:N/A   Braces:  N/A  Respiratory Status: room air    Vitals   At Rest (pre-session)  BP  99/63   HR  76   O2 Sat %  95      With Activity (post-session)  BP  104/59   HR  58   O2 Sat %       Exams:  Orientation: Patient is oriented to person, place, time, situation  Commands: Patient follows commands consistently  BILAT UE ROM/strength - defer to OT - see OT note for details  RLE ROM: WFL  RLE Strength: WFL  LLE ROM: WFL  LLE Strength: WFL    Functional Mobility:    Bed Mobility:  Supine to Sit: minimum assistance  Sit to Supine: minimum assistance    Transfers:  Sit to Stand: contact guard assistance with no  assistive device  Stand to Sit: contact guard assistance with no assistive device    Gait:  NT    Other Mobility:  Therapeutic Activities performed:        -sitting balance activities:              repositioning at edge of bed            reaching:                   -forward       -standing balance activities:              side steps    Balance:  Sit  Static: NORMAL: No deviations seen in posture held statically  Dynamic: NORMAL: No deviations seen in posture held dynamically  Stand  Static: NORMAL: No deviations seen in posture held statically  Dynamic: FAIR: Needs CONTACT GUARD during gait    Additional Treatment Session  N/A    Patient left supine in bed and with HOB elevated with all lines intact, call button in reach, tray table at bedside, and patient's nurse notified.        Education     Patient educated on the importance of early mobility to prevent functional decline during hospital stay.  Patient educated on and assisted with functional mobility as noted above.  Patient educated on PT Plan of Care and role of PT in acute care.  Patient was instructed to utilize staff assistance for mobility/transfers.  White board updated regarding patient's safest level of mobility with staff assistance    Goals     Multidisciplinary Problems       Physical Therapy Goals          Problem: Physical Therapy                      History:     Past Medical History:   Diagnosis Date    CHF (congestive heart failure)     Diabetes mellitus, type 2     Hyperlipidemia     IGT (impaired glucose tolerance)     Kidney stones     Low testosterone in male     Migraine     Morbid obesity     Vitamin D deficiency      Past Surgical History:   Procedure Laterality Date    ANGIOGRAM, CORONARY, WITH LEFT HEART CATHETERIZATION N/A 08/01/2022    Procedure: Angiogram, Coronary, with Left Heart Cath;  Surgeon: Lev Manuel MD;  Location: [facility] CATH LAB;  Service: Cardiology;  Laterality: N/A;    CHOLECYSTECTOMY  1/27/2025    Procedure:  CHOLECYSTECTOMY;  Surgeon: Ayden Escobedo Jr., MD;  Location: [facility];  Service: General;;    kidney stones removal      LAPAROSCOPIC CHOLECYSTECTOMY N/A 1/27/2025    Procedure: CHOLECYSTECTOMY, LAPAROSCOPIC;  Surgeon: Ayden Escobedo Jr., MD;  Location: [facility];  Service: General;  Laterality: N/A;    rotator cuff surgery       Time Tracking:     PT Received On: 01/28/25  PT Start Time: 0836     PT Stop Time: 0854  PT Total Time (min): 18 min     Billable Minutes: Evaluation , moderate complexity    01/28/2025

## 2025-01-28 NOTE — CONSULTS
Inpatient Nutrition Evaluation    Admit Date: 1/27/2025   Total duration of encounter: 1 day   Patient Age: 59 y.o.    Nutrition Recommendation/Prescription     Low Na, carb consistent (75 gm/meal) diet  Monitor Weight Weekly     Nutrition Assessment     Chart Review    Reason Seen: physician consult for HF    Malnutrition Screening Tool Results   Have you recently lost weight without trying?: No  Have you been eating poorly because of a decreased appetite?: No   MST Score: 0   Diagnosis:  Acute cholecystitis s/p cholecystectomy 1/27    Relevant Medical History: HLD, DM, HFrEF, Migraines, Kidney stones, Obesity    Scheduled Medications:  amoxicillin-clavulanate 875-125mg, 1 tablet, Q12H  atorvastatin, 40 mg, Daily  metoprolol succinate, 25 mg, Daily  pantoprazole, 40 mg, QHS    Continuous Infusions:  lactated ringers, Last Rate: 50 mL/hr at 01/28/25 0634    PRN Medications:   Current Facility-Administered Medications:     acetaminophen, 650 mg, Oral, Q8H PRN    albuterol, 2 puff, Inhalation, Daily PRN    dextrose 50%, 12.5 g, Intravenous, PRN    glucagon (human recombinant), 1 mg, Intramuscular, PRN    insulin aspart U-100, 0-10 Units, Subcutaneous, Q6H PRN    morphine, 4 mg, Intravenous, Q2H PRN    ondansetron, 4 mg, Intravenous, Q12H PRN    oxyCODONE, 10 mg, Oral, Q4H PRN    oxyCODONE, 5 mg, Oral, Q4H PRN    sodium chloride 0.9%, 10 mL, Intravenous, PRN    Recent Labs   Lab 01/22/25  0417 01/26/25  2347 01/27/25  1023 01/27/25  1607 01/28/25  0522 01/28/25  0523    138  --  139  --  136   K 4.1 4.3  --  3.9  --  4.1   CALCIUM 9.9 10.3*  --  10.0  --  9.3   PHOS  --   --   --  3.2  --  3.6   MG  --   --   --  1.90  --  1.90   CO2 22 24  --  20*  --  21*   BUN 10.3 11.0  --  10.0  --  14.6   CREATININE 0.81 0.86  --  0.85  --  0.99   EGFRNORACEVR >60 >60  --  >60  --  >60   GLUCOSE 131* 108*  --  134*  --  115*   BILITOT 0.4 0.3  --  0.6  --  0.7   ALKPHOS 76 98  --  81  --  75   ALT 19 24  --  45  --  69*  "  AST 32 24  --  48*  --  76*   ALBUMIN 3.8 3.4*  --  3.4*  --  3.1*   HGBA1C  --   --  5.5  --   --   --    LIPASE 15 25  --   --   --   --    WBC 5.33 6.74  --  7.65 11.22  --    HGB 12.9* 12.8*  --  13.1* 11.9*  --    HCT 39.7* 40.2*  --  40.9* 36.5*  --      Nutrition Orders:  Diet Soft & Bite Sized (IDDSI Level 6) Diabetic; 2000 Calories (up to 75 gm per meal)  Dietary nutrition supplements TID; Boost Original Nutritional Drink - Chocolate    Appetite/Oral Intake: fair/50-75% of meals  Factors Affecting Nutritional Intake: abdominal pain  Social Needs Impacting Access to Food: none identified  Food/Spiritism/Cultural Preferences: none reported  Food Allergies: no known food allergies  Last Bowel Movement: 25  Wound(s):  abdominal incision    Comments    25 -- Pt with 1 day h/o abdominal pain, eating normally prior to onset of symptoms; tolerating CL diet this am s/p surgery; denies n/v or abdominal pain this am; denies weight loss; Pt denies need for diet education for HF    Anthropometrics    Height: 5' 8" (172.7 cm), Height Method: Stated  Last Weight: 120.1 kg (264 lb 12.8 oz) (25 0524), Weight Method: Bed Scale  BMI (Calculated): 40.3  BMI Classification: obese grade III (BMI >/=40)        Ideal Body Weight (IBW), Male: 154 lb     % Ideal Body Weight, Male (lb): 164.63 %                 Usual Body Weight (UBW), k.4 kg  % Usual Body Weight: 106.14     Usual Weight Provided By: EMR weight history and patient denies unintentional weight loss    Wt Readings from Last 5 Encounters:   25 120.1 kg (264 lb 12.8 oz)   25 115.7 kg (255 lb 1.2 oz)   25 112 kg (247 lb)   25 113.4 kg (250 lb)   24 113.4 kg (250 lb)     Weight Change(s) Since Admission:   Wt Readings from Last 1 Encounters:   25 0524 120.1 kg (264 lb 12.8 oz)   25 0720 115 kg (253 lb 8.5 oz)   Admit Weight: 115 kg (253 lb 8.5 oz) (25 07), Weight Method: Standard Scale    Patient " Education     Not applicable.    Nutrition Goals & Monitoring     Dietitian will monitor: food and beverage intake, weight, and gastrointestinal profile  Discharge planning: resume home regimen  Nutrition Risk/Follow-Up: low (follow-up in 5-7 days)  Patients assigned 'low nutrition risk' status do not qualify for a full nutritional assessment but will be monitored and re-evaluated in a 5-7 day time period. Please consult if re-evaluation needed sooner.

## 2025-01-28 NOTE — PROGRESS NOTES
"  [facility]  General Surgery - Gold Team  Progress Note  Admit Date: 1/27/2025  HD#1  POD#1 Day Post-Op    Subjective:   Interval history:  Afebrile. A run of bradycardia yesterday evening corrected without intervention.    Patient states his pain is well controlled, 5/10. Notes pain mostly whenever he coughs or has hiccups. Has no issues tolerating clear liquid diet. Denies any fever, chills, nausea, or vomiting. Drain output 25 overnight SS     Objective:     VITAL SIGNS: 24 HR MIN & MAX LAST   Temp  Min: 96.1 °F (35.6 °C)  Max: 98.1 °F (36.7 °C)  98.1 °F (36.7 °C)   BP  Min: 67/41  Max: 141/91  101/62    Pulse  Min: 46  Max: 76  76    Resp  Min: 15  Max: 20  18    SpO2  Min: 92 %  Max: 100 %  (!) 92 %      HT: 5' 8" (172.7 cm)  WT: 120.1 kg (264 lb 12.8 oz)  BMI: 40.3     Intake/output:    Intake/Output Summary (Last 24 hours) at 1/28/2025 0750  Last data filed at 1/28/2025 0552  Gross per 24 hour   Intake 2062.49 ml   Output 120 ml   Net 1942.49 ml        Physical examination:  Gen: NAD, AAOx3, answering questions appropriately  HEENT: mucous membranes moist  CV: RR  Resp: clear to auscultations bilaterally  Abd: S/ND/ Appropriately tender, drain and bandages are c/d/i  Ext: moving all extremities spontaneously and purposefully    Labs:  Recent Labs     01/26/25  2347 01/27/25  1607 01/28/25  0522 01/28/25  0523   WBC 6.74 7.65 11.22  --    HGB 12.8* 13.1* 11.9*  --    HCT 40.2* 40.9* 36.5*  --     329 345  --     139  --  136   K 4.3 3.9  --  4.1    109*  --  108*   CO2 24 20*  --  21*   BUN 11.0 10.0  --  14.6   CREATININE 0.86 0.85  --  0.99   BILITOT 0.3 0.6  --  0.7   AST 24 48*  --  76*   ALT 24 45  --  69*   ALKPHOS 98 81  --  75   CALCIUM 10.3* 10.0  --  9.3   ALBUMIN 3.4* 3.4*  --  3.1*   MG  --  1.90  --  1.90   PHOS  --  3.2  --  3.6     Imaging:  US Abdomen Limited   Final Result      Cholelithiasis with gallbladder wall thickening and positive sonographic " Gonzalez sign, suggestive of acute cholecystitis.         Electronically signed by: Mita Mackey   Date:    01/27/2025   Time:    08:49         I have reviewed all pertinent imaging results/findings within the past 24 hours.    Micro/Path:  1/27/25 Specimen: in process    Assessment & Plan:   Chadwick Lorenzo is a 59 y.o male with a PMHx of HLD, DMT2, HFrEF (60% Jan 2023), migraines, kidney stones, and obesity. Presented to the ED 1/26/25 for mid epigastric pain. CTAP findings suggest acute cholecystitis with GB wall thickening and inflammatory strandings without fluid. U/S shows cholelithiasis w/ GB wall thickening suggestive of acute cholecystitis. S/p open gallbladder removal 1/27/2025. POD1, patient recovering well.    - continue to observe drainage from LISA  - start trial of PO Augmentin for 1 week  - plan to remove staples after 2 weeks post-op  - MMPC  - Diet: Clear liquid diet, advance today  - Anti-emesis: ondansetron  - return to clinic next week for drain management    DVT ppx: subcutaneous heparin    Dispo: possibly today, will pm check    Waqar Roca  [employer] General Surgery, MS3     Reviewed and edited appropriately.     Abraham Schmidt MD  [employer] General Surgery PGY1

## 2025-01-28 NOTE — MEDICAL/APP STUDENT
"  [facility]  General Surgery - Gold Team  Progress Note  Admit Date: 1/27/2025  HD#1  POD#1 Day Post-Op    Subjective:   Interval history:  Afebrile. A run of bradycardia yesterday evening corrected without intervention.    Patient states his pain is well controlled, 5/10. Notes pain mostly whenever he coughs or has hiccups. Has no issues with liquid diet. Denies any fever, chills, nausea, or vomiting.     Objective:     VITAL SIGNS: 24 HR MIN & MAX LAST   Temp  Min: 96.1 °F (35.6 °C)  Max: 98.1 °F (36.7 °C)  98.1 °F (36.7 °C)   BP  Min: 67/41  Max: 141/91  101/62    Pulse  Min: 46  Max: 74  74    Resp  Min: 15  Max: 20  18    SpO2  Min: 94 %  Max: 100 %  (!) 94 %      HT: 5' 8" (172.7 cm)  WT: 115 kg (253 lb 8.5 oz)  BMI: 38.6     Intake/output:    Intake/Output Summary (Last 24 hours) at 1/28/2025 0559  Last data filed at 1/28/2025 0552  Gross per 24 hour   Intake 2062.49 ml   Output 120 ml   Net 1942.49 ml        Physical examination:  Gen: NAD, AAOx3, answering questions appropriately  HEENT: mucous membranes moist  CV: RR  Resp: clear to auscultations bilaterally  Abd: S/ND/NT, drain and bandages are c/d/i  Ext: moving all extremities spontaneously and purposefully      Labs:  Recent Labs     01/26/25  2347 01/27/25  1607 01/28/25  0522 01/28/25  0523   WBC 6.74 7.65 11.22  --    HGB 12.8* 13.1* 11.9*  --    HCT 40.2* 40.9* 36.5*  --     329 345  --     139  --  136   K 4.3 3.9  --  4.1    109*  --  108*   CO2 24 20*  --  21*   BUN 11.0 10.0  --  14.6   CREATININE 0.86 0.85  --  0.99   BILITOT 0.3 0.6  --  0.7   AST 24 48*  --  76*   ALT 24 45  --  69*   ALKPHOS 98 81  --  75   CALCIUM 10.3* 10.0  --  9.3   ALBUMIN 3.4* 3.4*  --  3.1*   MG  --  1.90  --  1.90   PHOS  --  3.2  --  3.6       Imaging:  US Abdomen Limited   Final Result      Cholelithiasis with gallbladder wall thickening and positive sonographic Gonzalez sign, suggestive of acute cholecystitis.       "   Electronically signed by: Mita Mackey   Date:    01/27/2025   Time:    08:49         I have reviewed all pertinent imaging results/findings within the past 24 hours.    Micro/Path:  1/27/25 Specimen: in process    Assessment & Plan:   Chadwick Lorenzo is a 59 y.o male with a PMHx of HLD, DMT2, HFrEF (60% Jan 2023), migraines, kidney stones, and obesity. Presented to the ED 1/26/25 for mid epigastric pain. CTAP findings suggest acute cholecystitis with GB wall thickening and inflammatory strandings without fluid. U/S shows cholelithiasis w/ GB wall thickening suggestive of acute cholecystitis. S/p open gallbladder removal 1/27/2025. Post op day 1, patient recovering well.    - continue to observe drainage from LISA  - start trial of  Augmentin for 1 week  - plan to remove staples after 2 weeks post-op  - MMPC  - Diet: Clear liquid diet, advance as tolerated  - Anti-emesis: ondansetron  - return to clinic in 2 weeks post-op      DVT ppx: subcutaneous heparin    Dispo: pain control and increased mobility    Waqar Roca  [employer] General Surgery, MS3

## 2025-01-28 NOTE — PLAN OF CARE
Problem: Adult Inpatient Plan of Care  Goal: Plan of Care Review  1/27/2025 2124 by Mary Carmen Parsons, RN  Outcome: Progressing  1/27/2025 2124 by Mary Carmen Parsons, RN  Outcome: Progressing  Goal: Optimal Comfort and Wellbeing  Outcome: Progressing     Problem: Wound  Goal: Improved Oral Intake  Outcome: Progressing  Goal: Optimal Pain Control and Function  Outcome: Progressing

## 2025-01-28 NOTE — PT/OT/SLP PROGRESS
"Occupational Therapy      Patient Name:  Chadwick Lorenzo   MRN:  [id number]    OT attempted at 1026 pt declined at this time. Pt reports "I tried with that other therapist this morning and I got dizzy and weak and I couldn't walk. I had to lay back down and I got pain medicine. I just feel asleep. I'll try later."    1/28/2025  "

## 2025-01-28 NOTE — PLAN OF CARE
01/28/25 1503   Discharge Assessment   Assessment Type Discharge Planning Assessment   Confirmed/corrected address, phone number and insurance Yes   Confirmed Demographics Correct on Facesheet   Source of Information patient   When was your last doctors appointment?   (PCP: Clyde Hyatt)   Does patient/caregiver understand observation status   (Inpatient)   Communicated NIC with patient/caregiver Date not available/Unable to determine   Reason For Admission K81.0 Acute cholecystitis  R10.13 Colicky epigastric pain   People in Home spouse;child(uzma), adult   Facility Arrived From: Home   Do you expect to return to your current living situation? Yes   Do you have help at home or someone to help you manage your care at home? Yes   Who are your caregiver(s) and their phone number(s)? Soco Lorenzo, wife, P: [phone]   Prior to hospitilization cognitive status: Alert/Oriented;No Deficits   Current cognitive status: Alert/Oriented;No Deficits   Walking or Climbing Stairs Difficulty no   Dressing/Bathing Difficulty no   Home Accessibility wheelchair accessible   Home Layout Able to live on 1st floor   Equipment Currently Used at Home none   Readmission within 30 days? No   Patient currently being followed by outpatient case management? No   Do you currently have service(s) that help you manage your care at home? No   Do you take prescription medications? Yes  ([facility])   Do you have prescription coverage? Yes   Coverage BCBS   Do you have any problems affording any of your prescribed medications? No   Is the patient taking medications as prescribed? yes   Who is going to help you get home at discharge? Soco Lorenzo, wife, P: [phone]   How do you get to doctors appointments? car, drives self;family or friend will provide   Are you on dialysis? No   Do you take coumadin? 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

## 2025-01-28 NOTE — PT/OT/SLP EVAL
"Occupational Therapy   Evaluation    Name: Chadwick Lorenzo  MRN: [id number]  ED due to: mid epigastric pain  Admitting Diagnosis: Acute cholecystitis s/p laparoscopic converted to open cholecystitis with drain placement   Recent Surgery: Procedure(s) (LRB):  CHOLECYSTECTOMY, LAPAROSCOPIC (N/A)  CHOLECYSTECTOMY 1 Day Post-Op  PMHx of HLD, DMT2, HFrEF (60% Jan 2023), migraines, kidney stones, and obesity.     Recommendations:     Discharge Recommendations:  (pending progress, poor tolerance to evaluation, anticipate will be low intensity)  Discharge Equipment Recommendations:   (pending progress)  Barriers to discharge:  Other (Comment) (poor tolerance for evaluation, not able to safely dc home at this time)    Assessment:     Chadwick Lorenzo is a 59 y.o. male with a medical diagnosis of Acute cholecystitis s/p laparoscopic converted to open cholecystitis with drain placement   He presents with poor tolerance due to c/o pain and c/o light headed with sitting, pt unable to tolerate mobilizing OOB, decreased BP with mobilizing. Performance deficits affecting function: impaired self care skills, impaired balance, impaired functional mobility, pain.      Rehab Prognosis: Good; patient would benefit from acute skilled OT services to address these deficits and reach maximum level of function.       Plan:     Patient to be seen 3 x/week to address the above listed problems via self-care/home management, therapeutic exercises, therapeutic activities, neuromuscular re-education  Plan of Care Expires:    Plan of Care Reviewed with: patient, spouse    Subjective     Chief Complaint: c/o pain in abdomen/surgical site, c/o "light headed" with standing  Patient/Family Comments/goals: to return home PLOF    Occupational Profile:  Living Environment: pt resides at home with spouse, no steps to enter, walk in shower  Previous level of function: I with ADL tasks and ambulatory without AD  Roles and Routines: drives, employed  Equipment " Used at Home: none  Assistance upon Discharge: spouse has to return to work in another day, reports family will assist as needed    Pain/Comfort:  Pain Rating 1: 6/10  Location 1:  (abdomen/surgical site)  Pain Addressed 1: Reposition, Cessation of Activity  Pain Rating Post-Intervention 1: 9/10    Patients cultural, spiritual, [employer]ian conflicts given the current situation:      Objective:     Communicated with: nurse prior to session.  Patient found HOB elevated with peripheral IV, SCD, LISA drain upon OT entry to room.    General Precautions: Standard,   LISA drain  Orthopedic Precautions:    Braces:    Respiratory Status: Room air  Start of session 103/67  Sitting EOB 86/55 HR 66  Pt t/f sit>stand, stood for zoraida 15 seconds then c/o light headed unable to obtain BP in standing  Returned to supine 98/66 HR 33   End of session in supine 105/68 HR 57, O2 sat 96%  Nursing notified of above vitals  Occupational Performance:    Bed Mobility:    Patient completed Rolling/Turning to Left with  supervision  Patient completed Scooting/Bridging with stand by assistance and anterior, posterior and lateral seated EOB, able to scoot up to HOB in semi supine  Patient completed Supine to Sit with contact guard assistance and vc's and tactile cues to increase trunk flexion due to pt leaning posteriorly due to abdominal pain  Patient completed Sit to Supine with moderate assistance and c/o pain    Functional Mobility/Transfers:  Patient completed Sit <> Stand Transfer with contact guard assistance  with  no assistive device and LUE on bed rail, pt c/o pain   Functional Mobility: unable to tolerate OOB t/f due to pain and not feeling well/light headed  Pt tolerated static standing zoraida 15 seconds then c/o light headed  and abdominal pain    Activities of Daily Living:  Lower Body Dressing: total assistance .  Toileting: mod A with urinal, no BM since surgery    Poor tolerance to sitting EOB due to pain, unable to engage in ADL  assessment    Cognitive/Visual Perceptual:  Cognitive/Psychosocial Skills:     -       Oriented to: Person, Place, Time, and Situation   -       Follows Commands/attention:Follows multistep  commands  -       Safety awareness/insight to disability: intact   -       Mood/Affect/Coping skills/emotional control: Appropriate to situation, Cooperative, and some flat affect although pt in pain    Physical Exam:  Balance: -       static sitting balance CGA due to posterior leaning due to abdominal pain, static standing balance CGA  Upper Extremity Range of Motion:     -       Right Upper Extremity: WNL  -       Left Upper Extremity: WNL  Upper Extremity Strength:    -       Right Upper Extremity: WFL  -       Left Upper Extremity: WFL    AMPAC 6 Click ADL:  [facility] Total Score:      Treatment & Education:  Education on OT POC, increasing OOB ax    Patient left HOB elevated with all lines intact, call button in reach, nurse notified, and spouse present    GOALS:   Multidisciplinary Problems       Occupational Therapy Goals          Problem: Occupational Therapy    Goal Priority Disciplines Outcome Interventions   Occupational Therapy Goal     OT, PT/OT Progressing    Description: Pt will complete functional transfers with least restrictive device mod I  Pt will ambulate short distances for ADL tasks in room with least restrictive device mod I  Pt will complete toileting tasks min A  Pt will complete grooming in standing at sink mod I  Pt will complete LE dressing min A                       DME Justifications:  Pending progress, recommendation to come at next treatment session    History:     Past Medical History:   Diagnosis Date    CHF (congestive heart failure)     Diabetes mellitus, type 2     Hyperlipidemia     IGT (impaired glucose tolerance)     Kidney stones     Low testosterone in male     Migraine     Morbid obesity     Vitamin D deficiency          Past Surgical History:   Procedure Laterality Date    ANGIOGRAM,  CORONARY, WITH LEFT HEART CATHETERIZATION N/A 08/01/2022    Procedure: Angiogram, Coronary, with Left Heart Cath;  Surgeon: Lev Manuel MD;  Location: [facility] CATH LAB;  Service: Cardiology;  Laterality: N/A;    CHOLECYSTECTOMY  1/27/2025    Procedure: CHOLECYSTECTOMY;  Surgeon: Ayden Escobedo Jr., MD;  Location: [facility];  Service: General;;    kidney stones removal      LAPAROSCOPIC CHOLECYSTECTOMY N/A 1/27/2025    Procedure: CHOLECYSTECTOMY, LAPAROSCOPIC;  Surgeon: Ayden Escobedo Jr., MD;  Location: [facility];  Service: General;  Laterality: N/A;    rotator cuff surgery         Time Tracking:     OT Date of Treatment: 01/28/25  OT Start Time: 1216  OT Stop Time: 1254  OT Total Time (min): 38 min    Billable Minutes:Evaluation mod comp  Therapeutic Activity 1 1/28/2025

## 2025-01-28 NOTE — PLAN OF CARE
Problem: Occupational Therapy  Goal: Occupational Therapy Goal  Description:   Pt will complete functional transfers with least restrictive device mod I  Pt will ambulate short distances for ADL tasks in room with least restrictive device mod I  Pt will complete toileting tasks min A  Pt will complete grooming in standing at sink mod I  Pt will complete LE dressing min A  Outcome: Progressing

## 2025-01-29 VITALS
BODY MASS INDEX: 40.83 KG/M2 | WEIGHT: 269.38 LBS | HEIGHT: 68 IN | SYSTOLIC BLOOD PRESSURE: 113 MMHG | RESPIRATION RATE: 18 BRPM | DIASTOLIC BLOOD PRESSURE: 64 MMHG | HEART RATE: 70 BPM | TEMPERATURE: 98 F | OXYGEN SATURATION: 96 %

## 2025-01-29 PROBLEM — K81.0 ACUTE CHOLECYSTITIS: Status: RESOLVED | Noted: 2025-01-27 | Resolved: 2025-01-29

## 2025-01-29 LAB
ALBUMIN SERPL-MCNC: 3 G/DL (ref 3.5–5)
ALBUMIN/GLOB SERPL: 0.8 RATIO (ref 1.1–2)
ALP SERPL-CCNC: 75 UNIT/L (ref 40–150)
ALT SERPL-CCNC: 106 UNIT/L (ref 0–55)
ANION GAP SERPL CALC-SCNC: 4 MEQ/L
AST SERPL-CCNC: 95 UNIT/L (ref 5–34)
BASOPHILS # BLD AUTO: 0.04 X10(3)/MCL
BASOPHILS NFR BLD AUTO: 0.5 %
BILIRUB SERPL-MCNC: 0.6 MG/DL
BUN SERPL-MCNC: 14.5 MG/DL (ref 8.4–25.7)
CALCIUM SERPL-MCNC: 9.6 MG/DL (ref 8.4–10.2)
CHLORIDE SERPL-SCNC: 109 MMOL/L (ref 98–107)
CO2 SERPL-SCNC: 24 MMOL/L (ref 22–29)
CREAT SERPL-MCNC: 0.98 MG/DL (ref 0.72–1.25)
CREAT/UREA NIT SERPL: 15
EOSINOPHIL # BLD AUTO: 0.41 X10(3)/MCL (ref 0–0.9)
EOSINOPHIL NFR BLD AUTO: 4.7 %
ERYTHROCYTE [DISTWIDTH] IN BLOOD BY AUTOMATED COUNT: 13.4 % (ref 11.5–17)
ESTROGEN SERPL-MCNC: NORMAL PG/ML
GFR SERPLBLD CREATININE-BSD FMLA CKD-EPI: >60 ML/MIN/1.73/M2
GLOBULIN SER-MCNC: 3.7 GM/DL (ref 2.4–3.5)
GLUCOSE SERPL-MCNC: 102 MG/DL (ref 74–100)
HCT VFR BLD AUTO: 36 % (ref 42–52)
HGB BLD-MCNC: 11.5 G/DL (ref 14–18)
IMM GRANULOCYTES # BLD AUTO: 0.02 X10(3)/MCL (ref 0–0.04)
IMM GRANULOCYTES NFR BLD AUTO: 0.2 %
INSULIN SERPL-ACNC: NORMAL U[IU]/ML
LAB AP CLINICAL INFORMATION: NORMAL
LAB AP GROSS DESCRIPTION: NORMAL
LAB AP REPORT FOOTNOTES: NORMAL
LYMPHOCYTES # BLD AUTO: 2.51 X10(3)/MCL (ref 0.6–4.6)
LYMPHOCYTES NFR BLD AUTO: 28.6 %
MAGNESIUM SERPL-MCNC: 2.2 MG/DL (ref 1.6–2.6)
MCH RBC QN AUTO: 28.2 PG (ref 27–31)
MCHC RBC AUTO-ENTMCNC: 31.9 G/DL (ref 33–36)
MCV RBC AUTO: 88.2 FL (ref 80–94)
MONOCYTES # BLD AUTO: 0.89 X10(3)/MCL (ref 0.1–1.3)
MONOCYTES NFR BLD AUTO: 10.1 %
NEUTROPHILS # BLD AUTO: 4.92 X10(3)/MCL (ref 2.1–9.2)
NEUTROPHILS NFR BLD AUTO: 55.9 %
NRBC BLD AUTO-RTO: 0 %
PHOSPHATE SERPL-MCNC: 2.8 MG/DL (ref 2.3–4.7)
PLATELET # BLD AUTO: 339 X10(3)/MCL (ref 130–400)
PMV BLD AUTO: 9.2 FL (ref 7.4–10.4)
POCT GLUCOSE: 111 MG/DL (ref 70–110)
POCT GLUCOSE: 125 MG/DL (ref 70–110)
POTASSIUM SERPL-SCNC: 4.2 MMOL/L (ref 3.5–5.1)
PROT SERPL-MCNC: 6.7 GM/DL (ref 6.4–8.3)
RBC # BLD AUTO: 4.08 X10(6)/MCL (ref 4.7–6.1)
SODIUM SERPL-SCNC: 137 MMOL/L (ref 136–145)
T3RU NFR SERPL: NORMAL %
WBC # BLD AUTO: 8.79 X10(3)/MCL (ref 4.5–11.5)

## 2025-01-29 PROCEDURE — 85025 COMPLETE CBC W/AUTO DIFF WBC: CPT

## 2025-01-29 PROCEDURE — 80053 COMPREHEN METABOLIC PANEL: CPT

## 2025-01-29 PROCEDURE — 25000003 PHARM REV CODE 250

## 2025-01-29 PROCEDURE — 83735 ASSAY OF MAGNESIUM: CPT

## 2025-01-29 PROCEDURE — 99900035 HC TECH TIME PER 15 MIN (STAT)

## 2025-01-29 PROCEDURE — 84100 ASSAY OF PHOSPHORUS: CPT

## 2025-01-29 PROCEDURE — 97530 THERAPEUTIC ACTIVITIES: CPT

## 2025-01-29 PROCEDURE — 97535 SELF CARE MNGMENT TRAINING: CPT

## 2025-01-29 PROCEDURE — 36415 COLL VENOUS BLD VENIPUNCTURE: CPT

## 2025-01-29 PROCEDURE — 94761 N-INVAS EAR/PLS OXIMETRY MLT: CPT

## 2025-01-29 RX ORDER — ACETAMINOPHEN 325 MG/1
650 TABLET ORAL EVERY 8 HOURS PRN
Qty: 30 TABLET | Refills: 0 | Status: SHIPPED | OUTPATIENT
Start: 2025-01-29

## 2025-01-29 RX ORDER — OXYCODONE HYDROCHLORIDE 10 MG/1
10 TABLET ORAL EVERY 4 HOURS PRN
Qty: 15 TABLET | Refills: 0 | Status: SHIPPED | OUTPATIENT
Start: 2025-01-29 | End: 2025-01-29

## 2025-01-29 RX ORDER — OXYCODONE HYDROCHLORIDE 10 MG/1
10 TABLET ORAL EVERY 4 HOURS PRN
Qty: 15 TABLET | Refills: 0 | Status: SHIPPED | OUTPATIENT
Start: 2025-01-29

## 2025-01-29 RX ADMIN — AMOXICILLIN AND CLAVULANATE POTASSIUM 1 TABLET: 875; 125 TABLET, COATED ORAL at 08:01

## 2025-01-29 RX ADMIN — METOPROLOL SUCCINATE 25 MG: 25 TABLET, EXTENDED RELEASE ORAL at 08:01

## 2025-01-29 RX ADMIN — OXYCODONE 10 MG: 5 TABLET ORAL at 04:01

## 2025-01-29 RX ADMIN — ATORVASTATIN CALCIUM 40 MG: 40 TABLET, FILM COATED ORAL at 08:01

## 2025-01-29 NOTE — DISCHARGE SUMMARY
[facility]  Discharge Summary    Admit Date: 1/27/2025  Discharge Date: 1/29/2025  Admitting Physician: Ayden Escobedo Jr.,*  Consulting Physicians(s): Ayden Escobedo Jr.    Admission HPI:   Chadwick Lorezno is a 59 y.o male with a PMHx of HLD, DMT2, HFrEF (60% on echo Jan 2023), migraines, kidney stones, and obesity. He is s/p angio coronary w/ left heart cath 2022. Patient had acute onset of mid epigastric pain 1/26/2025 at 10:30 pm. He has had similar episodes in the past postprandial. Patient initially thought pain was related to reflux, but did not subside after taking Nexium and water with baking soda. No alleviating factors. Patient presented to the ED and has been NPO since midnight. He denies any fever, chills, or vomiting.     Hospital Course:   Patient admitted for acute cholecystitis, patient brought to OR 1/27/25 for laparoscopic cholecystomy, upon evaluation laparoscopically of cholecystitis patient deemed unable to continue minimal invasively and converted to open cholecystectomy. Patient received open subtotal cholecystomy with drain placement. Patient recovery well post operatively and is ready for discharge on pod 2 with close clinic follow up for drain care.     Procedures Performed: Subtotal cholecystectomy open, agustin drain placed    Final Diagnoses:   Active Hospital Problems   No active problems to display.      Resolved Hospital Problems    Diagnosis Date Resolved POA    *Acute cholecystitis [K81.0] 01/29/2025 Yes     Discharge Condition: Stable    Disposition: Follow up in clinic next week for drain care    Follow-Up Plan: see above    Discharge Instructions:   Activity: No lifting over 20 lbs for 6 weeks  Diet: Regular  Wound Care: Keep C/D/I    Discharge Medications:  Current Discharge Medication List        START taking these medications    Details   acetaminophen (TYLENOL) 325 MG tablet Take 2 tablets (650 mg total) by mouth every 8 (eight) hours as needed for Pain.  Qty:  "30 tablet, Refills: 0      oxyCODONE (ROXICODONE) 10 mg Tab immediate release tablet Take 1 tablet (10 mg total) by mouth every 4 (four) hours as needed for Pain.  Qty: 15 tablet, Refills: 0    Comments: Quantity prescribed more than 7 day supply? Yes, quantity medically necessary  Associated Diagnoses: Acute cholecystitis           CONTINUE these medications which have NOT CHANGED    Details   pen needle, diabetic 33 gauge x 5/32" Ndle 365 Units by Misc.(Non-Drug; Combo Route) route once daily.  Qty: 365 each, Refills: 0    Associated Diagnoses: Diabetes mellitus due to underlying condition with hyperosmolarity without coma, without long-term current use of insulin      albuterol (PROVENTIL/VENTOLIN HFA) 90 mcg/actuation inhaler Inhale 2 puffs into the lungs daily as needed for Shortness of Breath.  Qty: 18 g, Refills: 0      aspirin (ECOTRIN) 81 MG EC tablet Take 1 tablet (81 mg total) by mouth once daily.  Qty: 90 tablet, Refills: 3    Associated Diagnoses: Nonischemic cardiomyopathy; Coronary artery disease involving native coronary artery of native heart without angina pectoris      atorvastatin (LIPITOR) 40 MG tablet Take 1 tablet (40 mg total) by mouth once daily.  Qty: 90 tablet, Refills: 3    Associated Diagnoses: Mixed hyperlipidemia; Type 2 diabetes mellitus with hyperglycemia, with long-term current use of insulin; Mixed diabetic hyperlipidemia associated with type 2 diabetes mellitus      baclofen (LIORESAL) 10 MG tablet Take 1 tablet (10 mg total) by mouth 3 (three) times daily as needed (neck stiffness).  Qty: 9 tablet, Refills: 0    Associated Diagnoses: NS (neck stiffness)      blood-glucose meter Misc 1 Device by Misc.(Non-Drug; Combo Route) route 3 (three) times daily before meals.  Qty: 1 each, Refills: 0      empagliflozin (JARDIANCE) 10 mg tablet Take 1 tablet (10 mg total) by mouth once daily.  Qty: 90 tablet, Refills: 3    Associated Diagnoses: Type 2 diabetes mellitus with hyperosmolarity " without coma, without long-term current use of insulin; Chronic systolic congestive heart failure      ergocalciferol, vitamin D2, (VITAMIN D ORAL) Take 2,000 Units by mouth.      fluticasone propionate (FLONASE) 50 mcg/actuation nasal spray 1 spray (50 mcg total) by Each Nostril route daily as needed for Rhinitis.  Qty: 9.9 mL, Refills: 0    Associated Diagnoses: PND (post-nasal drip)      HYDROcodone-acetaminophen (NORCO)  mg per tablet Take 1 tablet by mouth every 8 (eight) hours as needed for Pain.  Qty: 15 tablet, Refills: 0    Comments: Quantity prescribed more than 7 day supply? No  Associated Diagnoses: Biliary colic      metFORMIN (GLUCOPHAGE) 500 MG tablet Take 1 tablet (500 mg total) by mouth 2 (two) times daily with meals.  Qty: 60 tablet, Refills: 3    Associated Diagnoses: Type 2 diabetes mellitus with hyperglycemia, with long-term current use of insulin; Mixed diabetic hyperlipidemia associated with type 2 diabetes mellitus; Hypertension associated with type 2 diabetes mellitus      metoprolol succinate (TOPROL-XL) 25 MG 24 hr tablet Take 1 tablet (25 mg total) by mouth once daily.  Qty: 90 tablet, Refills: 3    Comments: .  Associated Diagnoses: Nonischemic cardiomyopathy      MOUNJARO 15 mg/0.5 mL PnIj Inject 15 mg into the skin once a week. for 24 doses  Qty: 2 mL, Refills: 5      NURTEC 75 mg odt Take 1 tablet (75 mg total) by mouth daily as needed for Migraine.  Qty: 8 tablet, Refills: 1    Associated Diagnoses: Migraine with aura and with status migrainosus, not intractable      ondansetron (ZOFRAN-ODT) 4 MG TbDL Take 1 tablet (4 mg total) by mouth every 6 (six) hours as needed (n/v).  Qty: 16 tablet, Refills: 2      pantoprazole (PROTONIX) 40 MG tablet Take 1 tablet (40 mg total) by mouth every evening.  Qty: 30 tablet, Refills: 11      sacubitriL-valsartan (ENTRESTO) 24-26 mg per tablet Take 1 tablet by mouth 2 (two) times daily.  Qty: 180 tablet, Refills: 3    Associated Diagnoses:  "Nonischemic cardiomyopathy; Coronary artery disease involving native coronary artery of native heart without angina pectoris      sildenafiL (VIAGRA) 50 MG tablet Take 1 tablet (50 mg total) by mouth daily as needed for Erectile Dysfunction.  Qty: 10 tablet, Refills: 2    Associated Diagnoses: Erectile dysfunction, unspecified erectile dysfunction type      TECHLITE PEN NEEDLE 32 gauge x 5/32" Ndle USE 1 DAILY. 100 DAY SUPPLY      testosterone cypionate (DEPOTESTOTERONE CYPIONATE) 100 mg/mL injection Inject 1 mL (100 mg total) into the muscle every 14 (fourteen) days. for 10 doses  Qty: 10 mL, Refills: 0    Comments: Please also dispense syringes and needles necessary for patient to self-administer.  Associated Diagnoses: Low testosterone           STOP taking these medications       BD ULTRA-FINE MINI PEN NEEDLE 31 gauge x 3/16" Ndle Comments:   Reason for Stopping:         lancets Misc Comments:   Reason for Stopping:         lancing device Misc Comments:   Reason for Stopping:         levocetirizine (XYZAL) 5 MG tablet Comments:   Reason for Stopping:         meclizine (ANTIVERT) 25 mg tablet Comments:   Reason for Stopping:             Alcon Schmidt MD   [facility] PGY1  01/29/2025 3:45 PM   "

## 2025-01-29 NOTE — MEDICAL/APP STUDENT
"  [facility]  General Surgery - Gold Team  Progress Note  Admit Date: 1/27/2025  HD#2  POD#2 Days Post-Op    Subjective:   Interval history:  Afebrile. No acute events overnight. Vital signs stable.    Patient was able to drink and eat yesterday without issue. He reports improving in recovery. He is able to void fine, but has not had a BM yet. Reports nausea when standing. Denies any fever, chills, or vomiting.     Objective:     VITAL SIGNS: 24 HR MIN & MAX LAST   Temp  Min: 97.5 °F (36.4 °C)  Max: 98.5 °F (36.9 °C)  98.5 °F (36.9 °C)   BP  Min: 99/63  Max: 116/71  100/66    Pulse  Min: 63  Max: 83  83    Resp  Min: 16  Max: 18  18    SpO2  Min: 92 %  Max: 98 %  (!) 94 %      HT: 5' 8" (172.7 cm)  WT: 120.1 kg (264 lb 12.8 oz)  BMI: 40.3     Intake/output:    Intake/Output Summary (Last 24 hours) at 1/29/2025 0608  Last data filed at 1/29/2025 0427  Gross per 24 hour   Intake 1784.93 ml   Output 1230 ml   Net 554.93 ml        Physical examination:  Gen: NAD, AAOx3, answering questions appropriately  HEENT: clear and moist membranes  CV: no murmurs, RR  Resp:clear to auscultations  Abd: S/NT/ND, dressing dry and intact, drain c/d/I   Ext: moving all extremities spontaneously and purposefully      Labs:  Recent Labs     01/27/25  1607 01/28/25  0522 01/28/25  0523 01/29/25  0507   WBC 7.65 11.22  --  8.79   HGB 13.1* 11.9*  --  11.5*   HCT 40.9* 36.5*  --  36.0*    345  --  339     --  136 137   K 3.9  --  4.1 4.2   *  --  108* 109*   CO2 20*  --  21* 24   BUN 10.0  --  14.6 14.5   CREATININE 0.85  --  0.99 0.98   BILITOT 0.6  --  0.7 0.6   AST 48*  --  76* 95*   ALT 45  --  69* 106*   ALKPHOS 81  --  75 75   CALCIUM 10.0  --  9.3 9.6   ALBUMIN 3.4*  --  3.1* 3.0*   MG 1.90  --  1.90 2.20   PHOS 3.2  --  3.6 2.8       Imaging:  US Abdomen Limited   Final Result      Cholelithiasis with gallbladder wall thickening and positive sonographic Gonzalez sign, suggestive of acute " cholecystitis.         Electronically signed by: Mita Mackey   Date:    01/27/2025   Time:    08:49         I have reviewed all pertinent imaging results/findings within the past 24 hours.    Micro/Path:  1/27/25 Specimen: pending    Assessment & Plan:   Chadwick Lorenzo is a 59 y.o male with a PMHx of HLD, T2DM, HF recovered EF (60% Jan 2023), migraines, kidney stones, and obesity. Presented to the ED 1/26/25 for mid epigastric pain.CTAP findings suggest acute cholecystitis with GB wall thickening and inflammatory strandings without fluid. U/S shows cholelithiasis w/ GB wall thickening suggestive of acute cholecystitis. S/p open gallbladder removal 1/27/2025. POD2, patient recovering well.       - Continue PO Augmentin Day 2/7  - observe drainage from LISA  - dressing change today  - staple removal at 2 weeks post-op  - MMPC  - Diet: Diabetic Diet  - Anti-emesis: Ondansetron  - Continue with PT/OT to maximize mobility    DVT ppx: subcutaneous heparin    Dispo: possibly home today    Waqar Roca  [facility], MS3

## 2025-01-29 NOTE — PLAN OF CARE
Problem: Adult Inpatient Plan of Care  Goal: Plan of Care Review  Outcome: Progressing  Goal: Optimal Comfort and Wellbeing  Outcome: Progressing     Problem: Wound  Goal: Optimal Coping  Outcome: Progressing  Goal: Improved Oral Intake  Outcome: Progressing     Problem: Heart Failure  Goal: Fluid and Electrolyte Balance  Outcome: Progressing  Goal: Improved Oral Intake  Outcome: Progressing

## 2025-01-29 NOTE — PROGRESS NOTES
"  [facility]  General Surgery - Gold Team  Progress Note  Admit Date: 1/27/2025  HD#2  POD#2 Days Post-Op    Subjective:   Interval history:  Afebrile. No acute events overnight. Vital signs stable.    Patient was able to drink and eat yesterday without issue. He reports improving in recovery. He is able to void fine, but has not had a BM yet. Reports nausea when standing. Denies any fever, chills, or vomiting.     Objective:     VITAL SIGNS: 24 HR MIN & MAX LAST   Temp  Min: 97.5 °F (36.4 °C)  Max: 98.5 °F (36.9 °C)  98.5 °F (36.9 °C)   BP  Min: 99/63  Max: 116/71  100/66    Pulse  Min: 63  Max: 83  83    Resp  Min: 16  Max: 18  18    SpO2  Min: 92 %  Max: 98 %  (!) 94 %      HT: 5' 8" (172.7 cm)  WT: 122.2 kg (269 lb 6.4 oz)  BMI: 41     Intake/output:    Intake/Output Summary (Last 24 hours) at 1/29/2025 0706  Last data filed at 1/29/2025 0651  Gross per 24 hour   Intake 1784.93 ml   Output 1235 ml   Net 549.93 ml        Physical examination:  Gen: NAD, AAOx3, answering questions appropriately  HEENT: clear and moist membranes  CV: no murmurs, RR  Resp:clear to auscultations  Abd: S/NT/ND, dressing dry and intact, drain c/d/I SS drainage   Ext: moving all extremities spontaneously and purposefully    Labs:  Recent Labs     01/27/25  1607 01/28/25  0522 01/28/25  0523 01/29/25  0507   WBC 7.65 11.22  --  8.79   HGB 13.1* 11.9*  --  11.5*   HCT 40.9* 36.5*  --  36.0*    345  --  339     --  136 137   K 3.9  --  4.1 4.2   *  --  108* 109*   CO2 20*  --  21* 24   BUN 10.0  --  14.6 14.5   CREATININE 0.85  --  0.99 0.98   BILITOT 0.6  --  0.7 0.6   AST 48*  --  76* 95*   ALT 45  --  69* 106*   ALKPHOS 81  --  75 75   CALCIUM 10.0  --  9.3 9.6   ALBUMIN 3.4*  --  3.1* 3.0*   MG 1.90  --  1.90 2.20   PHOS 3.2  --  3.6 2.8     Imaging:  US Abdomen Limited   Final Result      Cholelithiasis with gallbladder wall thickening and positive sonographic Gonzalez sign, suggestive of acute " cholecystitis.         Electronically signed by: Mita Mackey   Date:    01/27/2025   Time:    08:49         I have reviewed all pertinent imaging results/findings within the past 24 hours.    Micro/Path:  1/27/25 Specimen: pending    Assessment & Plan:   Chadwick Lorenzo is a 59 y.o male with a PMHx of HLD, T2DM, HF recovered EF (60% Jan 2023), migraines, kidney stones, and obesity. Presented to the ED 1/26/25 for mid epigastric pain.CTAP findings suggest acute cholecystitis with GB wall thickening and inflammatory strandings without fluid. U/S shows cholelithiasis w/ GB wall thickening suggestive of acute cholecystitis. S/p open gallbladder removal 1/27/2025. POD2, patient recovering well.     - Continue PO Augmentin Day 2/7  - observe drainage from LISA  - dressing change today  - staple removal at 2 weeks post-op  - MMPC  - Diet: Diabetic Diet  - Anti-emesis: Ondansetron  - Continue with PT/OT to maximize mobility    DVT ppx: subcutaneous heparin    Dispo: possibly home today pending pm check     Waqar Roca  [facility], MS3     Reviewed and edited.    Abraham Schmidt MD  [facility] PGY1

## 2025-01-29 NOTE — PLAN OF CARE
01/29/25 1548   Final Note   Assessment Type Final Discharge Note   Anticipated Discharge Disposition Home-Health   Post-Acute Status   Post-Acute Authorization Home Health   Home Health Status Set-up Complete/Auth obtained   Patient choice form signed by patient/caregiver List with quality metrics by geographic area provided   Discharge Delays None known at this time     Patient is being discharged home with [facility].

## 2025-01-29 NOTE — PLAN OF CARE
Problem: Adult Inpatient Plan of Care  Goal: Plan of Care Review  Outcome: Progressing  Goal: Patient-Specific Goal (Individualized)  Outcome: Progressing  Goal: Absence of Hospital-Acquired Illness or Injury  Outcome: Progressing  Goal: Optimal Comfort and Wellbeing  Outcome: Progressing  Goal: Readiness for Transition of Care  Outcome: Progressing     Problem: Diabetes Comorbidity  Goal: Blood Glucose Level Within Targeted Range  Outcome: Progressing     Problem: Wound  Goal: Optimal Coping  Outcome: Progressing  Goal: Optimal Functional Ability  Outcome: Progressing  Goal: Absence of Infection Signs and Symptoms  Outcome: Progressing  Goal: Improved Oral Intake  Outcome: Progressing  Goal: Optimal Pain Control and Function  Outcome: Progressing  Goal: Skin Health and Integrity  Outcome: Progressing  Goal: Optimal Wound Healing  Outcome: Progressing     Problem: Heart Failure  Goal: Optimal Coping  Outcome: Progressing  Goal: Optimal Cardiac Output  Outcome: Progressing  Goal: Stable Heart Rate and Rhythm  Outcome: Progressing  Goal: Optimal Functional Ability  Outcome: Progressing  Goal: Fluid and Electrolyte Balance  Outcome: Progressing  Goal: Improved Oral Intake  Outcome: Progressing  Goal: Effective Oxygenation and Ventilation  Outcome: Progressing  Goal: Effective Breathing Pattern During Sleep  Outcome: Progressing     Problem: Bariatric Environmental Safety  Goal: Safety Maintained with Care  Outcome: Progressing

## 2025-01-29 NOTE — PT/OT/SLP PROGRESS
Occupational Therapy   Treatment    Name: Chadwick Lorenzo  MRN: [id number]  Admitting Diagnosis:  Acute cholecystitis  2 Days Post-Op s/p laparoscopic converted to open cholecystitis with drain placement     Recommendations:     Discharge Recommendations: Low Intensity Therapy  Discharge Equipment Recommendations:   RW, pt has shower chair available (borrow from family member)  Barriers to discharge:  none    Assessment:     Chadwick Lorenzo is a 59 y.o. male with a medical diagnosis of Acute cholecystitis s/p laparoscopic converted to open cholecystitis with drain placement . Performance deficits affecting function are impaired self care skills, impaired balance, impaired functional mobility, pain. Pt with improved tolerance to therapy session today. No c/o dizziness or light headed with mobility.     Rehab Prognosis:  Good; patient would benefit from acute skilled OT services to address these deficits and reach maximum level of function.       Plan:     Patient to be seen 3 x/week to address the above listed problems via self-care/home management, therapeutic exercises, therapeutic activities, neuromuscular re-education  Plan of Care Expires:    Plan of Care Reviewed with: patient, spouse    Subjective     Chief Complaint: 5/10 pain in abdomen  Patient/Family Comments/goals: to return home PLOF  Pain/Comfort:  Pain Rating 1: 5/10  Location 1:  (stomach/surgery site)    Objective:     Communicated with: nurse prior to session.  Patient found HOB elevated with peripheral IV, LISA drain upon OT entry to room.    General Precautions: Standard,      Orthopedic Precautions:   Braces:    Respiratory Status: Room air   /74 HR 76  Occupational Performance:     Bed Mobility:    Patient completed Rolling/Turning to Left with  supervision and with side rail  Patient completed Scooting/Bridging with modified independence  Patient completed Supine to Sit with minimum assistance, with side rail, and req's HOB to remain  elevated due to c/o pain      Functional Mobility/Transfers:  Patient completed Sit <> Stand Transfer with supervision  with  rolling walker   Patient completed Bed <> Chair Transfer using Step Transfer technique with supervision with rolling walker  Functional Mobility: pt ambulated bed>toilet zoraida 5 ft with RW supervision, ambulated toilet>sink zoraida 12 ft RW supervision, ambulated sink>chair zoraida 12 ft     Activities of Daily Living:  Grooming: pt completed grooming in standing : brushed teeth, washed face supervision    Lower Body Dressing: discussed use of AE for LE dressing, pt not interested AE, stated will get family to assist as needed    Toileting: pt voided in standing at toilet supervision for clothing management, declined attempt to sit on toilet        [facility] 6 Click ADL:      Treatment & Education:  Education on OT POC, increasing OOB activity    Patient left up in chair with all lines intact, call button in reach, and spouse present    GOALS:   Multidisciplinary Problems       Occupational Therapy Goals          Problem: Occupational Therapy    Goal Priority Disciplines Outcome Interventions   Occupational Therapy Goal     OT, PT/OT Progressing    Description: Pt will complete functional transfers with least restrictive device mod I  Pt will ambulate short distances for ADL tasks in room with least restrictive device mod I  Pt will complete toileting tasks min A  Pt will complete grooming in standing at sink mod I  Pt will complete LE dressing min A                       DME Justifications:   Chadwick's mobility limitation cannot be sufficiently resolved by the use of a cane. His functional mobility deficit can be sufficiently resolved with the use of a Rolling Walker. Patient's mobility limitation significantly impairs their ability to participate in one of more activities of daily living.  The use of a RW will significantly improve the patient's ability to participate in MRADLS and the patient will use  it on regular basis in the home.    Time Tracking:     OT Date of Treatment: 01/29/25  OT Start Time: 1130  OT Stop Time: 1153  OT Total Time (min): 23 min    Billable Minutes:Self Care/Home Management 2               1/29/2025

## 2025-01-29 NOTE — PT/OT/SLP PROGRESS
Physical Therapy Treatment    Patient Name:  Chadwick Lorenzo   MRN:  [id number]    Recommendations     Therapy Intensity Recommendations at Discharge: Low Intensity Therapy  Discharge Equipment Recommendations:  (TBD pending progress)   Barriers to discharge: decreased endurance    Assessment     Chadwick Lorenzo is a 59 y.o. male admitted with a medical diagnosis of  Acute cholecystitis.  1. Acute cholecystitis    2. Colicky epigastric pain       Patient Active Problem List   Diagnosis    Nocturia    Fatigue    IGT (impaired glucose tolerance)    Asthma    Low testosterone    Mixed diabetic hyperlipidemia associated with type 2 diabetes mellitus    Type 2 diabetes mellitus with hyperglycemia, without long-term current use of insulin    Nonischemic cardiomyopathy    BMI 38.0-38.9,adult    Screen for colon cancer    FH: colon cancer in relative diagnosed at >50 years old    Hx of non anemic vitamin B12 deficiency    Immunization due    Thunderclap headache    Headache    Migraine with aura    Mild intermittent asthma    Class 2 severe obesity due to excess calories with serious comorbidity and body mass index (BMI) of 38.0 to 38.9 in adult    Seasonal allergies    Severe acute respiratory syndrome coronavirus 2 (SARS-CoV-2) vaccination not indicated    Primary hypertension    Mixed hyperlipidemia    Vitamin D deficiency    Hypertension associated with type 2 diabetes mellitus    Epigastric abdominal pain    Acute cholecystitis      He presents with the following impairments/functional limitations:  weakness, impaired endurance, impaired functional mobility, gait instability, impaired balance, pain.    Rehab Prognosis: Good.    Patient would benefit from continued skilled acute PT services to: address above listed impairments/functional limitations; receive patient/caregiver education; reduce fall risk; and maximize independency/safety with functional mobility.    Recent Surgery: Procedure(s) (LRB):  CHOLECYSTECTOMY,  LAPAROSCOPIC (N/A)  CHOLECYSTECTOMY 2 Days Post-Op    Plan     During this hospitalization, patient to be seen 5 x/week to address the identified impairments/functional limitations via gait training, therapeutic activities, therapeutic exercises and progress toward the established goals.    Plan of Care Expires:  02/27/25    Subjective     Communicated with patient's nurse  prior to session.    Patient agreeable to participate in treatment session.    Chief Complaint: pain  Patient/Family Comments/goals: to go home  Pain/Comfort:  Pain Rating 1: 5/10  Location 1: abdomen  Pain Addressed 1: Nurse notified, Pre-medicate for activity  Pain Rating Post-Intervention 1: 7/10    Objective     Patient found supine in bed and with HOB elevated with peripheral IV, LISA drain  upon PT entry to room.    General Precautions: Standard, fall   Orthopedic Precautions:N/A   Braces: NA    Respiratory Status: room air    Functional Mobility:    Bed Mobility:  Supine to Sit: minimum assistance  Sit to Supine: stand by assistance    Transfers:  Sit to Stand: stand by assistance with rolling walker  Stand to Sit: stand by assistance with rolling walker    Gait:  Patient ambulated 130ft with rolling walker and contact guard assistance.  Patient demonstrates :       decreased adriana      slowed, guarded, time consuming movements - all transitional activities.    Other Mobility:  N/A    Patient left supine in bed and with HOB elevated with all lines intact, call button in reach, tray table at bedside, and patient's nurse notified.    DME Justifications:   Chadwick's mobility limitation cannot be sufficiently resolved by the use of a cane. His functional mobility deficit can be sufficiently resolved with the use of a Rolling Walker. Patient's mobility limitation significantly impairs their ability to participate in one of more activities of daily living.  The use of a RW will significantly improve the patient's ability to participate in MRADLS  and the patient will use it on regular basis in the home.    Education     Patient educated on and assisted with functional mobility as noted above.  Patient educated on PT Plan of Care.  Patient was instructed to utilize staff assistance for mobility/transfers.  White board updated regarding patient's safest level of mobility with staff assistance    Goals     Multidisciplinary Problems       Physical Therapy Goals          Problem: Physical Therapy                      Time Tracking     PT Received On: 01/29/25  PT Start Time: 0847     PT Stop Time: 0902  PT Total Time (min): 15 min     Billable Minutes: Therapeutic Activity 15 mins    01/29/2025

## 2025-01-30 ENCOUNTER — PATIENT OUTREACH (OUTPATIENT)
Dept: ADMINISTRATIVE | Facility: CLINIC | Age: 60
End: 2025-01-30
Payer: COMMERCIAL

## 2025-01-30 NOTE — PT/OT/SLP DISCHARGE
Occupational Therapy Discharge Summary  Evaluating/treating therapist unavailable for discharge documentation; discharge note completed per chart review.    Chadwick Lorenzo  MRN: [id number]   Principal Problem: Acute cholecystitis      Patient Discharged from acute Occupational Therapy on 1/29/25.  Please refer to prior OT note dated 1/29/25 for functional status.    Assessment:      Goals partially met. Patient appropriate for care in another setting.    Objective:     GOALS:   Multidisciplinary Problems       Occupational Therapy Goals          Problem: Occupational Therapy    Goal Priority Disciplines Outcome Interventions   Occupational Therapy Goal     OT, PT/OT Adequate for Care Transition    Description: Pt will complete functional transfers with least restrictive device mod I  Pt will ambulate short distances for ADL tasks in room with least restrictive device mod I  Pt will complete toileting tasks min A  Pt will complete grooming in standing at sink mod I  Pt will complete LE dressing min A                       Reasons for Discontinuation of Therapy Services  Transfer to alternate level of care. and Satisfactory goal achievement.      Plan:     Patient Discharged to: Home with Home Health Service and family assist.    1/30/2025

## 2025-01-30 NOTE — PROGRESS NOTES
C3 nurse spoke with Chadwick Lorenzo for a TCC post hospital discharge follow up call. The patient does not have a scheduled HOSFU appointment with Clyde Hyatt MD within 5-7 days post hospital discharge date 1/29/25. C3 nurse was unable to schedule HOSFU appointment in [facility]. Message sent to PCP staff requesting they contact patient and schedule follow up appointment.

## 2025-01-31 NOTE — PT/OT/SLP DISCHARGE
POST DISCHARGE DOCUMENTATION - 01/31/2025 5:00 PM    Physical Therapy Discharge Note    Documenting Physical Therapist did not evaluate OR treat the patient.    Evaluating/treating Physical Therapist is not available to complete discharge summary.    Refer to prior Physical Therapy note dated 01/29/2025 for last known functional status of patient.      Name: Chadwick Lorenzo  MRN: [id number]   Principal Problem: Acute cholecystitis   1. Acute cholecystitis    2. Colicky epigastric pain       Patient Active Problem List   Diagnosis    Nocturia    Fatigue    IGT (impaired glucose tolerance)    Asthma    Low testosterone    Mixed diabetic hyperlipidemia associated with type 2 diabetes mellitus    Type 2 diabetes mellitus with hyperglycemia, without long-term current use of insulin    Nonischemic cardiomyopathy    BMI 38.0-38.9,adult    Screen for colon cancer    FH: colon cancer in relative diagnosed at >50 years old    Hx of non anemic vitamin B12 deficiency    Immunization due    Thunderclap headache    Headache    Migraine with aura    Mild intermittent asthma    Class 2 severe obesity due to excess calories with serious comorbidity and body mass index (BMI) of 38.0 to 38.9 in adult    Seasonal allergies    Severe acute respiratory syndrome coronavirus 2 (SARS-CoV-2) vaccination not indicated    Primary hypertension    Mixed hyperlipidemia    Vitamin D deficiency    Hypertension associated with type 2 diabetes mellitus    Epigastric abdominal pain     Recommendations: per last treatment session     Therapy Intensity Recommendations at Discharge: Low Intensity Therapy  Discharge Equipment Recommendations:  (TBD pending progress)     Assessment:     Patient last seen by PT SERVICES on 01/29/2025    Patient was unexpectedly discharged from hospital.    Refer to therapy's initial evaluation or last treatment (progress) note for patient's last known functional status, goal achievement and therapists'  recommendations.    Objective:     GOALS:  Multidisciplinary Problems       Physical Therapy Goals       Problem: Physical Therapy            Plan:     Patient Discharged to: [facility] per chart.    1/31/2025

## 2025-02-06 ENCOUNTER — OFFICE VISIT (OUTPATIENT)
Dept: SURGERY | Facility: CLINIC | Age: 60
End: 2025-02-06
Payer: COMMERCIAL

## 2025-02-06 ENCOUNTER — OFFICE VISIT (OUTPATIENT)
Dept: INTERNAL MEDICINE | Facility: CLINIC | Age: 60
End: 2025-02-06
Payer: COMMERCIAL

## 2025-02-06 VITALS
RESPIRATION RATE: 20 BRPM | DIASTOLIC BLOOD PRESSURE: 68 MMHG | HEART RATE: 74 BPM | HEIGHT: 68 IN | TEMPERATURE: 98 F | SYSTOLIC BLOOD PRESSURE: 100 MMHG | BODY MASS INDEX: 37.28 KG/M2 | WEIGHT: 246 LBS | OXYGEN SATURATION: 97 %

## 2025-02-06 VITALS
WEIGHT: 246 LBS | TEMPERATURE: 98 F | DIASTOLIC BLOOD PRESSURE: 77 MMHG | RESPIRATION RATE: 18 BRPM | BODY MASS INDEX: 37.28 KG/M2 | HEIGHT: 68 IN | HEART RATE: 75 BPM | SYSTOLIC BLOOD PRESSURE: 120 MMHG | OXYGEN SATURATION: 98 %

## 2025-02-06 DIAGNOSIS — K81.9 CHOLECYSTITIS: Primary | ICD-10-CM

## 2025-02-06 DIAGNOSIS — K81.0 ACUTE CHOLECYSTITIS: Primary | ICD-10-CM

## 2025-02-06 PROCEDURE — 99215 OFFICE O/P EST HI 40 MIN: CPT | Mod: PBBFAC

## 2025-02-06 PROCEDURE — 99215 OFFICE O/P EST HI 40 MIN: CPT | Mod: PBBFAC,27 | Performed by: INTERNAL MEDICINE

## 2025-02-06 NOTE — PROGRESS NOTES
[facility]  [address] [facility] [address]      PATIENT NAME: Chadwick Lorenzo  : 1965  DATE: 25  MRN: [id number]      Billing Provider: Clyde Hyatt MD  Level of Service:   Patient PCP Information       Provider PCP Type    Clyde Hyatt MD General            Reason for Visit / Chief Complaint: Hospital Follow Up (Dx: Lap Lesia)       History of Present Illness / Problem Focused Workflow     Chadwick Lorenzo presents to the clinic with Hospital Follow Up (Dx: Lap Lesia)     Patient is here for 1st word follow up.  He was recently discharged from the hospital on .  He was admitted for acute onset mid epigastric pain.  He was found to have acute cholecystitis.  He had an open cholecystectomy.  He had a subtotal cholecystectomy with drain placement.     Today, he is doing better. Still having pain. He saw surgery earlier today. LISA drain removed, staples removed. Using a walker due to pain. Patient is eatng/drinking having bowel movements    Review of Systems     10-point ROS performed and negative except as above.      Medical / Social / Family History     Past Medical History:   Diagnosis Date    CHF (congestive heart failure)     Diabetes mellitus, type 2     Hyperlipidemia     IGT (impaired glucose tolerance)     Kidney stones     Low testosterone in male     Migraine     Morbid obesity     Vitamin D deficiency        Past Surgical History:   Procedure Laterality Date    ANGIOGRAM, CORONARY, WITH LEFT HEART CATHETERIZATION N/A 2022    Procedure: Angiogram, Coronary, with Left Heart Cath;  Surgeon: Lev Manuel MD;  Location: [facility] CATH LAB;  Service: Cardiology;  Laterality: N/A;    CHOLECYSTECTOMY  2025    Procedure: CHOLECYSTECTOMY;  Surgeon: Ayden Escobedo Jr., MD;  Location: [facility] OR;  Service: General;;    kidney stones removal      LAPAROSCOPIC CHOLECYSTECTOMY N/A 2025    Procedure:  CHOLECYSTECTOMY, LAPAROSCOPIC;  Surgeon: Ayden Escobedo Jr., MD;  Location: [facility];  Service: General;  Laterality: N/A;    rotator cuff surgery         Social History    reports that he has never smoked. He has never used smokeless tobacco. He reports that he does not drink alcohol and does not use drugs.    Family History  's family history includes Cancer in his mother; Heart disease in his father; No Known Problems in his brother and sister.    Medications and Allergies     Medications  Outpatient Medications Marked as Taking for the 2/6/25 encounter (Office Visit) with Clyde Hyatt MD   Medication Sig Dispense Refill    acetaminophen (TYLENOL) 325 MG tablet Take 2 tablets (650 mg total) by mouth every 8 (eight) hours as needed for Pain. 30 tablet 0    albuterol (PROVENTIL/VENTOLIN HFA) 90 mcg/actuation inhaler Inhale 2 puffs into the lungs daily as needed for Shortness of Breath. 18 g 0    aspirin (ECOTRIN) 81 MG EC tablet Take 1 tablet (81 mg total) by mouth once daily. 90 tablet 3    atorvastatin (LIPITOR) 40 MG tablet Take 1 tablet (40 mg total) by mouth once daily. 90 tablet 3    blood-glucose meter Misc 1 Device by Misc.(Non-Drug; Combo Route) route 3 (three) times daily before meals. 1 each 0    empagliflozin (JARDIANCE) 10 mg tablet Take 1 tablet (10 mg total) by mouth once daily. 90 tablet 3    fluticasone propionate (FLONASE) 50 mcg/actuation nasal spray 1 spray (50 mcg total) by Each Nostril route daily as needed for Rhinitis. 9.9 mL 0    metFORMIN (GLUCOPHAGE) 500 MG tablet Take 1 tablet (500 mg total) by mouth 2 (two) times daily with meals. 60 tablet 3    metoprolol succinate (TOPROL-XL) 25 MG 24 hr tablet Take 1 tablet (25 mg total) by mouth once daily. 90 tablet 3    MOUNJARO 15 mg/0.5 mL PnIj Inject 15 mg into the skin once a week. for 24 doses 2 mL 5    NURTEC 75 mg odt Take 1 tablet (75 mg total) by mouth daily as needed for Migraine. 8 tablet 1    oxyCODONE (ROXICODONE) 10  "mg Tab immediate release tablet Take 1 tablet (10 mg total) by mouth every 4 (four) hours as needed for Pain. 15 tablet 0    pen needle, diabetic 33 gauge x 5/32" Ndle 365 Units by Misc.(Non-Drug; Combo Route) route once daily. 365 each 0    sacubitriL-valsartan (ENTRESTO) 24-26 mg per tablet Take 1 tablet by mouth 2 (two) times daily. 180 tablet 3    sildenafiL (VIAGRA) 50 MG tablet Take 1 tablet (50 mg total) by mouth daily as needed for Erectile Dysfunction. 10 tablet 2     Current Facility-Administered Medications for the 2/6/25 encounter (Office Visit) with Clyde Hyatt MD   Medication Dose Route Frequency Provider Last Rate Last Admin    sodium chloride 0.9% flush 10 mL  10 mL Intravenous PRN Lev Manuel MD           Allergies  Review of patient's allergies indicates:  No Known Allergies    Physical Examination     Vitals:    02/06/25 1237   BP: 100/68   Pulse: 74   Resp: 20   Temp: 98.1 °F (36.7 °C)     General: AAOx3, NAD, alert and cooperative  HEENT: EOMI, normal conjunctiva  Neck:  no JVD, supple, no masses or thyromegaly  CVS: S1/S2 nml, RRR, no murmurs, rubs or gallops, no carotid bruits  Resp: CTA B/L, no rhonchi, rales, or wheezing  GI: mild tenderness, not distended, BS+ obese. Surgical scar present, staples removed. Appears to be healing, no discharge  Skin: not jaundiced, warm, no rashes  Musculoskeletal: normal ROM, no joint tenderness, normal muscular development  Extremities: no peripheral edema, peripheral pulses intact  Neuro: CN II-XII grossly intact, strength and sensation symmetric and intact throughout, no focal neurological deficits        Results   Reviewed recent labs.        Assessment and Plan (including Health Maintenance)     Plan:   There are no diagnoses linked to this encounter.    Post vega follow up  S/p open subtotal cholecystectomy with LISA drain  Still having pain.  Patient has oxycodone. Will start trial of tyelnol.  Repeat CMP in 2 weeks.    Advised patient to " call me if he needs anything    F/u as previously scheduled or earlier if needed.     Health Maintenance Due   Topic Date Due    Shingles Vaccine (1 of 2) Never done    Diabetic Eye Exam  07/28/2023    Diabetes Urine Screening  09/20/2023    COVID-19 Vaccine (4 - 2024-25 season) 09/01/2024         Health Maintenance Topics with due status: Not Due       Topic Last Completion Date    TETANUS VACCINE 06/05/2020    Colorectal Cancer Screening 02/27/2023    Lipid Panel 07/10/2024    Foot Exam 01/09/2025    Hemoglobin A1c 01/27/2025    Low Dose Statin 02/06/2025    RSV Vaccine (Age 60+ and Pregnant patients) Not Due       No follow-ups on file.     Future Appointments   Date Time Provider Department Center   2/12/2025  8:00 AM [facility] [address] [place], [place] [address]    3/20/2025  9:30 AM [facility]    7/10/2025 12:30 PM [facility]   7/18/2025  9:45 AM Paulina Banuelos FNP [facility]            Signature:  Clyde Hyatt MD  [facility]  [address] [id number]    Date of encounter: 2/6/25

## 2025-02-06 NOTE — PROGRESS NOTES
I have reviewed the notes, assessments, and/or procedures performed by Dr Guthrie, I concur with her/his documentation of Chadwick Lorenzo.  Date of Service: 2/6/2025    [facility]

## 2025-02-06 NOTE — PROGRESS NOTES
[facility] General Surgery   Clinic Note      Subjective:   CC: Post-op follow up     HPI:   Chadwick Lorenzo is a 59 y.o., male w/ PMHx of CHF(EF 60%), HLD, T2DM s/p Open valdez 1/27 who presents to clinic for post-op follow up.     PMH:   Past Medical History:   Diagnosis Date    CHF (congestive heart failure)     Diabetes mellitus, type 2     Hyperlipidemia     IGT (impaired glucose tolerance)     Kidney stones     Low testosterone in male     Migraine     Morbid obesity     Vitamin D deficiency       Meds:   Current Outpatient Medications:     acetaminophen (TYLENOL) 325 MG tablet, Take 2 tablets (650 mg total) by mouth every 8 (eight) hours as needed for Pain., Disp: 30 tablet, Rfl: 0    albuterol (PROVENTIL/VENTOLIN HFA) 90 mcg/actuation inhaler, Inhale 2 puffs into the lungs daily as needed for Shortness of Breath., Disp: 18 g, Rfl: 0    aspirin (ECOTRIN) 81 MG EC tablet, Take 1 tablet (81 mg total) by mouth once daily., Disp: 90 tablet, Rfl: 3    atorvastatin (LIPITOR) 40 MG tablet, Take 1 tablet (40 mg total) by mouth once daily., Disp: 90 tablet, Rfl: 3    blood-glucose meter Misc, 1 Device by Misc.(Non-Drug; Combo Route) route 3 (three) times daily before meals., Disp: 1 each, Rfl: 0    empagliflozin (JARDIANCE) 10 mg tablet, Take 1 tablet (10 mg total) by mouth once daily., Disp: 90 tablet, Rfl: 3    fluticasone propionate (FLONASE) 50 mcg/actuation nasal spray, 1 spray (50 mcg total) by Each Nostril route daily as needed for Rhinitis., Disp: 9.9 mL, Rfl: 0    metFORMIN (GLUCOPHAGE) 500 MG tablet, Take 1 tablet (500 mg total) by mouth 2 (two) times daily with meals., Disp: 60 tablet, Rfl: 3    metoprolol succinate (TOPROL-XL) 25 MG 24 hr tablet, Take 1 tablet (25 mg total) by mouth once daily., Disp: 90 tablet, Rfl: 3    MOUNJARO 15 mg/0.5 mL PnIj, Inject 15 mg into the skin once a week. for 24 doses, Disp: 2 mL, Rfl: 5    NURTEC 75 mg odt, Take 1 tablet (75 mg total) by mouth daily as needed for Migraine.,  "Disp: 8 tablet, Rfl: 1    ondansetron (ZOFRAN-ODT) 4 MG TbDL, Take 1 tablet (4 mg total) by mouth every 6 (six) hours as needed (n/v)., Disp: 16 tablet, Rfl: 2    oxyCODONE (ROXICODONE) 10 mg Tab immediate release tablet, Take 1 tablet (10 mg total) by mouth every 4 (four) hours as needed for Pain., Disp: 15 tablet, Rfl: 0    pantoprazole (PROTONIX) 40 MG tablet, Take 1 tablet (40 mg total) by mouth every evening., Disp: 30 tablet, Rfl: 11    pen needle, diabetic 33 gauge x 5/32" Ndle, 365 Units by Misc.(Non-Drug; Combo Route) route once daily., Disp: 365 each, Rfl: 0    sacubitriL-valsartan (ENTRESTO) 24-26 mg per tablet, Take 1 tablet by mouth 2 (two) times daily., Disp: 180 tablet, Rfl: 3    sildenafiL (VIAGRA) 50 MG tablet, Take 1 tablet (50 mg total) by mouth daily as needed for Erectile Dysfunction., Disp: 10 tablet, Rfl: 2    baclofen (LIORESAL) 10 MG tablet, Take 1 tablet (10 mg total) by mouth 3 (three) times daily as needed (neck stiffness). (Patient not taking: Reported on 7/12/2024), Disp: 9 tablet, Rfl: 0    ergocalciferol, vitamin D2, (VITAMIN D ORAL), Take 2,000 Units by mouth. (Patient not taking: Reported on 1/9/2025), Disp: , Rfl:     HYDROcodone-acetaminophen (NORCO)  mg per tablet, Take 1 tablet by mouth every 8 (eight) hours as needed for Pain. (Patient not taking: Reported on 2/6/2025), Disp: 15 tablet, Rfl: 0    TECHLITE PEN NEEDLE 32 gauge x 5/32" Ndle, USE 1 DAILY. 100 DAY SUPPLY (Patient not taking: Reported on 2/6/2025), Disp: , Rfl:     testosterone cypionate (DEPOTESTOTERONE CYPIONATE) 100 mg/mL injection, Inject 1 mL (100 mg total) into the muscle every 14 (fourteen) days. for 10 doses (Patient not taking: Reported on 10/10/2023), Disp: 10 mL, Rfl: 0    Current Facility-Administered Medications:     sodium chloride 0.9% flush 10 mL, 10 mL, Intravenous, PRN, Lev Manuel MD  Allergies: Review of patient's allergies indicates:  No Known Allergies  Social History:   Social " History     Tobacco Use    Smoking status: Never    Smokeless tobacco: Never   Substance Use Topics    Alcohol use: Never    Drug use: Never     Family History:   Family History   Problem Relation Name Age of Onset    Cancer Mother      Heart disease Father      No Known Problems Sister      No Known Problems Brother       Surgical History:   Past Surgical History:   Procedure Laterality Date    ANGIOGRAM, CORONARY, WITH LEFT HEART CATHETERIZATION N/A 08/01/2022    Procedure: Angiogram, Coronary, with Left Heart Cath;  Surgeon: Lev Manuel MD;  Location: [facility] CATH LAB;  Service: Cardiology;  Laterality: N/A;    CHOLECYSTECTOMY  1/27/2025    Procedure: CHOLECYSTECTOMY;  Surgeon: Ayden Escobedo Jr., MD;  Location: [facility];  Service: General;;    kidney stones removal      LAPAROSCOPIC CHOLECYSTECTOMY N/A 1/27/2025    Procedure: CHOLECYSTECTOMY, LAPAROSCOPIC;  Surgeon: Ayden Escobedo Jr., MD;  Location: [facility];  Service: General;  Laterality: N/A;    rotator cuff surgery         Review of Systems:  Review of Systems negative expect as stated in HPI         Objective:       Vitals:  Vitals:    02/06/25 1121   BP: 120/77   Pulse: 75   Resp: 18   Temp: 97.8 °F (36.6 °C)        Physical Exam:  Gen: NAD  Neuro: awake, alert, answering questions appropriately  CV: RRR  Resp: non-labored breathing, MARKY  Abd: soft, ND  : deferred  Ext: moves all 4 spontaneously and purposefully  Skin: warm, well perfused    Pertinent Labs:  ***    Imaging:  ***    Micro/Path/Other:  ***  Assessment & Plan:     Chadwick Lorenzo is a 59 y.o., male who presents to clinic for ***        - ***    Jake Dunham, Medical Student

## 2025-02-06 NOTE — PROGRESS NOTES
[facility]   Clinic Note      Subjective:   CC: Post-op follow up     HPI:   Chadwick Lorenzo is a 59 y.o., male w/ PMHx of CHF(EF 60%), HLD, T2DM s/p laparoscopic converted to open subtotal cholecystectomy 1/27 who presents to clinic for post-op follow up, drain was left in place. Patient reports he has been doing good. He denies nausea, vomit, fever or chills and his pain is well controlled. He has been recording the output of his LISA drain and reports it has been less 20cc daily and consistency is clear and serous, denies any bilious drainage.      PMH:   Past Medical History:   Diagnosis Date    CHF (congestive heart failure)     Diabetes mellitus, type 2     Hyperlipidemia     IGT (impaired glucose tolerance)     Kidney stones     Low testosterone in male     Migraine     Morbid obesity     Vitamin D deficiency       Meds:   Current Outpatient Medications:     acetaminophen (TYLENOL) 325 MG tablet, Take 2 tablets (650 mg total) by mouth every 8 (eight) hours as needed for Pain., Disp: 30 tablet, Rfl: 0    albuterol (PROVENTIL/VENTOLIN HFA) 90 mcg/actuation inhaler, Inhale 2 puffs into the lungs daily as needed for Shortness of Breath., Disp: 18 g, Rfl: 0    aspirin (ECOTRIN) 81 MG EC tablet, Take 1 tablet (81 mg total) by mouth once daily., Disp: 90 tablet, Rfl: 3    atorvastatin (LIPITOR) 40 MG tablet, Take 1 tablet (40 mg total) by mouth once daily., Disp: 90 tablet, Rfl: 3    blood-glucose meter Misc, 1 Device by Misc.(Non-Drug; Combo Route) route 3 (three) times daily before meals., Disp: 1 each, Rfl: 0    empagliflozin (JARDIANCE) 10 mg tablet, Take 1 tablet (10 mg total) by mouth once daily., Disp: 90 tablet, Rfl: 3    fluticasone propionate (FLONASE) 50 mcg/actuation nasal spray, 1 spray (50 mcg total) by Each Nostril route daily as needed for Rhinitis., Disp: 9.9 mL, Rfl: 0    metFORMIN (GLUCOPHAGE) 500 MG tablet, Take 1 tablet (500 mg total) by mouth 2 (two) times daily with meals., Disp: 60  "tablet, Rfl: 3    metoprolol succinate (TOPROL-XL) 25 MG 24 hr tablet, Take 1 tablet (25 mg total) by mouth once daily., Disp: 90 tablet, Rfl: 3    MOUNJARO 15 mg/0.5 mL PnIj, Inject 15 mg into the skin once a week. for 24 doses, Disp: 2 mL, Rfl: 5    NURTEC 75 mg odt, Take 1 tablet (75 mg total) by mouth daily as needed for Migraine., Disp: 8 tablet, Rfl: 1    ondansetron (ZOFRAN-ODT) 4 MG TbDL, Take 1 tablet (4 mg total) by mouth every 6 (six) hours as needed (n/v)., Disp: 16 tablet, Rfl: 2    oxyCODONE (ROXICODONE) 10 mg Tab immediate release tablet, Take 1 tablet (10 mg total) by mouth every 4 (four) hours as needed for Pain., Disp: 15 tablet, Rfl: 0    pantoprazole (PROTONIX) 40 MG tablet, Take 1 tablet (40 mg total) by mouth every evening., Disp: 30 tablet, Rfl: 11    pen needle, diabetic 33 gauge x 5/32" Ndle, 365 Units by Misc.(Non-Drug; Combo Route) route once daily., Disp: 365 each, Rfl: 0    sacubitriL-valsartan (ENTRESTO) 24-26 mg per tablet, Take 1 tablet by mouth 2 (two) times daily., Disp: 180 tablet, Rfl: 3    sildenafiL (VIAGRA) 50 MG tablet, Take 1 tablet (50 mg total) by mouth daily as needed for Erectile Dysfunction., Disp: 10 tablet, Rfl: 2    baclofen (LIORESAL) 10 MG tablet, Take 1 tablet (10 mg total) by mouth 3 (three) times daily as needed (neck stiffness). (Patient not taking: Reported on 7/12/2024), Disp: 9 tablet, Rfl: 0    ergocalciferol, vitamin D2, (VITAMIN D ORAL), Take 2,000 Units by mouth. (Patient not taking: Reported on 1/9/2025), Disp: , Rfl:     HYDROcodone-acetaminophen (NORCO)  mg per tablet, Take 1 tablet by mouth every 8 (eight) hours as needed for Pain. (Patient not taking: Reported on 2/6/2025), Disp: 15 tablet, Rfl: 0    TECHLITE PEN NEEDLE 32 gauge x 5/32" Ndle, USE 1 DAILY. 100 DAY SUPPLY (Patient not taking: Reported on 2/6/2025), Disp: , Rfl:     testosterone cypionate (DEPOTESTOTERONE CYPIONATE) 100 mg/mL injection, Inject 1 mL (100 mg total) into the muscle " every 14 (fourteen) days. for 10 doses (Patient not taking: Reported on 10/10/2023), Disp: 10 mL, Rfl: 0    Current Facility-Administered Medications:     sodium chloride 0.9% flush 10 mL, 10 mL, Intravenous, PRN, Lev Manuel MD  Allergies: Review of patient's allergies indicates:  No Known Allergies  Social History:   Social History     Tobacco Use    Smoking status: Never    Smokeless tobacco: Never   Substance Use Topics    Alcohol use: Never    Drug use: Never     Family History:   Family History   Problem Relation Name Age of Onset    Cancer Mother      Heart disease Father      No Known Problems Sister      No Known Problems Brother       Surgical History:   Past Surgical History:   Procedure Laterality Date    ANGIOGRAM, CORONARY, WITH LEFT HEART CATHETERIZATION N/A 08/01/2022    Procedure: Angiogram, Coronary, with Left Heart Cath;  Surgeon: Lev Manuel MD;  Location: [facility] CATH LAB;  Service: Cardiology;  Laterality: N/A;    CHOLECYSTECTOMY  1/27/2025    Procedure: CHOLECYSTECTOMY;  Surgeon: Ayden Escobedo Jr., MD;  Location: [facility] OR;  Service: General;;    kidney stones removal      LAPAROSCOPIC CHOLECYSTECTOMY N/A 1/27/2025    Procedure: CHOLECYSTECTOMY, LAPAROSCOPIC;  Surgeon: Ayden Escobedo Jr., MD;  Location: [facility] OR;  Service: General;  Laterality: N/A;    rotator cuff surgery         Review of Systems:  Review of Systems negative expect as stated in HPI         Objective:       Vitals:  Vitals:    02/06/25 1121   BP: 120/77   Pulse: 75   Resp: 18   Temp: 97.8 °F (36.6 °C)        Physical Exam:  Gen: NAD  Neuro: awake, alert, answering questions appropriately  CV: RRR  Resp: non-labored breathing at room air  Abd: soft, ND, incisions look clean and dry, staples In place with some irritation around them but no pus or bleeding, drain in place with serosanguinous fluid, non bilious, abdomen is not tender, non distended  : deferred  Ext: moves all 4 spontaneously and  purposefully  Skin: warm, well perfused    Pertinent Labs:  No new labs    Imaging:  No new imaging    Micro/Path/Other:  Gallbladder, cholecystectomy:  - Acute cholecystitis and cholelithiasis.   Assessment & Plan:     Chadwick Lorenzo is a 59 y.o., male who presents to clinic for follow up after subtotal cholecystectomy on 1/27. Patient doing well and with minimal serous output on LISA drain.    - Staples removed at bedside, patient tolerated well  - LISA drain removed at bedside, tolerated well  - Return to clinic as needed    Jake Dunham, Medical Student      I have seen and examined the patient with the medical student. I agree with the assessment and plan and have made the appropriate edits to the note above.      Zachariah Guthrie MD  [employer] General Surgery PGY-1

## 2025-02-20 ENCOUNTER — PATIENT MESSAGE (OUTPATIENT)
Dept: INTERNAL MEDICINE | Facility: CLINIC | Age: 60
End: 2025-02-20
Payer: COMMERCIAL

## 2025-02-20 ENCOUNTER — LAB VISIT (OUTPATIENT)
Dept: LAB | Facility: HOSPITAL | Age: 60
End: 2025-02-20
Attending: INTERNAL MEDICINE
Payer: COMMERCIAL

## 2025-02-20 DIAGNOSIS — N52.9 ERECTILE DYSFUNCTION, UNSPECIFIED ERECTILE DYSFUNCTION TYPE: ICD-10-CM

## 2025-02-20 DIAGNOSIS — K81.9 CHOLECYSTITIS: ICD-10-CM

## 2025-02-20 DIAGNOSIS — E55.9 VITAMIN D DEFICIENCY: Primary | ICD-10-CM

## 2025-02-20 DIAGNOSIS — E08.00 DIABETES MELLITUS DUE TO UNDERLYING CONDITION WITH HYPEROSMOLARITY WITHOUT COMA, WITHOUT LONG-TERM CURRENT USE OF INSULIN: ICD-10-CM

## 2025-02-20 LAB
25(OH)D3+25(OH)D2 SERPL-MCNC: 16 NG/ML (ref 30–80)
ALBUMIN SERPL-MCNC: 4 G/DL (ref 3.5–5)
ALBUMIN/GLOB SERPL: 1 RATIO (ref 1.1–2)
ALP SERPL-CCNC: 95 UNIT/L (ref 40–150)
ALT SERPL-CCNC: 13 UNIT/L (ref 0–55)
ANION GAP SERPL CALC-SCNC: 7 MEQ/L
AST SERPL-CCNC: 14 UNIT/L (ref 5–34)
BACTERIA #/AREA URNS AUTO: ABNORMAL /HPF
BASOPHILS # BLD AUTO: 0.03 X10(3)/MCL
BASOPHILS NFR BLD AUTO: 0.5 %
BILIRUB SERPL-MCNC: 0.5 MG/DL
BILIRUB UR QL STRIP.AUTO: NEGATIVE
BUN SERPL-MCNC: 11.7 MG/DL (ref 8.4–25.7)
CALCIUM SERPL-MCNC: 10.6 MG/DL (ref 8.4–10.2)
CHLORIDE SERPL-SCNC: 109 MMOL/L (ref 98–107)
CHOLEST SERPL-MCNC: 181 MG/DL
CHOLEST/HDLC SERPL: 5 {RATIO} (ref 0–5)
CLARITY UR: CLEAR
CO2 SERPL-SCNC: 25 MMOL/L (ref 22–29)
COLOR UR AUTO: ABNORMAL
CREAT SERPL-MCNC: 0.81 MG/DL (ref 0.72–1.25)
CREAT UR-MCNC: 178.1 MG/DL (ref 63–166)
CREAT/UREA NIT SERPL: 14
EOSINOPHIL # BLD AUTO: 0.35 X10(3)/MCL (ref 0–0.9)
EOSINOPHIL NFR BLD AUTO: 5.7 %
ERYTHROCYTE [DISTWIDTH] IN BLOOD BY AUTOMATED COUNT: 13 % (ref 11.5–17)
EST. AVERAGE GLUCOSE BLD GHB EST-MCNC: 114 MG/DL
GFR SERPLBLD CREATININE-BSD FMLA CKD-EPI: >60 ML/MIN/1.73/M2
GLOBULIN SER-MCNC: 4.1 GM/DL (ref 2.4–3.5)
GLUCOSE SERPL-MCNC: 88 MG/DL (ref 74–100)
GLUCOSE UR QL STRIP: ABNORMAL
HBA1C MFR BLD: 5.6 %
HCT VFR BLD AUTO: 43.4 % (ref 42–52)
HDLC SERPL-MCNC: 37 MG/DL (ref 35–60)
HGB BLD-MCNC: 13.5 G/DL (ref 14–18)
HGB UR QL STRIP: NEGATIVE
HYALINE CASTS #/AREA URNS LPF: ABNORMAL /LPF
IMM GRANULOCYTES # BLD AUTO: 0 X10(3)/MCL (ref 0–0.04)
IMM GRANULOCYTES NFR BLD AUTO: 0 %
KETONES UR QL STRIP: NEGATIVE
LDLC SERPL CALC-MCNC: 128 MG/DL (ref 50–140)
LEUKOCYTE ESTERASE UR QL STRIP: NEGATIVE
LYMPHOCYTES # BLD AUTO: 3.51 X10(3)/MCL (ref 0.6–4.6)
LYMPHOCYTES NFR BLD AUTO: 57.3 %
MCH RBC QN AUTO: 27.5 PG (ref 27–31)
MCHC RBC AUTO-ENTMCNC: 31.1 G/DL (ref 33–36)
MCV RBC AUTO: 88.4 FL (ref 80–94)
MICROALBUMIN UR-MCNC: 12.4 UG/ML
MICROALBUMIN/CREAT RATIO PNL UR: 7 MG/GM CR (ref 0–30)
MONOCYTES # BLD AUTO: 0.49 X10(3)/MCL (ref 0.1–1.3)
MONOCYTES NFR BLD AUTO: 8 %
MUCOUS THREADS URNS QL MICRO: ABNORMAL /LPF
NEUTROPHILS # BLD AUTO: 1.75 X10(3)/MCL (ref 2.1–9.2)
NEUTROPHILS NFR BLD AUTO: 28.5 %
NITRITE UR QL STRIP: NEGATIVE
NRBC BLD AUTO-RTO: 0 %
PH UR STRIP: 5.5 [PH]
PLATELET # BLD AUTO: 419 X10(3)/MCL (ref 130–400)
PMV BLD AUTO: 8.8 FL (ref 7.4–10.4)
POTASSIUM SERPL-SCNC: 3.9 MMOL/L (ref 3.5–5.1)
PROT SERPL-MCNC: 8.1 GM/DL (ref 6.4–8.3)
PROT UR QL STRIP: NEGATIVE
RBC # BLD AUTO: 4.91 X10(6)/MCL (ref 4.7–6.1)
RBC #/AREA URNS AUTO: ABNORMAL /HPF
SODIUM SERPL-SCNC: 141 MMOL/L (ref 136–145)
SP GR UR STRIP.AUTO: 1.04 (ref 1–1.03)
SQUAMOUS #/AREA URNS LPF: ABNORMAL /HPF
TRIGL SERPL-MCNC: 82 MG/DL (ref 34–140)
TSH SERPL-ACNC: 1.25 UIU/ML (ref 0.35–4.94)
UROBILINOGEN UR STRIP-ACNC: NORMAL
VLDLC SERPL CALC-MCNC: 16 MG/DL
WBC # BLD AUTO: 6.13 X10(3)/MCL (ref 4.5–11.5)
WBC #/AREA URNS AUTO: ABNORMAL /HPF

## 2025-02-20 PROCEDURE — 85025 COMPLETE CBC W/AUTO DIFF WBC: CPT

## 2025-02-20 PROCEDURE — 81015 MICROSCOPIC EXAM OF URINE: CPT

## 2025-02-20 PROCEDURE — 82306 VITAMIN D 25 HYDROXY: CPT

## 2025-02-20 PROCEDURE — 83036 HEMOGLOBIN GLYCOSYLATED A1C: CPT

## 2025-02-20 PROCEDURE — 36415 COLL VENOUS BLD VENIPUNCTURE: CPT

## 2025-02-20 PROCEDURE — 80053 COMPREHEN METABOLIC PANEL: CPT

## 2025-02-20 PROCEDURE — 82043 UR ALBUMIN QUANTITATIVE: CPT

## 2025-02-20 PROCEDURE — 84443 ASSAY THYROID STIM HORMONE: CPT

## 2025-02-20 PROCEDURE — 80061 LIPID PANEL: CPT

## 2025-02-20 RX ORDER — ERGOCALCIFEROL 1.25 MG/1
50000 CAPSULE ORAL
Qty: 6 CAPSULE | Refills: 0 | Status: SHIPPED | OUTPATIENT
Start: 2025-02-20 | End: 2025-03-28

## 2025-02-20 RX ORDER — ERGOCALCIFEROL 1.25 MG/1
50000 CAPSULE ORAL
Qty: 6 CAPSULE | Refills: 0 | Status: SHIPPED | OUTPATIENT
Start: 2025-02-20 | End: 2025-02-20

## 2025-02-20 NOTE — TELEPHONE ENCOUNTER
Pt called, name and  verified. Pt informed of lab results and new order for Vitamin D 50,000 IU's every 7 days for 6 weeks. Pt verbalized 100% understanding. No further questions or concerns noted. Call ended.

## 2025-02-24 ENCOUNTER — HOSPITAL ENCOUNTER (EMERGENCY)
Facility: HOSPITAL | Age: 60
Discharge: HOME | End: 2025-02-24
Attending: EMERGENCY MEDICINE
Payer: COMMERCIAL

## 2025-02-24 VITALS
SYSTOLIC BLOOD PRESSURE: 127 MMHG | OXYGEN SATURATION: 98 % | DIASTOLIC BLOOD PRESSURE: 80 MMHG | TEMPERATURE: 97 F | WEIGHT: 250 LBS | RESPIRATION RATE: 20 BRPM | HEIGHT: 68 IN | HEART RATE: 70 BPM | BODY MASS INDEX: 37.89 KG/M2

## 2025-02-24 DIAGNOSIS — M54.50 ACUTE RIGHT-SIDED LOW BACK PAIN WITHOUT SCIATICA: Primary | ICD-10-CM

## 2025-02-24 LAB
ALBUMIN SERPL-MCNC: 4.1 G/DL (ref 3.5–5)
ALBUMIN/GLOB SERPL: 1.1 RATIO (ref 1.1–2)
ALP SERPL-CCNC: 90 UNIT/L (ref 40–150)
ALT SERPL-CCNC: 14 UNIT/L (ref 0–55)
ANION GAP SERPL CALC-SCNC: 5 MEQ/L
AST SERPL-CCNC: 14 UNIT/L (ref 5–34)
BACTERIA #/AREA URNS AUTO: ABNORMAL /HPF
BASOPHILS # BLD AUTO: 0.04 X10(3)/MCL
BASOPHILS NFR BLD AUTO: 0.8 %
BILIRUB SERPL-MCNC: 0.5 MG/DL
BILIRUB UR QL STRIP.AUTO: NEGATIVE
BUN SERPL-MCNC: 9.8 MG/DL (ref 8.4–25.7)
CALCIUM SERPL-MCNC: 10.6 MG/DL (ref 8.4–10.2)
CHLORIDE SERPL-SCNC: 109 MMOL/L (ref 98–107)
CLARITY UR: CLEAR
CO2 SERPL-SCNC: 25 MMOL/L (ref 22–29)
COLOR UR AUTO: ABNORMAL
CREAT SERPL-MCNC: 0.75 MG/DL (ref 0.72–1.25)
CREAT/UREA NIT SERPL: 13
EOSINOPHIL # BLD AUTO: 0.41 X10(3)/MCL (ref 0–0.9)
EOSINOPHIL NFR BLD AUTO: 8.3 %
ERYTHROCYTE [DISTWIDTH] IN BLOOD BY AUTOMATED COUNT: 13 % (ref 11.5–17)
GFR SERPLBLD CREATININE-BSD FMLA CKD-EPI: >60 ML/MIN/1.73/M2
GLOBULIN SER-MCNC: 3.8 GM/DL (ref 2.4–3.5)
GLUCOSE SERPL-MCNC: 79 MG/DL (ref 74–100)
GLUCOSE UR QL STRIP: ABNORMAL
HCT VFR BLD AUTO: 41.5 % (ref 42–52)
HGB BLD-MCNC: 13.3 G/DL (ref 14–18)
HGB UR QL STRIP: NEGATIVE
HOLD SPECIMEN: NORMAL
HYALINE CASTS #/AREA URNS LPF: ABNORMAL /LPF
IMM GRANULOCYTES # BLD AUTO: 0.01 X10(3)/MCL (ref 0–0.04)
IMM GRANULOCYTES NFR BLD AUTO: 0.2 %
KETONES UR QL STRIP: NEGATIVE
LEUKOCYTE ESTERASE UR QL STRIP: NEGATIVE
LYMPHOCYTES # BLD AUTO: 2.93 X10(3)/MCL (ref 0.6–4.6)
LYMPHOCYTES NFR BLD AUTO: 59.3 %
MCH RBC QN AUTO: 28.2 PG (ref 27–31)
MCHC RBC AUTO-ENTMCNC: 32 G/DL (ref 33–36)
MCV RBC AUTO: 87.9 FL (ref 80–94)
MONOCYTES # BLD AUTO: 0.38 X10(3)/MCL (ref 0.1–1.3)
MONOCYTES NFR BLD AUTO: 7.7 %
MUCOUS THREADS URNS QL MICRO: ABNORMAL /LPF
NEUTROPHILS # BLD AUTO: 1.17 X10(3)/MCL (ref 2.1–9.2)
NEUTROPHILS NFR BLD AUTO: 23.7 %
NITRITE UR QL STRIP: NEGATIVE
NRBC BLD AUTO-RTO: 0 %
PH UR STRIP: 6.5 [PH]
PLATELET # BLD AUTO: 320 X10(3)/MCL (ref 130–400)
PMV BLD AUTO: 8.8 FL (ref 7.4–10.4)
POTASSIUM SERPL-SCNC: 3.8 MMOL/L (ref 3.5–5.1)
PROT SERPL-MCNC: 7.9 GM/DL (ref 6.4–8.3)
PROT UR QL STRIP: NEGATIVE
RBC # BLD AUTO: 4.72 X10(6)/MCL (ref 4.7–6.1)
RBC #/AREA URNS AUTO: ABNORMAL /HPF
SODIUM SERPL-SCNC: 139 MMOL/L (ref 136–145)
SP GR UR STRIP.AUTO: 1.02 (ref 1–1.03)
SQUAMOUS #/AREA URNS LPF: ABNORMAL /HPF
UROBILINOGEN UR STRIP-ACNC: NORMAL
WBC # BLD AUTO: 4.94 X10(3)/MCL (ref 4.5–11.5)
WBC #/AREA URNS AUTO: ABNORMAL /HPF

## 2025-02-24 PROCEDURE — 99285 EMERGENCY DEPT VISIT HI MDM: CPT | Mod: 25

## 2025-02-24 PROCEDURE — 85025 COMPLETE CBC W/AUTO DIFF WBC: CPT | Performed by: NURSE PRACTITIONER

## 2025-02-24 PROCEDURE — 96372 THER/PROPH/DIAG INJ SC/IM: CPT | Performed by: NURSE PRACTITIONER

## 2025-02-24 PROCEDURE — 63600175 PHARM REV CODE 636 W HCPCS: Mod: JZ,TB | Performed by: NURSE PRACTITIONER

## 2025-02-24 PROCEDURE — 80053 COMPREHEN METABOLIC PANEL: CPT | Performed by: NURSE PRACTITIONER

## 2025-02-24 PROCEDURE — 25000003 PHARM REV CODE 250: Performed by: NURSE PRACTITIONER

## 2025-02-24 PROCEDURE — 81001 URINALYSIS AUTO W/SCOPE: CPT | Performed by: NURSE PRACTITIONER

## 2025-02-24 RX ORDER — METHOCARBAMOL 500 MG/1
1000 TABLET, FILM COATED ORAL 3 TIMES DAILY PRN
Qty: 30 TABLET | Refills: 0 | Status: SHIPPED | OUTPATIENT
Start: 2025-02-24

## 2025-02-24 RX ORDER — METHOCARBAMOL 500 MG/1
1000 TABLET, FILM COATED ORAL
Status: COMPLETED | OUTPATIENT
Start: 2025-02-24 | End: 2025-02-24

## 2025-02-24 RX ORDER — DICLOFENAC SODIUM 75 MG/1
75 TABLET, DELAYED RELEASE ORAL 2 TIMES DAILY PRN
Qty: 20 TABLET | Refills: 0 | Status: SHIPPED | OUTPATIENT
Start: 2025-02-24

## 2025-02-24 RX ORDER — KETOROLAC TROMETHAMINE 30 MG/ML
30 INJECTION, SOLUTION INTRAMUSCULAR; INTRAVENOUS
Status: COMPLETED | OUTPATIENT
Start: 2025-02-24 | End: 2025-02-24

## 2025-02-24 RX ADMIN — KETOROLAC TROMETHAMINE 30 MG: 30 INJECTION, SOLUTION INTRAMUSCULAR; INTRAVENOUS at 01:02

## 2025-02-24 RX ADMIN — METHOCARBAMOL 1000 MG: 500 TABLET ORAL at 01:02

## 2025-02-24 NOTE — ED PROVIDER NOTES
Encounter Date: 2/24/2025       History     Chief Complaint   Patient presents with    Back Pain     C/o constant back pain since Friday. Hx of kidney stones     The patient presents with low back pain.  The onset was 3 days ago.  The course/duration of symptoms is constant.  Type of injury: none and none recent, denies falls.  Location: Right lumbar. Radiating pain: none. The character of symptoms is dull.  The degree at onset was moderate.  The degree at present is moderate.  There are exacerbating factors including movement and changing position.  The relieving factor is immobilization.  Risk factors consist of - he reports similar pain in the past when he had a kidney stone.  Prior episodes: occasional.  Therapy today: none.  Associated symptoms: none, denies fever, denies chills, denies bowel dysfunction, denies bladder dysfunction, denies altered sensation, denies focal weakness, denies saddle numbness, denies abdominal pain and denies dysuria.        Review of patient's allergies indicates:  No Known Allergies  Past Medical History:   Diagnosis Date    CHF (congestive heart failure)     Diabetes mellitus, type 2     Hyperlipidemia     IGT (impaired glucose tolerance)     Kidney stones     Low testosterone in male     Migraine     Morbid obesity     Vitamin D deficiency      Past Surgical History:   Procedure Laterality Date    ANGIOGRAM, CORONARY, WITH LEFT HEART CATHETERIZATION N/A 08/01/2022    Procedure: Angiogram, Coronary, with Left Heart Cath;  Surgeon: Lev Manuel MD;  Location: [facility] CATH LAB;  Service: Cardiology;  Laterality: N/A;    CHOLECYSTECTOMY  1/27/2025    Procedure: CHOLECYSTECTOMY;  Surgeon: Ayden Escobedo Jr., MD;  Location: [facility] OR;  Service: General;;    kidney stones removal      LAPAROSCOPIC CHOLECYSTECTOMY N/A 1/27/2025    Procedure: CHOLECYSTECTOMY, LAPAROSCOPIC;  Surgeon: Ayden Escobedo Jr., MD;  Location: [facility] OR;  Service: General;  Laterality: N/A;    rotator cuff  surgery       Family History   Problem Relation Name Age of Onset    Cancer Mother      Heart disease Father      No Known Problems Sister      No Known Problems Brother       Social History[1]  Review of Systems   Constitutional:  Negative for fever.   HENT:  Negative for sore throat.    Respiratory:  Negative for shortness of breath.    Cardiovascular:  Negative for chest pain.   Gastrointestinal:  Negative for nausea.   Genitourinary:  Negative for dysuria.   Musculoskeletal:  Positive for back pain.   Skin:  Negative for rash.   Neurological:  Negative for weakness.   Hematological:  Does not bruise/bleed easily.   All other systems reviewed and are negative.      Physical Exam     Initial Vitals [02/24/25 1151]   BP Pulse Resp Temp SpO2   (!) 145/91 70 20 97.2 °F (36.2 °C) 99 %      MAP       --         Physical Exam    Nursing note and vitals reviewed.  Constitutional: He appears well-developed and well-nourished.   HENT:   Head: Normocephalic and atraumatic.   Neck: Neck supple.   Normal range of motion.  Cardiovascular:  Normal rate, regular rhythm, normal heart sounds and intact distal pulses.           Pulmonary/Chest: Effort normal and breath sounds normal. He has no decreased breath sounds.   Abdominal: Abdomen is soft and flat. Bowel sounds are normal. There is no abdominal tenderness.   Musculoskeletal:         General: Normal range of motion.      Cervical back: Normal range of motion and neck supple.      Comments: Back: No costovertebral angle tenderness, , Thoracic: no vertebral point tenderness, Lumbar: Right lateral mild tenderness, midline negative, no vertebral point tenderness, Sacral: no vertebral point tenderness, Testing: Straight leg raising, supine negative.  No C/T/L point tenderness. normal reflexes.        Neurological: He is alert and oriented to person, place, and time. He has normal strength.   Skin: Skin is warm and dry.   Psychiatric: He has a normal mood and affect.         ED  Course   Procedures  Labs Reviewed   COMPREHENSIVE METABOLIC PANEL - Abnormal       Result Value    Sodium 139      Potassium 3.8      Chloride 109 (*)     CO2 25      Glucose 79      Blood Urea Nitrogen 9.8      Creatinine 0.75      Calcium 10.6 (*)     Protein Total 7.9      Albumin 4.1      Globulin 3.8 (*)     Albumin/Globulin Ratio 1.1      Bilirubin Total 0.5      ALP 90      ALT 14      AST 14      eGFR >60      Anion Gap 5.0      BUN/Creatinine Ratio 13     URINALYSIS, REFLEX TO URINE CULTURE - Abnormal    Color, UA Light-Yellow      Appearance, UA Clear      Specific Gravity, UA 1.023      pH, UA 6.5      Protein, UA Negative      Glucose, UA 4+ (*)     Ketones, UA Negative      Blood, UA Negative      Bilirubin, UA Negative      Urobilinogen, UA Normal      Nitrites, UA Negative      Leukocyte Esterase, UA Negative      RBC, UA 0-5      WBC, UA 0-5      Bacteria, UA None Seen      Squamous Epithelial Cells, UA Trace (*)     Mucous, UA Trace (*)     Hyaline Casts, UA None Seen     CBC WITH DIFFERENTIAL - Abnormal    WBC 4.94      RBC 4.72      Hgb 13.3 (*)     Hct 41.5 (*)     MCV 87.9      MCH 28.2      MCHC 32.0 (*)     RDW 13.0      Platelet 320      MPV 8.8      Neut % 23.7      Lymph % 59.3      Mono % 7.7      Eos % 8.3      Basophil % 0.8      Imm Grans % 0.2      Neut # 1.17 (*)     Lymph # 2.93      Mono # 0.38      Eos # 0.41      Baso # 0.04      Imm Gran # 0.01      NRBC% 0.0     CBC W/ AUTO DIFFERENTIAL    Narrative:     The following orders were created for panel order CBC auto differential.  Procedure                               Abnormality         Status                     ---------                               -----------         ------                     CBC with Differential[[id number]]       Abnormal            Final result                 Please view results for these tests on the individual orders.   EXTRA TUBES    Narrative:     The following orders were created for panel order  EXTRA TUBES.  Procedure                               Abnormality         Status                     ---------                               -----------         ------                     Light Blue Top Hold[[id number]]                             In process                 Gold Top Hold[[id number]]                                   In process                 Pink Top Hold[[id number]]                                   In process                   Please view results for these tests on the individual orders.   LIGHT BLUE TOP HOLD   GOLD TOP HOLD   PINK TOP HOLD          Imaging Results              CT Renal Stone Study ABD Pelvis WO (Final result)  Result time 02/24/25 13:00:41      Final result by Ned Aguilera MD (02/24/25 13:00:41)                   Impression:      Nonobstructing medullary calculus in the midpole of the left kidney    Simple appearing cyst in the right kidney    Status post recent cholecystectomy    Benign lipomatous type nodule seen in the right adrenal gland      Electronically signed by: Brock Aguilera  Date:    02/24/2025  Time:    13:00               Narrative:    EXAMINATION:  CT RENAL STONE STUDY ABD PELVIS WO    CLINICAL HISTORY:  Flank pain, kidney stone suspected;    TECHNIQUE:  Low dose axial images, sagittal and coronal reformations were obtained from the lung bases to the pubic symphysis.  No contrast was administered.  Automatic exposure control is utilized to reduce patient radiation exposure.    COMPARISON:  01/27/2025    FINDINGS:  The lung bases are clear.    The liver appears normal.  No obvious liver mass or lesion is seen.    Patient is status post recent cholecystectomy.    The pancreas appears normal.  No inflammatory changes are seen in the pancreatic region.    The spleen appears normal.    There is a 2.6 x 2.5 cm nodule in the right adrenal gland with precontrast Hounsfield units of -20 suggesting a benign lipomatous type lesion.  The left adrenal gland appears  normal.    There is an exophytic cyst in the upper pole of the right kidney.  There is a punctate medullary seen in the midpole the left kidney not causing any obstruction.  No hydronephrosis or hydroureter seen.  No ureterolithiasis is seen.    No colitis is seen.  No diverticulitis is seen.  No appendicitis is seen.  There is a small periumbilical hernia seen containing some fat.    No free air seen.  No free fluid is seen.  The urinary bladder appears normal.    Bones show no acute abnormality.                                       Medications   ketorolac injection 30 mg (has no administration in time range)   methocarbamoL tablet 1,000 mg (has no administration in time range)     Medical Decision Making  The patient presents with low back pain.  The onset was 3 days ago.  The course/duration of symptoms is constant.  Type of injury: none and none recent, denies falls.  Location: Right lumbar. Radiating pain: none. The character of symptoms is dull.  The degree at onset was moderate.  The degree at present is moderate.  There are exacerbating factors including movement and changing position.  The relieving factor is immobilization.  Risk factors consist of - he reports similar pain in the past when he had a kidney stone.  Prior episodes: occasional.  Therapy today: none.  Associated symptoms: none, denies fever, denies chills, denies bowel dysfunction, denies bladder dysfunction, denies altered sensation, denies focal weakness, denies saddle numbness, denies abdominal pain and denies dysuria.      Workup unremarkable. He will f/u with his pcp.1:23 PM DISPOSITION: The patient is resting comfortably in no acute distress.  He is hemodynamically stable and is without objective evidence for acute process requiring urgent intervention or hospitalization. I provided counseling to patient with regard to condition, the treatment plan, specific conditions for return, and the importance of follow up. Detailed written and  verbal instructions provided to patient and he expressed a verbal understanding of the discharge instructions and overall management plan. Reiterated the importance of medication administration and safety and advised patient to follow up with primary care provider in 3-5 days or sooner if needed.  Answered questions at this time. The patient is stable for discharge.       Amount and/or Complexity of Data Reviewed  Labs: ordered.  Radiology: ordered. Decision-making details documented in ED Course.    Risk  Prescription drug management.      Additional MDM:   Differential Diagnosis:   At this time differential diagnosis is but not limited to fracture, sprain, sciatica, kidney stone             ED Course as of 02/24/25 1324   Mon Feb 24, 2025   1316 CT Renal Stone Study ABD Pelvis WO  Impression:     Nonobstructing medullary calculus in the midpole of the left kidney     Simple appearing cyst in the right kidney     Status post recent cholecystectomy     Benign lipomatous type nodule seen in the right adrenal gland   [RB]   1322 Given strict ED return precautions. I have spoken with the patient and/or caregivers. I have explained the patient's condition, diagnoses and treatment plan based on the information available to me at this time. I have answered the patient's and/or caregiver's questions and addressed any concerns. The patient and/or caregivers have as good an understanding of the patient's diagnosis, condition and treatment plan as can be expected at this point. The vital signs have been stable. The patient's condition is stable and appropriate for discharge from the emergency department.      The patient will pursue further outpatient evaluation with the primary care physician or other designated or consulting physician as outlined in the discharge instructions. The patient and/or caregivers are agreeable to this plan of care and follow-up instructions have been explained in detail. The patient and/or  caregivers have received these instructions in written format and have expressed an understanding of the discharge instructions. The patient and/or caregivers are aware that any significant change in condition or worsening of symptoms should prompt an immediate return to this or the closest emergency department or a call to 911.      [RB]      ED Course User Index  [RB] Hernando Jarrett ACNP                           Clinical Impression:  Final diagnoses:  [M54.50] Acute right-sided low back pain without sciatica (Primary)          ED Disposition Condition    Discharge Stable          ED Prescriptions       Medication Sig Dispense Start Date End Date Auth. Provider    diclofenac (VOLTAREN) 75 MG EC tablet Take 1 tablet (75 mg total) by mouth 2 (two) times daily as needed (pain). Do not take with ibuprofen, motrin, aleve, naprosyn, or any other NSAID. 20 tablet 2/24/2025 -- Hernando Jarrett ACNP    methocarbamoL (ROBAXIN) 500 MG Tab Take 2 tablets (1,000 mg total) by mouth 3 (three) times daily as needed (muscle spasms or pain). May cause drowsiness. 30 tablet 2/24/2025 -- Hernando aJrrett ACNP          Follow-up Information       Follow up With Specialties Details Why Contact Info    Clyde Hyatt MD Internal Medicine In 3 days  [address] [facility] [id number] [phone]      [facility] Emergency Medicine  If symptoms worsen [address] [facility] [address] [phone]               [1]   Social History  Tobacco Use    Smoking status: Never    Smokeless tobacco: Never   Substance Use Topics    Alcohol use: Never    Drug use: Never        Hernando Jarrett ACNP  02/24/25 [id number]

## 2025-03-18 ENCOUNTER — OFFICE VISIT (OUTPATIENT)
Dept: OPHTHALMOLOGY | Facility: CLINIC | Age: 60
End: 2025-03-18
Payer: COMMERCIAL

## 2025-03-18 VITALS — WEIGHT: 250 LBS | BODY MASS INDEX: 37.89 KG/M2 | HEIGHT: 68 IN

## 2025-03-18 DIAGNOSIS — D23.122 APOCRINE HIDROCYSTOMA OF LEFT LOWER EYELID: Primary | ICD-10-CM

## 2025-03-18 DIAGNOSIS — E11.9 DIABETES MELLITUS TYPE 2 WITHOUT RETINOPATHY: ICD-10-CM

## 2025-03-18 PROCEDURE — 99215 OFFICE O/P EST HI 40 MIN: CPT | Mod: PBBFAC,PN

## 2025-03-18 PROCEDURE — 92134 CPTRZ OPH DX IMG PST SGM RTA: CPT | Mod: PBBFAC,PN

## 2025-03-18 NOTE — PROGRESS NOTES
HPI    RTC 1 YR Annual DFE  OCT/[facility]/MRX done today, No dilation-patient has to go back to work.   Patient states he would like to have the cyst removed on left eye.   Last edited by Angelica Arceo on 3/18/2025  9:31 AM.              Assessment /Plan     For exam results, see Encounter Report.    Diabetes mellitus due to underlying condition with hyperosmolarity without coma, without long-term current use of insulin  -     Ambulatory referral/consult to Ophthalmology      Assessment/Plan   1. Apocrine hidrocystoma left lateral canthus  - Last year had right sided apocrine hidrocystoma removed without complication or recurrence  - Lesion on left lid has been slowly growing over 9 years, now bothersome and would like it removed  - Seen with Dr. Grant, pt may benefit from having excision done in OR as procedure may involve skin flap, but OR is not necessary. Patient was previously scheduled to see Dr. Grant on 11/2022, but cancelled it due to insurance reasons  - 3/18/25: Patient seen with Dr. Oleary.    2. NSC, bilateral  - Not visually significant  - Continue to monitor    3. Myopia bilateral  - Mrx today to 20/20     4. DM without retinopathy  - Last A1c as below:  Hemoglobin A1c   Date Value Ref Range Status   02/20/2025 5.6 <=7.0 % Final   01/27/2025 5.5 <=7.0 % Final   07/10/2024 5.7 <=7.0 % Final   - Signs of retinopathy seen on exam: None   - Encouraged good BS/BP/Cholesterol control, f/u with PCP regularly  - 3/18/25: Patient deferring dilation today, has to go to work afterwards. No DBR seen on [employer]. Will have patient RTC in 2-3 months when available for DFE    RTC 2-3 months for DFE

## 2025-03-20 DIAGNOSIS — D23.122 APOCRINE HIDROCYSTOMA OF LEFT LOWER EYELID: Primary | ICD-10-CM

## 2025-03-25 ENCOUNTER — DOCUMENT SCAN (OUTPATIENT)
Dept: HOME HEALTH SERVICES | Facility: HOSPITAL | Age: 60
End: 2025-03-25
Payer: COMMERCIAL

## 2025-04-03 ENCOUNTER — OFFICE VISIT (OUTPATIENT)
Dept: ORTHOPEDICS | Facility: CLINIC | Age: 60
End: 2025-04-03
Payer: COMMERCIAL

## 2025-04-03 VITALS
HEART RATE: 63 BPM | SYSTOLIC BLOOD PRESSURE: 107 MMHG | WEIGHT: 250 LBS | OXYGEN SATURATION: 100 % | BODY MASS INDEX: 37.89 KG/M2 | TEMPERATURE: 98 F | RESPIRATION RATE: 20 BRPM | DIASTOLIC BLOOD PRESSURE: 71 MMHG | HEIGHT: 68 IN

## 2025-04-03 DIAGNOSIS — M75.41 ROTATOR CUFF IMPINGEMENT SYNDROME OF RIGHT SHOULDER: Primary | ICD-10-CM

## 2025-04-03 DIAGNOSIS — M75.111 NONTRAUMATIC INCOMPLETE TEAR OF RIGHT ROTATOR CUFF: ICD-10-CM

## 2025-04-03 PROCEDURE — 99215 OFFICE O/P EST HI 40 MIN: CPT | Mod: PBBFAC | Performed by: SURGERY

## 2025-04-03 PROCEDURE — 20611 DRAIN/INJ JOINT/BURSA W/US: CPT | Mod: PBBFAC | Performed by: SURGERY

## 2025-04-03 RX ORDER — LIDOCAINE HYDROCHLORIDE 10 MG/ML
5 INJECTION, SOLUTION EPIDURAL; INFILTRATION; INTRACAUDAL; PERINEURAL
Status: COMPLETED | OUTPATIENT
Start: 2025-04-03 | End: 2025-04-03

## 2025-04-03 RX ORDER — TRIAMCINOLONE ACETONIDE 40 MG/ML
40 INJECTION, SUSPENSION INTRA-ARTICULAR; INTRAMUSCULAR ONCE
Status: COMPLETED | OUTPATIENT
Start: 2025-04-03 | End: 2025-04-03

## 2025-04-03 RX ORDER — MELOXICAM 15 MG/1
15 TABLET ORAL DAILY
Qty: 30 TABLET | Refills: 2 | Status: SHIPPED | OUTPATIENT
Start: 2025-04-03 | End: 2025-07-02

## 2025-04-03 RX ADMIN — TRIAMCINOLONE ACETONIDE 40 MG: 40 SUSPENSION INTRA-ARTERIAL; INTRAMUSCULAR at 11:04

## 2025-04-03 RX ADMIN — LIDOCAINE HYDROCHLORIDE 50 MG: 10 INJECTION, SOLUTION EPIDURAL; INFILTRATION; INTRACAUDAL; PERINEURAL at 11:04

## 2025-04-03 NOTE — PROGRESS NOTES
"Subjective:    Patient ID: Chadwick Lorenzo is a right handed 59 y.o. male  who presented to [facility] for follow up..      Chief Complaint: Pain of the Right Shoulder (Patient presents to clinic to be seen for right shoulder pain.pt stated he came in last September to receive and injection which the patient stated a month ago he must ve did something to aggravate it. At home therapy treatments includes OTC medication and heat with minimal relief./)      History of Present Illness:  Pain        Chadwick Lorenzo who has a history of right rotator cuff tear s/p repair many years ago,  presented today for follow-up of right shoulder pain today. About 7 months of R shoulder pain started without injuries or trauma. His pain is worse with overhead / reaching, localized at deltoid. He was seen in this clinic on 9/24/24 and received a R subacromial CSI which provided pain relief till about 1-2 weeks ago. Denies any weakness, stiffness. He would like some pain relief today.    Shoulder Review of Systems:  Swelling?  no  Instability?  no  Clicking?  no  Limited ROM? no  Fever/Chills? no  Subluxation? no  Dislocation? no    Current Choice of Exercise:  none    ROS       Objective:      Physical Exam:    /71   Pulse 63   Temp 98.4 °F (36.9 °C) (Oral)   Resp 20   Ht 5' 8" (1.727 m)   Wt 113.4 kg (250 lb)   SpO2 100%   BMI 38.01 kg/m²     Ortho/SPM Exam    Appearance:  Soft tissue swelling: Left: no Right: no  Effusion: Left:  Negative Right: Negative  Erythema: Left no Right: no  Ecchymosis: Left: no Right: no  Atrophy: Left: no Right: no  Scapular winging: Left: no Right: no    Palpation:  Shoulder Tenderness: Left: none  Right: supraspinatus, infraspinatus    Range of motion:  Flexion (0-90): Left:  90 Right: 90  Abduction (0-180): Left:  180 Right: 180  External rotation (0-55): Left: 55 Right: 55  Internal rotation (0-45): Left: 45 Right: " 10    Strength:  Abduction: Left: 5/5 Pain: no Right: 4/5 Pain: yes  External rotation: Left: 5/5 Pain: no Right: 4/5 Pain: yes  Internal rotation: Left: 5/5 Pain: no Right: 5/5 Pain: no  Elbow flexion: Left: 5/5 Pain: no Right: 5/5 Pain: no  Elbow extension: Left: 5/5 Pain: no Right: 5/5 Pain: no    Special Tests:  Subacromial Impingement  Neer: Left: Negative Right: Positive  Zavaleta: Left: Negative Right: Positive    AC Joint Arthritis:  Cross-body abduction: Left: Negative Right: Negative    Rotator Cuff Tear   Drop arm test: Left: Negative Right: Negative  Hornblower: Left: Left: Not performed Right: Not performed   Belly press test: Left: Negative Right: Negative  Jose Armando test (Empty can): Left: Negative Right: Negative    Biceps tendon/Labral tear  Speed's: Left: Negative Right: Negative  Yergason's: Left: Negative Right: Negative  O'Deniz's: Left: Negative Right: Negative  Cranck test: Left: Negative Right: Negative    Stability   Sulcus sign: Left: Not performed Right: Not performed   Apprehension test: Left: Not performed Right: Not performed   Relocation test: Left: Not performed Right: Not performed     Cervical   Spurling: Left: Negative Right: Negative    AIN/PIN/Ulnar nerve: Intact and symmetric    General appearance: NAD  Peripheral pulses: normal bilaterally   Reflexes: Left: Not performed Right: Not performed   Sensation: normal    Labs:  Last A1c: 5.6     Imaging:   Previous images reviewed.  X-rays ordered and performed today: no  # of views: 3 Laterality: right  My Interpretation:  chronic right distal clavicle deformity, no acute abnormalities      Assessment:        Encounter Diagnoses   Code Name Primary?    M75.41 Rotator cuff impingement syndrome of right shoulder Yes    M75.111 Nontraumatic incomplete tear of right rotator cuff         Plan:           Orders Placed This Encounter   Procedures    Large Joint Aspiration/Injection: R subacromial bursa     This order was created via procedure  documentation    ULS US Guidance for Needle Placement     Release to patient:   Immediate     Medications Ordered This Encounter   Medications    LIDOcaine (PF) 10 mg/ml (1%) injection 50 mg    meloxicam (MOBIC) 15 MG tablet     Sig: Take 1 tablet (15 mg total) by mouth once daily. Please request refill of this medication from your PCP.     Dispense:  30 tablet     Refill:  2     Please request refill of this medication from your PCP.    triamcinolone acetonide injection 40 mg       MDM: Prior external referring provider notes reviewed. Prior external referring provider studies reviewed.   Dx: right. Rotator cuff impingement, right shoulder Partial rotator cuff tear, acute with moderate exacerbation     Treatment Plan: Discussed with patient diagnosis and treatment recommendations. Natural history and expected course discussed. Questions answered.  Educational material distributed.  Reduction in offending activity.  Gentle ROM exercises.  Rest, ice, compression, and elevation (RICE) therapy.  Plain film x-rays.  Home physical therapy exercise handouts provided to patient.   Over the counter NSAID and/or tylenol provided you do not have contraindications such as but not limited to liver or kidney disease or uncontrolled blood pressure. If you're doctors have told you to to not take them based on your health, do not take them.   Imaging: prior radiological studies independently reviewed; discussed with patient; agree with radiologist interpretation.   Procedure: Discussed CSI/VSI as treatment options; discussed injections as treatment options; since conservative measures did not improve symptoms patient consented for CSI today.  Therapy: No formal therapy  Medication: START meloxicam (Mobic 15 mg daily). Please see your primary care physician for further refills.  RTC: 3 months.         Large Joint Aspiration/Injection: R subacromial bursa    Date/Time: 4/3/2025 9:00 AM    Performed by: Kennedy Staples MD  Authorized  by: Kennedy Staples MD    Consent Done?:  Yes (Written)  Indications:  Pain  Site marked: the procedure site was marked    Timeout: prior to procedure the correct patient, procedure, and site was verified      Local anesthesia used?: Yes    Local anesthetic:  Topical anesthetic    Details:  Needle Size:  25 G  Ultrasonic Guidance for needle placement?: Yes    Images are saved and documented.  Approach:  Posterior  Location:  Shoulder  Site:  R subacromial bursa  Patient tolerance:  Patient tolerated the procedure well with no immediate complications     Staff Attending: Kennedy Staples MD    Risks:  Possible complications with the injection include bleeding, infection (.01%), tendon rupture, steroid flare, fat pad or soft tissue atrophy, skin depigmentation, allergic reaction to medications and vasovagal response. (steroid flare treatment is rest, ice, NSAIDs and resolves in 24-36 hours.)    Consent:  No absolute contraindications (cellulitis overlying joint, infection, lack of informed consent, allergy to injection medication, AVN protein or egg allergy for sodium hyaluronate, or history of steroid flare) or relative contraindications (uncontrolled DM2 A1c>10, coagulopathy, INR > 3.5, previous joint replacement or history of AVN).        Description:  The patient was prepped in normal sterile fashion use of chlorhexidine scrub and the appropriate and anatomic landmarks were identified with ultrasound as image guidance. The patient was evaluated with a [employer] ultrasound machine using a 10 MHz linear probe, following a standard protocol. Ultrasound imaging confirmed placement of the needle in the correct position, with reference to surrounding anatomic structures. Dynamic visualization of the needle was continuous throughout the procedure(s) and maintained good position. Care was taken to ensure there was unrestricted flow of syringe contents (listed below) into the site of injection. The ultrasound image for  needle placement was captured and saved in the patient's medical record.       Indication for use of Ultrasound Guidance: The indication for the use of ultrasound was to ensure accurate localization of needle for aspiration and/or injection, and minimize risk of damage to surrounding structures. Jagdish EL, Brijesh S, Rizwana LJ, Ho BJ. Improving injection accuracy of the elbow, knee, and shoulder: does injection site and imaging make a difference? A systematic review. Am J Sports Med. 2011 Mar;39(3):656-62. doi: 10.1177/6576478891654671. Epub 2011 Jan 21. PMID: 28484082.    Body mass index is 38.01 kg/m².     Contents of syringe included: 5mL of 1% of lidocaine with 40mg of Kenalog (1mL of 40mg/mL)    Post Procedure: Patient alert, and moving all extremities. ROM improved, pain decreased.  Good peripheral pulses, no signs of vascular compromise and range of motion intact.  Aftercare instructions were given to patient at time of discharge.  Relative rest for 3 days-avoiding excess activity.  Place ice on the area for 15 minutes every 4-6 hours. Patient may take Tylenol a 1000 mg b.i.d. or ibuprofen 600 mg t.i.d. for the next 3-4 days if not on medication already and safe to take pending co-morbidities.  Protect the area for the next 1-8 hours if anesthetic was used.  Avoid excessive activity for the next 3-4 weeks.  ER precautions given for fever, severe joint pain or allergic reaction or other new symptoms related to the joint injection.            Kennedy Staples MD  Sports Medicine

## 2025-06-27 DIAGNOSIS — J45.20 MILD INTERMITTENT ASTHMA WITHOUT COMPLICATION: Primary | ICD-10-CM

## 2025-06-27 RX ORDER — ALBUTEROL SULFATE 0.63 MG/3ML
0.63 SOLUTION RESPIRATORY (INHALATION) EVERY 6 HOURS PRN
Qty: 75 ML | Refills: 0 | Status: SHIPPED | OUTPATIENT
Start: 2025-06-27 | End: 2026-06-27

## 2025-07-09 NOTE — PROGRESS NOTES
"Subjective:    Patient ID: Chadwick Lorenzo is a right handed 59 y.o. male  who presented to [facility] for follow up..      Chief Complaint: Pain of the Right Shoulder      History of Present Illness:  Chadwick Lorenzo  is a 59-year-old female who presents to clinic today for right shoulder pain that has been going on for the past 6 months.  He reports that he has had a couple of subacromial subdeltoid injections with the provide significant relief in his symptoms.  He has been going to formal physical therapy which is also improvement in his symptoms as well.  Today he reports his pain as a 4/10 worse with any activity overhead.  He also has some pain with reaching across his body.  He describes the pain as dull and achy worse with overhead activity.  Symptoms are improving with oral and topical medications at this time.  He would like another corticosteroid injection if possible.    Prior to injection pain was a 4/10.  After injection pain was a 0/10.  Shoulder Review of Systems:  Swelling?  no  Instability?  no  Clicking?  no  Limited ROM? yes  Fever/Chills? no  Subluxation? no  Dislocation? no    Comorbid Conditions  HTN         Objective:      Physical Exam:    /74   Pulse 68   Ht 5' 8" (1.727 m)   Wt 113 kg (249 lb 1.9 oz)   BMI 37.88 kg/m²     Ortho/SPM Exam    Appearance:  Soft tissue swelling: Left: no Right: no  Effusion: Left:  Negative Right: Negative  Erythema: Left no Right: no  Ecchymosis: Left: no Right: no  Atrophy: Left: no Right: no  Scapular winging: Left: no Right: no    Palpation:  Shoulder Tenderness: Left: none  Right: proximal humerus    Range of motion:  Flexion (0-90): Left:  90 Right: 90  Abduction (0-180): Left:  180 Right: 180  External rotation (0-55): Left: 55 Right: 55  Internal rotation (0-45): Left: 45 Right: 45    Strength:  Abduction: Left: 5/5 Pain: no Right: 5/5 Pain: yes  External rotation: Left: 5/5 Pain: no Right: " 5/5 Pain: no  Internal rotation: Left: 5/5 Pain: no Right: 5/5 Pain: no  Elbow flexion: Left: 5/5 Pain: no Right: 5/5 Pain: no  Elbow extension: Left: 5/5 Pain: no Right: 5/5 Pain: no    Special Tests:  Subacromial Impingement  Neer: Left: Negative Right: Negative  Zavaleta: Left: Negative Right: Positive    AC Joint Arthritis:  Cross-body abduction: Left: Negative Right: Negative    Rotator Cuff Tear   Drop arm test: Left: Negative Right: Negative  Hornblower: Left: Left: Not performed Right: Not performed   Belly press test: Left: Negative Right: Negative  Jose Armando test (Empty can): Left: Negative Right: Positive    Biceps tendon/Labral tear  Speed's: Left: Negative Right: Negative  Yergason's: Left: Negative Right: Negative    Cervical   Spurling: Left: Negative Right: Negative    AIN/PIN/Ulnar nerve: Intact and symmetric    General appearance: NAD  Peripheral pulses: normal bilaterally   Reflexes: Left: Not performed Right: Not performed   Sensation: normal    Labs:  Last A1c: 5.6     Imaging:   Previous images reviewed.  X-rays ordered and performed today: no    Assessment:        Encounter Diagnoses   Code Name Primary?    M75.41 Rotator cuff impingement syndrome of right shoulder Yes        Plan:     Dx:  Right rotator cuff insufficiency, moderate exacerbation  Treatment Plan:Discussed with patient diagnosis and treatment recommendations. Handout given. Recommend conservative treatment to include: avoidance of aggravating activity, significant modification of daily activities, hot/cold therapies, topical and oral medications, braces, HEP/PT/OT, and injections.   Patient with a right shoulder impingement syndrome.  We will give patient a subacromial subdeltoid injection at this time.  Counseled patient to continue using oral topical medications for pain relief.  Counseled patient to continue doing his home exercises and formal physical therapy.  We will see patient back as needed.  Imaging: prior radiological  studies independently reviewed; discussed with patient; agree with radiologist interpretation.   Procedure: Discussed CSI as treatment options; discussed injections as treatment options; since conservative measures did not improve symptoms patient consented for CSI today.  Therapy: Physical Therapy  Medication: CONTINUE over-the-counter acetaminophen (Tylenol 1000 mg three times per day as needed)  CONTINUE Voltaren Gel 1% as prescribed  CONTINUE over-the-counter NSAIDs (ibuprofen 200mg three tablets three times a day as needed). Please see your primary care physician for further refills.  RTC: PRN; call if any issues.         Large Joint Aspiration/Injection: R subacromial bursa    Date/Time: 7/10/2025 7:30 AM    Performed by: Yony Allan,   Authorized by: Yony Allan, DO    Consent Done?:  Yes (Written)  Indications:  Arthritis and pain    Local anesthesia used?: Yes    Local anesthetic:  Topical anesthetic    Details:  Needle Size:  21 G  Ultrasonic Guidance for needle placement?: Yes    Images are saved and documented.  Approach:  Posterior  Location:  Shoulder  Site:  R subacromial bursa  Patient tolerance:  Patient tolerated the procedure well with no immediate complications    Staff Attending: Kennedy Staples MD    Risks:  Possible complications with the injection include bleeding, infection (.01%), tendon rupture, steroid flare, fat pad or soft tissue atrophy, skin depigmentation, allergic reaction to medications and vasovagal response. (steroid flare treatment is rest, ice, NSAIDs and resolves in 24-36 hours.)    Consent:  No absolute contraindications (cellulitis overlying joint, infection, lack of informed consent, allergy to injection medication, AVN protein or egg allergy for sodium hyaluronate, or history of steroid flare) or relative contraindications (uncontrolled DM2 A1c>10, coagulopathy, INR > 3.5, previous joint replacement or history of AVN).        Description:  The patient was prepped in  normal sterile fashion use of chlorhexidine scrub and the appropriate and anatomic landmarks were identified with ultrasound as image guidance. The patient was evaluated with a [employer] ultrasound machine using a 10 MHz linear probe, following a standard protocol. Ultrasound imaging confirmed placement of the needle in the correct position, with reference to surrounding anatomic structures. Dynamic visualization of the needle was continuous throughout the procedure(s) and maintained good position. Care was taken to ensure there was unrestricted flow of syringe contents (listed below) into the site of injection. The ultrasound image for needle placement was captured and saved in the patient's medical record.  .       Indication for use of Ultrasound Guidance: The indication for the use of ultrasound was to ensure accurate localization of needle for aspiration and/or injection, and minimize risk of damage to surrounding structures. Jagdish EL, Brijesh S, Rizwana LJ, Ho HUNTER. Improving injection accuracy of the elbow, knee, and shoulder: does injection site and imaging make a difference? A systematic review. Am J Sports Med. 2011 Mar;39(3):656-62. doi: 10.1177/[id number]. Epub 2011 Jan 21. PMID: 80604287.    Body mass index is 37.88 kg/m².    Contents of syringe included: 5mL of 1% lidocaine with 40mg of Depo-Medrol (1mL of 40mg/mL)    Post Procedure: Patient alert, and moving all extremities. ROM improved, pain decreased.  Good peripheral pulses, no signs of vascular compromise and range of motion intact.  Aftercare instructions were given to patient at time of discharge.  Relative rest for 3 days-avoiding excess activity.  Place ice on the area for 15 minutes every 4-6 hours. Patient may take Tylenol a 1000 mg b.i.d. or ibuprofen 600 mg t.i.d. for the next 3-4 days if not on medication already and safe to take pending co-morbidities.  Protect the area for the next 1-8 hours if anesthetic was used.  Avoid excessive  activity for the next 3-4 weeks.  ER precautions given for fever, severe joint pain or allergic reaction or other new symptoms related to the joint injection.         Yony Allan D.O.  Sports Medicine Fellow

## 2025-07-10 ENCOUNTER — OFFICE VISIT (OUTPATIENT)
Dept: ORTHOPEDICS | Facility: CLINIC | Age: 60
End: 2025-07-10
Payer: COMMERCIAL

## 2025-07-10 VITALS
WEIGHT: 249.13 LBS | DIASTOLIC BLOOD PRESSURE: 74 MMHG | BODY MASS INDEX: 37.76 KG/M2 | HEART RATE: 68 BPM | HEIGHT: 68 IN | SYSTOLIC BLOOD PRESSURE: 106 MMHG

## 2025-07-10 DIAGNOSIS — M75.41 ROTATOR CUFF IMPINGEMENT SYNDROME OF RIGHT SHOULDER: Primary | ICD-10-CM

## 2025-07-10 PROCEDURE — 20611 DRAIN/INJ JOINT/BURSA W/US: CPT | Mod: PBBFAC | Performed by: STUDENT IN AN ORGANIZED HEALTH CARE EDUCATION/TRAINING PROGRAM

## 2025-07-10 PROCEDURE — 99214 OFFICE O/P EST MOD 30 MIN: CPT | Mod: PBBFAC | Performed by: STUDENT IN AN ORGANIZED HEALTH CARE EDUCATION/TRAINING PROGRAM

## 2025-07-10 RX ORDER — LIDOCAINE HYDROCHLORIDE 10 MG/ML
5 INJECTION, SOLUTION EPIDURAL; INFILTRATION; INTRACAUDAL; PERINEURAL
Status: COMPLETED | OUTPATIENT
Start: 2025-07-10 | End: 2025-07-10

## 2025-07-10 RX ORDER — METHYLPREDNISOLONE ACETATE 40 MG/ML
40 INJECTION, SUSPENSION INTRA-ARTICULAR; INTRALESIONAL; INTRAMUSCULAR; SOFT TISSUE
Status: COMPLETED | OUTPATIENT
Start: 2025-07-10 | End: 2025-07-10

## 2025-07-10 RX ADMIN — LIDOCAINE HYDROCHLORIDE 50 MG: 10 INJECTION, SOLUTION EPIDURAL; INFILTRATION; INTRACAUDAL; PERINEURAL at 08:07

## 2025-07-10 RX ADMIN — METHYLPREDNISOLONE ACETATE 40 MG: 40 INJECTION, SUSPENSION INTRA-ARTICULAR; INTRALESIONAL; INTRAMUSCULAR; SOFT TISSUE at 08:07

## 2025-07-14 NOTE — PROGRESS NOTES
Faculty Attestation: Chadwick Lorenzo  was seen in Sports Medicine Clinic Discussed with Dr. Allan at the time of the visit. History of Present Illness, Physical Exam, and Assessment and Plan reviewed.     Treatment plan is reasonable and appropriate. Compliance with treatment recommendations is important.      No imaging studies were done today.     Procedure note reviewed. Present for entire procedure with the fellow. Patient tolerated procedure well.     Kennedy Staples MD  Sports Medicine

## 2025-07-17 DIAGNOSIS — E11.00 TYPE 2 DIABETES MELLITUS WITH HYPEROSMOLARITY WITHOUT COMA, UNSPECIFIED WHETHER LONG TERM INSULIN USE: Primary | ICD-10-CM

## 2025-07-17 RX ORDER — TIRZEPATIDE 15 MG/.5ML
15 INJECTION, SOLUTION SUBCUTANEOUS
Qty: 2 ML | Refills: 2 | Status: SHIPPED | OUTPATIENT
Start: 2025-07-17 | End: 2025-10-03

## 2025-07-18 ENCOUNTER — OFFICE VISIT (OUTPATIENT)
Dept: CARDIOLOGY | Facility: CLINIC | Age: 60
End: 2025-07-18
Payer: COMMERCIAL

## 2025-07-18 ENCOUNTER — HOSPITAL ENCOUNTER (OUTPATIENT)
Dept: RADIOLOGY | Facility: HOSPITAL | Age: 60
Discharge: HOME | End: 2025-07-18
Attending: INTERNAL MEDICINE
Payer: COMMERCIAL

## 2025-07-18 VITALS
WEIGHT: 236 LBS | DIASTOLIC BLOOD PRESSURE: 80 MMHG | HEART RATE: 72 BPM | HEIGHT: 68 IN | RESPIRATION RATE: 18 BRPM | BODY MASS INDEX: 35.77 KG/M2 | OXYGEN SATURATION: 99 % | TEMPERATURE: 97 F | SYSTOLIC BLOOD PRESSURE: 99 MMHG

## 2025-07-18 DIAGNOSIS — I25.10 CORONARY ARTERY DISEASE INVOLVING NATIVE CORONARY ARTERY OF NATIVE HEART WITHOUT ANGINA PECTORIS: Primary | ICD-10-CM

## 2025-07-18 DIAGNOSIS — I15.2 HYPERTENSION ASSOCIATED WITH TYPE 2 DIABETES MELLITUS: Chronic | ICD-10-CM

## 2025-07-18 DIAGNOSIS — E78.2 MIXED DIABETIC HYPERLIPIDEMIA ASSOCIATED WITH TYPE 2 DIABETES MELLITUS: ICD-10-CM

## 2025-07-18 DIAGNOSIS — E11.69 MIXED DIABETIC HYPERLIPIDEMIA ASSOCIATED WITH TYPE 2 DIABETES MELLITUS: ICD-10-CM

## 2025-07-18 DIAGNOSIS — I42.8 NONISCHEMIC CARDIOMYOPATHY: ICD-10-CM

## 2025-07-18 DIAGNOSIS — E11.59 HYPERTENSION ASSOCIATED WITH TYPE 2 DIABETES MELLITUS: Chronic | ICD-10-CM

## 2025-07-18 DIAGNOSIS — E78.2 MIXED HYPERLIPIDEMIA: Chronic | ICD-10-CM

## 2025-07-18 DIAGNOSIS — I10 PRIMARY HYPERTENSION: Chronic | ICD-10-CM

## 2025-07-18 DIAGNOSIS — R05.1 ACUTE COUGH: Primary | ICD-10-CM

## 2025-07-18 DIAGNOSIS — R05.1 ACUTE COUGH: ICD-10-CM

## 2025-07-18 PROCEDURE — 71046 X-RAY EXAM CHEST 2 VIEWS: CPT | Mod: TC

## 2025-07-18 PROCEDURE — 99215 OFFICE O/P EST HI 40 MIN: CPT | Mod: PBBFAC | Performed by: NURSE PRACTITIONER

## 2025-07-18 RX ORDER — METOPROLOL SUCCINATE 25 MG/1
25 TABLET, EXTENDED RELEASE ORAL DAILY
Qty: 90 TABLET | Refills: 3 | Status: SHIPPED | OUTPATIENT
Start: 2025-07-18 | End: 2026-07-18

## 2025-07-18 RX ORDER — SACUBITRIL AND VALSARTAN 24; 26 MG/1; MG/1
1 TABLET, FILM COATED ORAL 2 TIMES DAILY
Qty: 180 TABLET | Refills: 3 | Status: SHIPPED | OUTPATIENT
Start: 2025-07-18 | End: 2026-07-18

## 2025-07-18 NOTE — PROGRESS NOTES
CHIEF COMPLAINT:   Chief Complaint   Patient presents with    Follow-up     Denies any cardiac problems                     Review of patient's allergies indicates:  No Known Allergies                                       HPI:  Chadwick Lorenzo 59 y.o. male with NICM with recovered EF 60% per ECHO Jan. 2023, Nonobstructive CAD per [facility] 8.1.22, HLD,  DM II, who presents to cardiology clinic for routine follow-up and ongoing care.   Latest Echocardiogram obtained on 1.13.23 revealed an ejection fraction of 60%, which is recovered from previous EF of 25% per ECHO June 2022.  Most recent ischemic evaluation includes a Left Heart Catheterization on 8.1.22 that revealed moderate CAD, 60% to 1st Mrg. (See reports below).    Today the patient he feels well from a cardiac standpoint.  However, he does endorse acute sinus issues that reportedly began over the weekend while working outside.  The patient states that he feels congested but endorses minimal sputum production.  Denies any fever or chills.  He has a chest x-ray scheduled for latest today for further evaluation.  On exam, the patient appears euvolemic and denies any symptoms of volume overload including shortness of breath, orthopnea, PND, lower extremity edema, abdominal distention or early satiety.   He denies other cardiovascular complaints of chest pain, palpitations, lightheadedness, near-syncope or syncope.  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     CARDIAC TESTING:  Carotid US 7.26.24  The right internal carotid artery demonstrated no hemodynamically significant stenosis.  The left internal carotid artery demonstrated no hemodynamically significant stenosis.  There was no substantial plaque identified on this scan.  The bilateral vertebral arteries were patent with antegrade flow.    ECHO 1.13.23  The left ventricle is normal in size with  concentric remodeling and normal systolic function.  The estimated ejection fraction is 60%.  Normal left ventricular diastolic function.  Normal right ventricular size with normal right ventricular systolic function.  Mild aortic regurgitation.  Normal central venous pressure (3 mmHg).       Echo 6.22.22    Interpretation Summary  · Concentric hypertrophy and severely decreased systolic function.  · The estimated ejection fraction is 25%.  · Grade I left ventricular diastolic dysfunction.  · Mild aortic regurgitation.  · Normal right ventricular size with normal right ventricular systolic function.  · Intermediate central venous pressure (8 mmHg).  · The estimated PA systolic pressure is 16 mmHg.  · There is severe left ventricular global hypokinesis.    Cardiac Catheterization 8.1.22    Conclusion  · The pre-procedure left ventricular end diastolic pressure was 7.  · The 1st Mrg lesion was 60% stenosed.  · The estimated blood loss was none.  · There was non-obstructive coronary artery disease..    FINDINGS  The patient has Moderate CAD  Blood loss:  less than 10 cc.    RECOMMENDATIONS  Medical management  Maximize GDMT for patient's cardiomyopathy. Avoid cardiac toxins.  Risk factor modifications  Activity -- avoid straining with affected limb for one week  Exercise on regular basis.    EKG done 6/23  Normal sinus rhythm, rate 60. Normal ECG       Past Medical History:   has a past medical history of CHF (congestive heart failure), Diabetes mellitus, type 2, IGT (impaired glucose tolerance), Kidney stones, Low testosterone in male, Migraine, Morbid obesity, and Vitamin D deficiency. HLD    Past Surgical History:   has a past surgical history that includes rotator cuff surgery and kidney stones removal.     Family History:  family history includes Cancer in his mother; Heart disease in his father.     Social History:   reports that he has never smoked. He has never used smokeless tobacco. He reports previous  alcohol use. He reports that he does not use drugs.                                                                                                                                                                                                                                                                                         Patient Active Problem List   Diagnosis    Nocturia    Fatigue    IGT (impaired glucose tolerance)    Asthma    Low testosterone    Mixed diabetic hyperlipidemia associated with type 2 diabetes mellitus    Type 2 diabetes mellitus with hyperglycemia, without long-term current use of insulin    Nonischemic cardiomyopathy    BMI 38.0-38.9,adult    Screen for colon cancer    FH: colon cancer in relative diagnosed at >50 years old    Hx of non anemic vitamin B12 deficiency    Immunization due    Thunderclap headache    Headache    Migraine with aura    Mild intermittent asthma    Class 2 severe obesity due to excess calories with serious comorbidity and body mass index (BMI) of 38.0 to 38.9 in adult    Seasonal allergies    Severe acute respiratory syndrome coronavirus 2 (SARS-CoV-2) vaccination not indicated    Primary hypertension    Mixed hyperlipidemia    Vitamin D deficiency    Hypertension associated with type 2 diabetes mellitus    Epigastric abdominal pain     Past Surgical History:   Procedure Laterality Date    ANGIOGRAM, CORONARY, WITH LEFT HEART CATHETERIZATION N/A 08/01/2022    Procedure: Angiogram, Coronary, with Left Heart Cath;  Surgeon: Lev Manuel MD;  Location: [facility] CATH LAB;  Service: Cardiology;  Laterality: N/A;    CHOLECYSTECTOMY  1/27/2025    Procedure: CHOLECYSTECTOMY;  Surgeon: Ayden Escobedo Jr., MD;  Location: [facility] OR;  Service: General;;    kidney stones removal      LAPAROSCOPIC CHOLECYSTECTOMY N/A 1/27/2025    Procedure: CHOLECYSTECTOMY, LAPAROSCOPIC;  Surgeon: Ayden Escobedo Jr., MD;  Location: [facility] OR;  Service: General;  Laterality: N/A;     rotator cuff surgery       Social History     Socioeconomic History    Marital status:     Number of children: 2   Occupational History    Occupation: medical assistant    Occupation: Medical Assistant     Employer: [employer]     Comment: [employer]   Tobacco Use    Smoking status: Never    Smokeless tobacco: Never   Substance and Sexual Activity    Alcohol use: Never    Drug use: Never    Sexual activity: Yes     Partners: Female     Birth control/protection: None     Social Drivers of Health     Financial Resource Strain: Low Risk  (1/27/2025)    Overall Financial Resource Strain (CARDIA)     Difficulty of Paying Living Expenses: Not hard at all   Food Insecurity: No Food Insecurity (1/27/2025)    Hunger Vital Sign     Worried About Running Out of Food in the Last Year: Never true     Ran Out of Food in the Last Year: Never true   Transportation Needs: No Transportation Needs (1/27/2025)    TRANSPORTATION NEEDS     Transportation : No   Physical Activity: Unknown (10/11/2023)    Exercise Vital Sign     Minutes of Exercise per Session: 20 min   Stress: No Stress Concern Present (1/27/2025)    [employer] - Occupational Stress Questionnaire     Feeling of Stress : Only a little   Housing Stability: Unknown (1/27/2025)    Housing Stability Vital Sign     Unable to Pay for Housing in the Last Year: No     Homeless in the Last Year: No        Family History   Problem Relation Name Age of Onset    Cancer Mother      Heart disease Father      No Known Problems Sister      No Known Problems Brother           Current Outpatient Medications:     acetaminophen (TYLENOL) 325 MG tablet, Take 2 tablets (650 mg total) by mouth every 8 (eight) hours as needed for Pain., Disp: 30 tablet, Rfl: 0    albuterol (ACCUNEB) 0.63 mg/3 mL Nebu, Take 3 mLs (0.63 mg total) by nebulization every 6 (six) hours as needed (wheezng). Rescue, Disp: 75 mL, Rfl: 0    aspirin (ECOTRIN) 81 MG EC tablet, Take 1 tablet (81 mg  total) by mouth once daily., Disp: 90 tablet, Rfl: 3    atorvastatin (LIPITOR) 40 MG tablet, Take 1 tablet (40 mg total) by mouth once daily., Disp: 90 tablet, Rfl: 3    empagliflozin (JARDIANCE) 10 mg tablet, Take 1 tablet (10 mg total) by mouth once daily., Disp: 90 tablet, Rfl: 3    fluticasone propionate (FLONASE) 50 mcg/actuation nasal spray, 1 spray (50 mcg total) by Each Nostril route daily as needed for Rhinitis., Disp: 9.9 mL, Rfl: 0    metFORMIN (GLUCOPHAGE) 500 MG tablet, Take 1 tablet (500 mg total) by mouth 2 (two) times daily with meals., Disp: 60 tablet, Rfl: 3    metoprolol succinate (TOPROL-XL) 25 MG 24 hr tablet, Take 1 tablet (25 mg total) by mouth once daily., Disp: 90 tablet, Rfl: 3    NURTEC 75 mg odt, Take 1 tablet (75 mg total) by mouth daily as needed for Migraine., Disp: 8 tablet, Rfl: 1    sacubitriL-valsartan (ENTRESTO) 24-26 mg per tablet, Take 1 tablet by mouth 2 (two) times daily., Disp: 180 tablet, Rfl: 3    sildenafiL (VIAGRA) 50 MG tablet, Take 1 tablet (50 mg total) by mouth daily as needed for Erectile Dysfunction., Disp: 10 tablet, Rfl: 2    tirzepatide (MOUNJARO) 15 mg/0.5 mL PnIj, Inject 15 mg into the skin every 7 days. for 12 doses, Disp: 2 mL, Rfl: 2    blood-glucose meter Misc, 1 Device by Misc.(Non-Drug; Combo Route) route 3 (three) times daily before meals., Disp: 1 each, Rfl: 0    diclofenac (VOLTAREN) 75 MG EC tablet, Take 1 tablet (75 mg total) by mouth 2 (two) times daily as needed (pain). Do not take with ibuprofen, motrin, aleve, naprosyn, or any other NSAID. (Patient not taking: Reported on 7/18/2025), Disp: 20 tablet, Rfl: 0    ergocalciferol, vitamin D2, (VITAMIN D ORAL), Take 2,000 Units by mouth. (Patient not taking: Reported on 7/18/2025), Disp: , Rfl:     HYDROcodone-acetaminophen (NORCO)  mg per tablet, Take 1 tablet by mouth every 8 (eight) hours as needed for Pain. (Patient not taking: Reported on 7/18/2025), Disp: 15 tablet, Rfl: 0    methocarbamoL  "(ROBAXIN) 500 MG Tab, Take 2 tablets (1,000 mg total) by mouth 3 (three) times daily as needed (muscle spasms or pain). May cause drowsiness. (Patient not taking: Reported on 7/18/2025), Disp: 30 tablet, Rfl: 0    ondansetron (ZOFRAN-ODT) 4 MG TbDL, Take 1 tablet (4 mg total) by mouth every 6 (six) hours as needed (n/v). (Patient not taking: Reported on 7/18/2025), Disp: 16 tablet, Rfl: 2    oxyCODONE (ROXICODONE) 10 mg Tab immediate release tablet, Take 1 tablet (10 mg total) by mouth every 4 (four) hours as needed for Pain. (Patient not taking: Reported on 7/18/2025), Disp: 15 tablet, Rfl: 0    pantoprazole (PROTONIX) 40 MG tablet, Take 1 tablet (40 mg total) by mouth every evening. (Patient not taking: Reported on 7/18/2025), Disp: 30 tablet, Rfl: 11    pen needle, diabetic 33 gauge x 5/32" Ndle, 365 Units by Misc.(Non-Drug; Combo Route) route once daily., Disp: 365 each, Rfl: 0    TECHLITE PEN NEEDLE 32 gauge x 5/32" Ndle, , Disp: , Rfl:     testosterone cypionate (DEPOTESTOTERONE CYPIONATE) 100 mg/mL injection, Inject 1 mL (100 mg total) into the muscle every 14 (fourteen) days. for 10 doses (Patient not taking: Reported on 10/10/2023), Disp: 10 mL, Rfl: 0    Current Facility-Administered Medications:     sodium chloride 0.9% flush 10 mL, 10 mL, Intravenous, PRN, Lev Manuel MD     ROS:                                                                                                                                                                             Review of Systems   Constitutional: Negative.    HENT: Negative.     Eyes: Negative.    Respiratory: Negative.  Negative for shortness of breath.    Cardiovascular: Negative.  Negative for chest pain, palpitations, orthopnea, claudication, leg swelling and PND.   Gastrointestinal: Negative.    Genitourinary: Negative.    Musculoskeletal: Negative.    Skin: Negative.    Neurological: Negative.    Endo/Heme/Allergies: Negative.    Psychiatric/Behavioral: " "Negative.          Blood pressure 99/80, pulse 72, temperature 97.4 °F (36.3 °C), temperature source Oral, resp. rate 18, height 5' 8" (1.727 m), weight 107 kg (236 lb), SpO2 99%.   PE:  Physical Exam  Constitutional:       Appearance: Normal appearance.   HENT:      Head: Normocephalic.   Eyes:      Pupils: Pupils are equal, round, and reactive to light.   Cardiovascular:      Rate and Rhythm: Normal rate and regular rhythm.      Pulses: Normal pulses.   Pulmonary:      Effort: Pulmonary effort is normal.   Musculoskeletal:         General: Normal range of motion.   Skin:     General: Skin is warm and dry.   Neurological:      General: No focal deficit present.      Mental Status: He is alert and oriented to person, place, and time.   Psychiatric:         Mood and Affect: Mood normal.         Behavior: Behavior normal.                ASSESSMENT/PLAN:  NICM (recovered EF) NYHA Class I  - LVEF- 60% per ECHO 1.13.23 recovered -->LVEF- 25% per ECHO June 2022  - [facility] - moderate CAD (60% 1st Mrg lesion ) (8.1.22)  - Denies SOB, RAYO   - Euvolemic upon exam, warm and dry  - Continue GDMT: Entresto 24/26 mg BID, Metoprolol succinate 25 mg qd.    - Counseled patient on low-salt diet and s/s of volume overload     Nonobstructive  CAD  - [facility] - moderate CAD (60% 1st Mrg lesion ) (8.1.22)  - Denies any anginal or anginal equivalent symptoms  - Continue ASA, Atorvastatin 40 mg, metoprolol succinate 25 mg   - Instructed patient to continue with lifestyle modifications, including heart healthy, low-cholesterol diet and exercise as tolerated     HLD  - LDL -128- above goal <70/DM  - Continue Atorvastatin 40 mg.  Admits to intermittent compliance with statin.  The patient will resume atorvastatin as prescribed.  Will repeat lipid panel prior to next clinic visit to reassess  - Counseled on heart healthy, low-cholesterol diet activity as tolerated    DM II  - A1C- 5.6  - Continue management per PCP           Follow-up in Cardiology " Clinic in 6 months with [facility] or sooner if needed  Follow up with PCP as directed

## 2025-07-18 NOTE — PATIENT INSTRUCTIONS
Follow-up in Cardiology Clinic in 6 months with [facility]P or sooner if needed  Follow up with PCP as directed

## (undated) DEVICE — GLOVE PROTEXIS PF LATEX 7.0

## (undated) DEVICE — NDL PNEUMO INSUFFLATI 120MM

## (undated) DEVICE — SUT VICRYL PLUS 3-0 SH 18IN

## (undated) DEVICE — SYR IRRIGATION BULB STER 60ML

## (undated) DEVICE — ELECTRODE PATIENT RETURN DISP

## (undated) DEVICE — GLOVE SIGNATURE ESSNTL LTX 7.5

## (undated) DEVICE — COVER CIV-FLEX PROBE US 58IN

## (undated) DEVICE — KIT LAPAROSCOPY UNIVERSITY

## (undated) DEVICE — HEMOSTAT VASC BAND REG 24CM

## (undated) DEVICE — POSITIONER HEEL FOAM CONVOLTD

## (undated) DEVICE — NDL BLUNT FILL 18G 1IN

## (undated) DEVICE — TROCAR ENDOPATH ECEL

## (undated) DEVICE — SPONGE LAP 18X18 PREWASHED

## (undated) DEVICE — DRAIN JACKSON PRATT TRCR 19FR

## (undated) DEVICE — GLOVE SENSICARE PI GRN 8

## (undated) DEVICE — PAD CURAD NONADH 3X4IN

## (undated) DEVICE — CATH OPTITORQUE RADIAL 5FR

## (undated) DEVICE — KIT SURGICAL TURNOVER

## (undated) DEVICE — Device

## (undated) DEVICE — INTRODUCER TRNSRADIAL 6F 10CM

## (undated) DEVICE — ELECTRODE EXTENDED BLADE

## (undated) DEVICE — NDL PNEUMOPERITONEUM 150MM

## (undated) DEVICE — GLOVE SENSICARE PI GRN 7.5

## (undated) DEVICE — R TROCAR ENDOPTH EXCL DILATING

## (undated) DEVICE — DRSNG POLYSKIN TRNSPAR 4X4.75

## (undated) DEVICE — RESERVOIR JACKSON-PRATT 100CC

## (undated) DEVICE — TROCAR ENDOPATH XCEL 12X100MM

## (undated) DEVICE — CANNULA ENDOPATH XCEL 5X100MM

## (undated) DEVICE — SUT MCRYL PLUS 4-0 PS2 27IN

## (undated) DEVICE — SUT 1 36IN PDS II

## (undated) DEVICE — APPLICATOR CHLORAPREP ORN 26ML

## (undated) DEVICE — SOL NACL IRR 1000ML BTL

## (undated) DEVICE — SUT SA85H SILK 2-0

## (undated) DEVICE — MANIFOLD 4 PORT

## (undated) DEVICE — STAPLER SKIN COUNT 35 W STPL

## (undated) DEVICE — SUT BLK MONO 3-0 CUT 18IN F

## (undated) DEVICE — ADHESIVE DERMABOND ADVANCED

## (undated) DEVICE — SOL IRRI STRL WATER 1000ML

## (undated) DEVICE — CONTRAST ISOVUE 370 500ML MULT

## (undated) DEVICE — GUIDEWIRE SAFE T .035IN 180CM

## (undated) DEVICE — GLOVE SIGNATURE MICRO LTX 7

## (undated) DEVICE — SUT 2/0 30IN PROLENE MONO

## (undated) DEVICE — SCISSOR 5MMX35CM DIRECT DRIVE

## (undated) DEVICE — NDL HYPO REG 25G X 1 1/2

## (undated) DEVICE — SUT VICRYL+ 27 UR-6 VIOL